# Patient Record
Sex: FEMALE | Race: OTHER | HISPANIC OR LATINO | URBAN - METROPOLITAN AREA
[De-identification: names, ages, dates, MRNs, and addresses within clinical notes are randomized per-mention and may not be internally consistent; named-entity substitution may affect disease eponyms.]

---

## 2017-01-02 ENCOUNTER — EMERGENCY (EMERGENCY)
Facility: HOSPITAL | Age: 52
LOS: 1 days | Discharge: HOME | End: 2017-01-02
Attending: EMERGENCY MEDICINE
Payer: MEDICAID

## 2017-01-02 VITALS
HEART RATE: 63 BPM | OXYGEN SATURATION: 96 % | SYSTOLIC BLOOD PRESSURE: 109 MMHG | TEMPERATURE: 98 F | DIASTOLIC BLOOD PRESSURE: 60 MMHG | RESPIRATION RATE: 20 BRPM

## 2017-01-02 VITALS
HEART RATE: 74 BPM | SYSTOLIC BLOOD PRESSURE: 110 MMHG | TEMPERATURE: 98 F | DIASTOLIC BLOOD PRESSURE: 53 MMHG | OXYGEN SATURATION: 97 % | RESPIRATION RATE: 96 BRPM | HEIGHT: 60 IN | WEIGHT: 130.07 LBS

## 2017-01-02 DIAGNOSIS — R19.7 DIARRHEA, UNSPECIFIED: ICD-10-CM

## 2017-01-02 DIAGNOSIS — R11.10 VOMITING, UNSPECIFIED: ICD-10-CM

## 2017-01-02 DIAGNOSIS — R50.9 FEVER, UNSPECIFIED: ICD-10-CM

## 2017-01-02 LAB
ALBUMIN SERPL ELPH-MCNC: 3.6 G/DL — SIGNIFICANT CHANGE UP (ref 3.5–5)
ALP SERPL-CCNC: 88 U/L — SIGNIFICANT CHANGE UP (ref 40–120)
ALT FLD-CCNC: 26 U/L DA — SIGNIFICANT CHANGE UP (ref 10–60)
ANION GAP SERPL CALC-SCNC: 8 MMOL/L — SIGNIFICANT CHANGE UP (ref 5–17)
APPEARANCE UR: CLEAR — SIGNIFICANT CHANGE UP
AST SERPL-CCNC: 24 U/L — SIGNIFICANT CHANGE UP (ref 10–40)
BASOPHILS # BLD AUTO: 0 K/UL — SIGNIFICANT CHANGE UP (ref 0–0.2)
BASOPHILS NFR BLD AUTO: 0.5 % — SIGNIFICANT CHANGE UP (ref 0–2)
BILIRUB SERPL-MCNC: 0.5 MG/DL — SIGNIFICANT CHANGE UP (ref 0.2–1.2)
BILIRUB UR-MCNC: NEGATIVE — SIGNIFICANT CHANGE UP
BUN SERPL-MCNC: 28 MG/DL — HIGH (ref 7–18)
CALCIUM SERPL-MCNC: 9.1 MG/DL — SIGNIFICANT CHANGE UP (ref 8.4–10.5)
CHLORIDE SERPL-SCNC: 109 MMOL/L — HIGH (ref 96–108)
CO2 SERPL-SCNC: 25 MMOL/L — SIGNIFICANT CHANGE UP (ref 22–31)
COLOR SPEC: YELLOW — SIGNIFICANT CHANGE UP
CREAT SERPL-MCNC: 1.22 MG/DL — SIGNIFICANT CHANGE UP (ref 0.5–1.3)
DIFF PNL FLD: ABNORMAL
EOSINOPHIL # BLD AUTO: 0.2 K/UL — SIGNIFICANT CHANGE UP (ref 0–0.5)
EOSINOPHIL NFR BLD AUTO: 2.1 % — SIGNIFICANT CHANGE UP (ref 0–6)
GLUCOSE SERPL-MCNC: 106 MG/DL — HIGH (ref 70–99)
GLUCOSE UR QL: NEGATIVE — SIGNIFICANT CHANGE UP
HCG UR QL: NEGATIVE — SIGNIFICANT CHANGE UP
HCT VFR BLD CALC: 36 % — SIGNIFICANT CHANGE UP (ref 34.5–45)
HGB BLD-MCNC: 12 G/DL — SIGNIFICANT CHANGE UP (ref 11.5–15.5)
KETONES UR-MCNC: ABNORMAL
LEUKOCYTE ESTERASE UR-ACNC: ABNORMAL
LIDOCAIN IGE QN: 169 U/L — SIGNIFICANT CHANGE UP (ref 73–393)
LYMPHOCYTES # BLD AUTO: 0.3 K/UL — LOW (ref 1–3.3)
LYMPHOCYTES # BLD AUTO: 3.2 % — LOW (ref 13–44)
MCHC RBC-ENTMCNC: 30.6 PG — SIGNIFICANT CHANGE UP (ref 27–34)
MCHC RBC-ENTMCNC: 33.4 GM/DL — SIGNIFICANT CHANGE UP (ref 32–36)
MCV RBC AUTO: 91.6 FL — SIGNIFICANT CHANGE UP (ref 80–100)
MONOCYTES # BLD AUTO: 0.2 K/UL — SIGNIFICANT CHANGE UP (ref 0–0.9)
MONOCYTES NFR BLD AUTO: 2.4 % — SIGNIFICANT CHANGE UP (ref 2–14)
NEUTROPHILS # BLD AUTO: 8.5 K/UL — HIGH (ref 1.8–7.4)
NEUTROPHILS NFR BLD AUTO: 91.8 % — HIGH (ref 43–77)
NITRITE UR-MCNC: NEGATIVE — SIGNIFICANT CHANGE UP
PH UR: 5 — SIGNIFICANT CHANGE UP (ref 4.8–8)
PLATELET # BLD AUTO: 204 K/UL — SIGNIFICANT CHANGE UP (ref 150–400)
POTASSIUM SERPL-MCNC: 4.2 MMOL/L — SIGNIFICANT CHANGE UP (ref 3.5–5.3)
POTASSIUM SERPL-SCNC: 4.2 MMOL/L — SIGNIFICANT CHANGE UP (ref 3.5–5.3)
PROT SERPL-MCNC: 8.2 G/DL — SIGNIFICANT CHANGE UP (ref 6–8.3)
PROT UR-MCNC: 500 MG/DL
RBC # BLD: 3.93 M/UL — SIGNIFICANT CHANGE UP (ref 3.8–5.2)
RBC # FLD: 12.6 % — SIGNIFICANT CHANGE UP (ref 10.3–14.5)
SODIUM SERPL-SCNC: 142 MMOL/L — SIGNIFICANT CHANGE UP (ref 135–145)
SP GR SPEC: 1.02 — SIGNIFICANT CHANGE UP (ref 1.01–1.02)
UROBILINOGEN FLD QL: NEGATIVE — SIGNIFICANT CHANGE UP
WBC # BLD: 9.3 K/UL — SIGNIFICANT CHANGE UP (ref 3.8–10.5)
WBC # FLD AUTO: 9.3 K/UL — SIGNIFICANT CHANGE UP (ref 3.8–10.5)

## 2017-01-02 PROCEDURE — 81025 URINE PREGNANCY TEST: CPT

## 2017-01-02 PROCEDURE — 99284 EMERGENCY DEPT VISIT MOD MDM: CPT

## 2017-01-02 PROCEDURE — 83690 ASSAY OF LIPASE: CPT

## 2017-01-02 PROCEDURE — 99284 EMERGENCY DEPT VISIT MOD MDM: CPT | Mod: 25

## 2017-01-02 PROCEDURE — 96374 THER/PROPH/DIAG INJ IV PUSH: CPT

## 2017-01-02 PROCEDURE — 80053 COMPREHEN METABOLIC PANEL: CPT

## 2017-01-02 PROCEDURE — 85027 COMPLETE CBC AUTOMATED: CPT

## 2017-01-02 PROCEDURE — 81001 URINALYSIS AUTO W/SCOPE: CPT

## 2017-01-02 RX ORDER — ONDANSETRON 8 MG/1
4 TABLET, FILM COATED ORAL ONCE
Qty: 0 | Refills: 0 | Status: COMPLETED | OUTPATIENT
Start: 2017-01-02 | End: 2017-01-02

## 2017-01-02 RX ORDER — ONDANSETRON 8 MG/1
1 TABLET, FILM COATED ORAL
Qty: 15 | Refills: 0
Start: 2017-01-02 | End: 2017-01-07

## 2017-01-02 RX ORDER — SODIUM CHLORIDE 9 MG/ML
1000 INJECTION INTRAMUSCULAR; INTRAVENOUS; SUBCUTANEOUS ONCE
Qty: 0 | Refills: 0 | Status: COMPLETED | OUTPATIENT
Start: 2017-01-02 | End: 2017-01-02

## 2017-01-02 RX ADMIN — SODIUM CHLORIDE 2000 MILLILITER(S): 9 INJECTION INTRAMUSCULAR; INTRAVENOUS; SUBCUTANEOUS at 14:27

## 2017-01-02 RX ADMIN — ONDANSETRON 4 MILLIGRAM(S): 8 TABLET, FILM COATED ORAL at 14:27

## 2017-01-02 NOTE — ED PROVIDER NOTE - OBJECTIVE STATEMENT
50 y/o F pt w/ PMHx of presents to the ED c/o vomiting x4 and diarrhea x3 onset today AM. Pt reports subjective fever. As per family, the Sx are worse w/ PO intake. Pt denies bloody emesis, blood in stool, dysuria, hematuria or any other complaints. NKDA.

## 2017-01-02 NOTE — ED PROVIDER NOTE - MEDICAL DECISION MAKING DETAILS
50 y/o F pt w/ vomiting and diarrhea. Likely viral gastroenteritis. Non tender abd. Treat w/ Zofran and IV fluids.

## 2017-01-02 NOTE — ED ADULT NURSE NOTE - OBJECTIVE STATEMENT
Patient came to the ED a/o x 3 ambulates c/o vomiting and slight abdominal pain since this AM. Patient's abdomen is non distended and soft to touch during physical assessment.

## 2017-01-02 NOTE — ED PROVIDER NOTE - NS ED MD SCRIBE ATTENDING SCRIBE SECTIONS
HIV/VITAL SIGNS( Pullset)/REVIEW OF SYSTEMS/PAST MEDICAL/SURGICAL/SOCIAL HISTORY/HISTORY OF PRESENT ILLNESS/DISPOSITION/PHYSICAL EXAM

## 2017-01-02 NOTE — ED PROVIDER NOTE - CONDUCTED A DETAILED DISCUSSION WITH PATIENT AND/OR GUARDIAN REGARDING, MDM
need for outpatient follow-up/lab results/return to ED if symptoms worsen, persist or questions arise

## 2019-04-11 ENCOUNTER — APPOINTMENT (OUTPATIENT)
Dept: NEPHROLOGY | Facility: CLINIC | Age: 54
End: 2019-04-11
Payer: COMMERCIAL

## 2019-04-11 ENCOUNTER — LABORATORY RESULT (OUTPATIENT)
Age: 54
End: 2019-04-11

## 2019-04-11 VITALS
DIASTOLIC BLOOD PRESSURE: 75 MMHG | HEIGHT: 58 IN | WEIGHT: 132.28 LBS | OXYGEN SATURATION: 97 % | BODY MASS INDEX: 27.77 KG/M2 | SYSTOLIC BLOOD PRESSURE: 134 MMHG | HEART RATE: 51 BPM

## 2019-04-11 DIAGNOSIS — Z78.9 OTHER SPECIFIED HEALTH STATUS: ICD-10-CM

## 2019-04-11 DIAGNOSIS — Z86.39 PERSONAL HISTORY OF OTHER ENDOCRINE, NUTRITIONAL AND METABOLIC DISEASE: ICD-10-CM

## 2019-04-11 PROCEDURE — 99204 OFFICE O/P NEW MOD 45 MIN: CPT

## 2019-04-11 NOTE — ASSESSMENT
[FreeTextEntry1] : Ms. 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 SHe has some rise in crt and worsening proteinuria and HTN. She has no IF done on this kidney bx last month. 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 Explained to pt and daughter and  that we need to repeat the kidney bx. They shall think about it and let me know by next 1-2 days.\par \par Meanwhile, cont conservative management\par Will check serologies for c3,c4, REBECCA, dsDNA, ANCAs, Anti GBM, RF, free light chains, PLA2R and urine p/crt ratio.\par Check coags incase needs a kidney biopsy again\par \par f.u after kidney bx\par \par

## 2019-04-11 NOTE — PHYSICAL EXAM
[General Appearance - Alert] : alert [General Appearance - In No Acute Distress] : in no acute distress [Sclera] : the sclera and conjunctiva were normal [PERRL With Normal Accommodation] : pupils were equal in size, round, and reactive to light [Extraocular Movements] : extraocular movements were intact [Outer Ear] : the ears and nose were normal in appearance [Oropharynx] : the oropharynx was normal [Auscultation Breath Sounds / Voice Sounds] : lungs were clear to auscultation bilaterally [Heart Rate And Rhythm] : heart rate was normal and rhythm regular [Heart Sounds] : normal S1 and S2 [Heart Sounds Gallop] : no gallops [Murmurs] : no murmurs [Heart Sounds Pericardial Friction Rub] : no pericardial rub [Edema] : there was no peripheral edema [Bowel Sounds] : normal bowel sounds [Abdomen Soft] : soft [Abdomen Tenderness] : non-tender [] : no hepato-splenomegaly [Abdomen Mass (___ Cm)] : no abdominal mass palpated [Skin Color & Pigmentation] : normal skin color and pigmentation [Cranial Nerves] : cranial nerves 2-12 were intact [Oriented To Time, Place, And Person] : oriented to person, place, and time

## 2019-04-11 NOTE — HISTORY OF PRESENT ILLNESS
[FreeTextEntry1] : Ms. 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 She was in usual state of health with just hx of hyperlipidemia saw Dr Cruz Godoy a year ago for mild non nephrotic range proteinuria and normal crt. She was started on a ACEI and monitored. She had serological workup and sonogram that were not reaveling. She then progressed and her proteinuria went from 700 to 1500mg/ 24 hours. She then had a kidney bx done last month that was non conclusive.  It was limited due to number of glomeruli but had ? immune complex deposition. EF showed 50% foot proces effacement and no tissue for IF. She is here for a second opinion. \par No n/v. No decrease in appetite, no termors. No edema worsening. no decreased sleep. No foamy urine. no hematuria, no dysuria. no confusion. No SOB, WHITE. No wt loss.\par

## 2019-04-11 NOTE — REASON FOR VISIT
[Initial Evaluation] : an initial evaluation [Spouse] : spouse [Family Member] : family member [FreeTextEntry1] : for proteinuria

## 2019-04-12 LAB
ALBUMIN SERPL ELPH-MCNC: 4.4 G/DL
ANION GAP SERPL CALC-SCNC: 13 MMOL/L
APPEARANCE: CLEAR
APTT BLD: 35.5 SEC
BACTERIA: NEGATIVE
BASOPHILS # BLD AUTO: 0.04 K/UL
BASOPHILS NFR BLD AUTO: 0.8 %
BILIRUBIN URINE: NEGATIVE
BLOOD URINE: NORMAL
BUN SERPL-MCNC: 35 MG/DL
C3 SERPL-MCNC: 105 MG/DL
C4 SERPL-MCNC: 26 MG/DL
CALCIUM SERPL-MCNC: 9.4 MG/DL
CHLORIDE SERPL-SCNC: 104 MMOL/L
CHOLEST SERPL-MCNC: 145 MG/DL
CHOLEST/HDLC SERPL: 1.8 RATIO
CO2 SERPL-SCNC: 24 MMOL/L
COLOR: COLORLESS
CREAT SERPL-MCNC: 1.15 MG/DL
CREAT SPEC-SCNC: 18 MG/DL
CREAT/PROT UR: 0.9 RATIO
DEPRECATED KAPPA LC FREE/LAMBDA SER: 1.28 RATIO
ENA RNP AB SER IA-ACNC: <0.2 AL
ENA SM AB SER IA-ACNC: <0.2 AL
EOSINOPHIL # BLD AUTO: 0.37 K/UL
EOSINOPHIL NFR BLD AUTO: 7.1 %
ESTIMATED AVERAGE GLUCOSE: 114 MG/DL
GLUCOSE QUALITATIVE U: NEGATIVE
GLUCOSE SERPL-MCNC: 101 MG/DL
HBA1C MFR BLD HPLC: 5.6 %
HBV CORE IGG+IGM SER QL: NONREACTIVE
HBV CORE IGM SER QL: NONREACTIVE
HBV SURFACE AB SER QL: NONREACTIVE
HBV SURFACE AB SERPL IA-ACNC: <3 MIU/ML
HBV SURFACE AG SER QL: NONREACTIVE
HCT VFR BLD CALC: 35.8 %
HCV AB SER QL: NONREACTIVE
HCV S/CO RATIO: 0.16 S/CO
HDLC SERPL-MCNC: 79 MG/DL
HGB BLD-MCNC: 11.6 G/DL
HIV1+2 AB SPEC QL IA.RAPID: NONREACTIVE
HYALINE CASTS: 0 /LPF
IMM GRANULOCYTES NFR BLD AUTO: 0.2 %
INR PPP: 1.03 RATIO
KAPPA LC CSF-MCNC: 3.34 MG/DL
KAPPA LC SERPL-MCNC: 4.26 MG/DL
KETONES URINE: NEGATIVE
LDLC SERPL CALC-MCNC: 49 MG/DL
LEUKOCYTE ESTERASE URINE: NEGATIVE
LYMPHOCYTES # BLD AUTO: 1.72 K/UL
LYMPHOCYTES NFR BLD AUTO: 33.1 %
MAN DIFF?: NORMAL
MCHC RBC-ENTMCNC: 31.1 PG
MCHC RBC-ENTMCNC: 32.4 GM/DL
MCV RBC AUTO: 96 FL
MICROSCOPIC-UA: NORMAL
MONOCYTES # BLD AUTO: 0.44 K/UL
MONOCYTES NFR BLD AUTO: 8.5 %
MPO AB + PR3 PNL SER: NORMAL
NEUTROPHILS # BLD AUTO: 2.61 K/UL
NEUTROPHILS NFR BLD AUTO: 50.3 %
NITRITE URINE: NEGATIVE
PH URINE: 6
PHOSPHATE SERPL-MCNC: 4 MG/DL
PLATELET # BLD AUTO: 216 K/UL
POTASSIUM SERPL-SCNC: 4.9 MMOL/L
PROT UR-MCNC: 17 MG/DL
PROTEIN URINE: NORMAL
PT BLD: 11.7 SEC
RBC # BLD: 3.73 M/UL
RBC # FLD: 12.5 %
RED BLOOD CELLS URINE: 1 /HPF
RHEUMATOID FACT SER QL: <10 IU/ML
SODIUM SERPL-SCNC: 141 MMOL/L
SPECIFIC GRAVITY URINE: 1.01
SQUAMOUS EPITHELIAL CELLS: 1 /HPF
T PALLIDUM AB SER QL IA: NEGATIVE
TRIGL SERPL-MCNC: 87 MG/DL
UROBILINOGEN URINE: NORMAL
WBC # FLD AUTO: 5.19 K/UL
WHITE BLOOD CELLS URINE: 0 /HPF

## 2019-04-15 LAB
DSDNA AB SER-ACNC: <12 IU/ML
M PROTEIN SPEC IFE-MCNC: NORMAL

## 2019-04-16 LAB
ANA PAT FLD IF-IMP: ABNORMAL
ANA SER IF-ACNC: ABNORMAL
PHOSPHOLIPASE A2 RECEPTOR ELISA: 3 RU/ML
PHOSPHOLIPASE A2 RECEPTOR IFA: NEGATIVE

## 2019-05-01 ENCOUNTER — OUTPATIENT (OUTPATIENT)
Dept: OUTPATIENT SERVICES | Facility: HOSPITAL | Age: 54
LOS: 1 days | End: 2019-05-01
Payer: MEDICAID

## 2019-05-07 ENCOUNTER — FORM ENCOUNTER (OUTPATIENT)
Age: 54
End: 2019-05-07

## 2019-05-08 ENCOUNTER — RESULT REVIEW (OUTPATIENT)
Age: 54
End: 2019-05-08

## 2019-05-08 ENCOUNTER — APPOINTMENT (OUTPATIENT)
Dept: ULTRASOUND IMAGING | Facility: HOSPITAL | Age: 54
End: 2019-05-08
Payer: COMMERCIAL

## 2019-05-08 ENCOUNTER — OUTPATIENT (OUTPATIENT)
Dept: OUTPATIENT SERVICES | Facility: HOSPITAL | Age: 54
LOS: 1 days | End: 2019-05-08
Payer: MEDICAID

## 2019-05-08 DIAGNOSIS — Z00.00 ENCOUNTER FOR GENERAL ADULT MEDICAL EXAMINATION WITHOUT ABNORMAL FINDINGS: ICD-10-CM

## 2019-05-08 PROCEDURE — 50200 RENAL BIOPSY PERQ: CPT | Mod: LT

## 2019-05-08 PROCEDURE — 88305 TISSUE EXAM BY PATHOLOGIST: CPT | Mod: 26

## 2019-05-08 PROCEDURE — 88350 IMFLUOR EA ADDL 1ANTB STN PX: CPT | Mod: 26

## 2019-05-08 PROCEDURE — 88313 SPECIAL STAINS GROUP 2: CPT

## 2019-05-08 PROCEDURE — 88346 IMFLUOR 1ST 1ANTB STAIN PX: CPT

## 2019-05-08 PROCEDURE — 88346 IMFLUOR 1ST 1ANTB STAIN PX: CPT | Mod: 26

## 2019-05-08 PROCEDURE — 88312 SPECIAL STAINS GROUP 1: CPT | Mod: 26

## 2019-05-08 PROCEDURE — 88350 IMFLUOR EA ADDL 1ANTB STN PX: CPT

## 2019-05-08 PROCEDURE — 88313 SPECIAL STAINS GROUP 2: CPT | Mod: 26

## 2019-05-08 PROCEDURE — 88348 ELECTRON MICROSCOPY DX: CPT | Mod: 26

## 2019-05-08 PROCEDURE — 88348 ELECTRON MICROSCOPY DX: CPT

## 2019-05-08 PROCEDURE — 88305 TISSUE EXAM BY PATHOLOGIST: CPT

## 2019-05-08 PROCEDURE — 76942 ECHO GUIDE FOR BIOPSY: CPT | Mod: 26

## 2019-05-08 PROCEDURE — 50200 RENAL BIOPSY PERQ: CPT

## 2019-05-08 PROCEDURE — 76942 ECHO GUIDE FOR BIOPSY: CPT

## 2019-05-08 PROCEDURE — 88312 SPECIAL STAINS GROUP 1: CPT

## 2019-05-09 ENCOUNTER — INPATIENT (INPATIENT)
Facility: HOSPITAL | Age: 54
LOS: 3 days | Discharge: HOME | DRG: 699 | End: 2019-05-13
Attending: HOSPITALIST | Admitting: HOSPITALIST
Payer: MEDICAID

## 2019-05-09 VITALS
DIASTOLIC BLOOD PRESSURE: 84 MMHG | HEIGHT: 61 IN | WEIGHT: 139.99 LBS | TEMPERATURE: 98 F | RESPIRATION RATE: 16 BRPM | OXYGEN SATURATION: 98 % | SYSTOLIC BLOOD PRESSURE: 134 MMHG | HEART RATE: 47 BPM

## 2019-05-09 DIAGNOSIS — S37.019A MINOR CONTUSION OF UNSPECIFIED KIDNEY, INITIAL ENCOUNTER: ICD-10-CM

## 2019-05-09 LAB
ALBUMIN SERPL ELPH-MCNC: 4 G/DL — SIGNIFICANT CHANGE UP (ref 3.3–5)
ALP SERPL-CCNC: 132 U/L — HIGH (ref 40–120)
ALT FLD-CCNC: 31 U/L — SIGNIFICANT CHANGE UP (ref 10–45)
ANION GAP SERPL CALC-SCNC: 17 MMOL/L — SIGNIFICANT CHANGE UP (ref 5–17)
APPEARANCE UR: CLEAR — SIGNIFICANT CHANGE UP
APTT BLD: 27.8 SEC — SIGNIFICANT CHANGE UP (ref 27.5–36.3)
AST SERPL-CCNC: 26 U/L — SIGNIFICANT CHANGE UP (ref 10–40)
BACTERIA # UR AUTO: NEGATIVE — SIGNIFICANT CHANGE UP
BASOPHILS # BLD AUTO: 0.1 K/UL — SIGNIFICANT CHANGE UP (ref 0–0.2)
BASOPHILS NFR BLD AUTO: 0.5 % — SIGNIFICANT CHANGE UP (ref 0–2)
BILIRUB SERPL-MCNC: 0.2 MG/DL — SIGNIFICANT CHANGE UP (ref 0.2–1.2)
BILIRUB UR-MCNC: NEGATIVE — SIGNIFICANT CHANGE UP
BLD GP AB SCN SERPL QL: NEGATIVE — SIGNIFICANT CHANGE UP
BUN SERPL-MCNC: 38 MG/DL — HIGH (ref 7–23)
CALCIUM SERPL-MCNC: 9.2 MG/DL — SIGNIFICANT CHANGE UP (ref 8.4–10.5)
CHLORIDE SERPL-SCNC: 101 MMOL/L — SIGNIFICANT CHANGE UP (ref 96–108)
CO2 SERPL-SCNC: 23 MMOL/L — SIGNIFICANT CHANGE UP (ref 22–31)
COLOR SPEC: SIGNIFICANT CHANGE UP
CREAT SERPL-MCNC: 1.37 MG/DL — HIGH (ref 0.5–1.3)
DIFF PNL FLD: ABNORMAL
EOSINOPHIL # BLD AUTO: 0.3 K/UL — SIGNIFICANT CHANGE UP (ref 0–0.5)
EOSINOPHIL NFR BLD AUTO: 1.6 % — SIGNIFICANT CHANGE UP (ref 0–6)
EPI CELLS # UR: 1 /HPF — SIGNIFICANT CHANGE UP
GAS PNL BLDV: SIGNIFICANT CHANGE UP
GAS PNL BLDV: SIGNIFICANT CHANGE UP
GLUCOSE SERPL-MCNC: 193 MG/DL — HIGH (ref 70–99)
GLUCOSE UR QL: NEGATIVE — SIGNIFICANT CHANGE UP
HCG SERPL-ACNC: 6.4 MIU/ML — SIGNIFICANT CHANGE UP
HCT VFR BLD CALC: 30.2 % — LOW (ref 34.5–45)
HCT VFR BLD CALC: 35.5 % — SIGNIFICANT CHANGE UP (ref 34.5–45)
HGB BLD-MCNC: 10.5 G/DL — LOW (ref 11.5–15.5)
HGB BLD-MCNC: 12.1 G/DL — SIGNIFICANT CHANGE UP (ref 11.5–15.5)
HYALINE CASTS # UR AUTO: 2 /LPF — SIGNIFICANT CHANGE UP (ref 0–2)
INR BLD: 1.11 RATIO — SIGNIFICANT CHANGE UP (ref 0.88–1.16)
KETONES UR-MCNC: NEGATIVE — SIGNIFICANT CHANGE UP
LEUKOCYTE ESTERASE UR-ACNC: NEGATIVE — SIGNIFICANT CHANGE UP
LIDOCAIN IGE QN: 45 U/L — SIGNIFICANT CHANGE UP (ref 7–60)
LYMPHOCYTES # BLD AUTO: 14.1 % — SIGNIFICANT CHANGE UP (ref 13–44)
LYMPHOCYTES # BLD AUTO: 2.2 K/UL — SIGNIFICANT CHANGE UP (ref 1–3.3)
MCHC RBC-ENTMCNC: 32.7 PG — SIGNIFICANT CHANGE UP (ref 27–34)
MCHC RBC-ENTMCNC: 33.3 PG — SIGNIFICANT CHANGE UP (ref 27–34)
MCHC RBC-ENTMCNC: 34 GM/DL — SIGNIFICANT CHANGE UP (ref 32–36)
MCHC RBC-ENTMCNC: 34.9 GM/DL — SIGNIFICANT CHANGE UP (ref 32–36)
MCV RBC AUTO: 95.4 FL — SIGNIFICANT CHANGE UP (ref 80–100)
MCV RBC AUTO: 96.2 FL — SIGNIFICANT CHANGE UP (ref 80–100)
MONOCYTES # BLD AUTO: 0.8 K/UL — SIGNIFICANT CHANGE UP (ref 0–0.9)
MONOCYTES NFR BLD AUTO: 5 % — SIGNIFICANT CHANGE UP (ref 2–14)
NEUTROPHILS # BLD AUTO: 12.6 K/UL — HIGH (ref 1.8–7.4)
NEUTROPHILS NFR BLD AUTO: 78.8 % — HIGH (ref 43–77)
NITRITE UR-MCNC: NEGATIVE — SIGNIFICANT CHANGE UP
PH UR: 6 — SIGNIFICANT CHANGE UP (ref 5–8)
PLATELET # BLD AUTO: 181 K/UL — SIGNIFICANT CHANGE UP (ref 150–400)
PLATELET # BLD AUTO: 229 K/UL — SIGNIFICANT CHANGE UP (ref 150–400)
POTASSIUM SERPL-MCNC: 4.4 MMOL/L — SIGNIFICANT CHANGE UP (ref 3.5–5.3)
POTASSIUM SERPL-SCNC: 4.4 MMOL/L — SIGNIFICANT CHANGE UP (ref 3.5–5.3)
PROT SERPL-MCNC: 7.7 G/DL — SIGNIFICANT CHANGE UP (ref 6–8.3)
PROT UR-MCNC: ABNORMAL
PROTHROM AB SERPL-ACNC: 12.7 SEC — SIGNIFICANT CHANGE UP (ref 10–12.9)
RBC # BLD: 3.17 M/UL — LOW (ref 3.8–5.2)
RBC # BLD: 3.69 M/UL — LOW (ref 3.8–5.2)
RBC # FLD: 11.2 % — SIGNIFICANT CHANGE UP (ref 10.3–14.5)
RBC # FLD: 11.2 % — SIGNIFICANT CHANGE UP (ref 10.3–14.5)
RBC CASTS # UR COMP ASSIST: 6 /HPF — HIGH (ref 0–4)
RH IG SCN BLD-IMP: POSITIVE — SIGNIFICANT CHANGE UP
SODIUM SERPL-SCNC: 141 MMOL/L — SIGNIFICANT CHANGE UP (ref 135–145)
SP GR SPEC: 1.02 — SIGNIFICANT CHANGE UP (ref 1.01–1.02)
UROBILINOGEN FLD QL: NEGATIVE — SIGNIFICANT CHANGE UP
WBC # BLD: 15.9 K/UL — HIGH (ref 3.8–10.5)
WBC # BLD: 17.2 K/UL — HIGH (ref 3.8–10.5)
WBC # FLD AUTO: 15.9 K/UL — HIGH (ref 3.8–10.5)
WBC # FLD AUTO: 17.2 K/UL — HIGH (ref 3.8–10.5)
WBC UR QL: 1 /HPF — SIGNIFICANT CHANGE UP (ref 0–5)

## 2019-05-09 PROCEDURE — 99231 SBSQ HOSP IP/OBS SF/LOW 25: CPT

## 2019-05-09 PROCEDURE — 99285 EMERGENCY DEPT VISIT HI MDM: CPT | Mod: 25

## 2019-05-09 PROCEDURE — 74177 CT ABD & PELVIS W/CONTRAST: CPT | Mod: 26

## 2019-05-09 PROCEDURE — 93010 ELECTROCARDIOGRAM REPORT: CPT

## 2019-05-09 RX ORDER — MORPHINE SULFATE 50 MG/1
4 CAPSULE, EXTENDED RELEASE ORAL ONCE
Refills: 0 | Status: DISCONTINUED | OUTPATIENT
Start: 2019-05-09 | End: 2019-05-09

## 2019-05-09 RX ORDER — ONDANSETRON 8 MG/1
4 TABLET, FILM COATED ORAL ONCE
Refills: 0 | Status: COMPLETED | OUTPATIENT
Start: 2019-05-09 | End: 2019-05-09

## 2019-05-09 RX ORDER — SODIUM CHLORIDE 9 MG/ML
1000 INJECTION, SOLUTION INTRAVENOUS ONCE
Refills: 0 | Status: COMPLETED | OUTPATIENT
Start: 2019-05-09 | End: 2019-05-09

## 2019-05-09 RX ADMIN — ONDANSETRON 4 MILLIGRAM(S): 8 TABLET, FILM COATED ORAL at 14:47

## 2019-05-09 RX ADMIN — MORPHINE SULFATE 4 MILLIGRAM(S): 50 CAPSULE, EXTENDED RELEASE ORAL at 20:16

## 2019-05-09 RX ADMIN — MORPHINE SULFATE 4 MILLIGRAM(S): 50 CAPSULE, EXTENDED RELEASE ORAL at 14:47

## 2019-05-09 RX ADMIN — MORPHINE SULFATE 4 MILLIGRAM(S): 50 CAPSULE, EXTENDED RELEASE ORAL at 16:00

## 2019-05-09 RX ADMIN — SODIUM CHLORIDE 1000 MILLILITER(S): 9 INJECTION, SOLUTION INTRAVENOUS at 18:30

## 2019-05-09 RX ADMIN — MORPHINE SULFATE 4 MILLIGRAM(S): 50 CAPSULE, EXTENDED RELEASE ORAL at 16:06

## 2019-05-09 NOTE — ED PROVIDER NOTE - CARE PLAN
Principal Discharge DX:	Perinephric hematoma Principal Discharge DX:	Perinephric hematoma  Secondary Diagnosis:	Bradycardia

## 2019-05-09 NOTE — CONSULT NOTE ADULT - SUBJECTIVE AND OBJECTIVE BOX
HPI    53F p/w L sided flank pain for 1 day. She is POD #1 from a left renal biopsy for evaluation of proteinuria and microscopic hematuria done by IR Dr. Carmona. Pt scores pain 8/10, associated w/ 2x episodes of vomiting, chills,  but no fever. Took Tylenol for pain but vomited shortly thereafter. Denies hematuria/dysuria, suprapubic pain.     PAST MEDICAL & SURGICAL HISTORY:  Proteinuria  Hypertension  No significant past surgical history      MEDICATIONS  (STANDING):    Allergies    No Known Allergies    SOCIAL HISTORY: denies smoking, alcohol    REVIEW OF SYSTEMS: Otherwise negative as stated in HPI    Physical Exam    Gen: awake, alert and oriented x 4, NAD. Skin normal, no rash.   Abd: soft, mild tender on palpation over LLQ  : bladder non palpable, no suprapubic pain  BACK: left site - dressing dry, clean, intact. No ecchymosis, no erythema, no edema. Mild tender on palpation over incision site    Vital signs  T(C): 37.1 (19 @ 20:10), Max: 37.1 (19 @ 20:10)  HR: 54 (19 @ 20:10)  BP: 154/84 (19 @ 20:10)  SpO2: 97% (19 @ 20:10)  Wt(kg): --      LABS:       @ 20:26    WBC 17.2  / Hct 30.2  / SCr --        @ 14:55    WBC 15.9  / Hct 35.5  / SCr 1.37         141  |  101  |  38<H>  ----------------------------<  193<H>  4.4   |  23  |  1.37<H>    Ca    9.2      09 May 2019 14:55    TPro  7.7  /  Alb  4.0  /  TBili  0.2  /  DBili  x   /  AST  26  /  ALT  31  /  AlkPhos  132<H>        Urinalysis Basic - ( 09 May 2019 17:32 )    Color: Light Yellow / Appearance: Clear / S.019 / pH: x  Gluc: x / Ketone: Negative  / Bili: Negative / Urobili: Negative   Blood: x / Protein: 100 mg/dL / Nitrite: Negative   Leuk Esterase: Negative / RBC: 6 /hpf / WBC 1 /HPF   Sq Epi: x / Non Sq Epi: 1 /hpf / Bacteria: Negative      RADIOLOGY:  < from: CT Abdomen and Pelvis w/ IV Cont (19 @ 18:05) >  PROCEDURE:   CT of the Abdomen and Pelvis was performed with intravenous contrast.   Intravenous contrast: 90 ml Omnipaque 350. 10 ml discarded.  Oral contrast: None.  Sagittal and coronal reformats were performed.    FINDINGS:    LOWER CHEST: Trace left pleural effusion.    LIVER: Within normal limits.  BILE DUCTS: Mild intrahepatic biliary ductal dilatation. The common bile   duct measures up to 1.7 cm. No radiodense choledocholithiasis.  GALLBLADDER: Cholecystectomy.  SPLEEN: Within normal limits.  PANCREAS: Within normal limits.  ADRENALS: Within normal limits.  KIDNEYS/URETERS: Small left renal lower pole defect likely represents the   renal biopsy site. There is a moderate left perirenal hematoma. Slightly   delayed nephrogram of the left kidney. No hydronephrosis. No active   extravasation of contrast. Left renal artery and vein are unremarkable.   Right kidney is within normal limits.    BLADDER: Within normal limits.  REPRODUCTIVE ORGANS: Uterus and adnexa within normal limits.    BOWEL: No bowel obstruction. Appendix is normal.  PERITONEUM: Trace perihepatic hematoma. Retroperitoneal blood tracks into   the upper abdomen and the pelvis.  VESSELS:  Within normal limits.  RETROPERITONEUM: Nolymphadenopathy.    ABDOMINAL WALL: Within normal limits.  BONES: Minimal degenerative changes.    IMPRESSION:     Moderate left perirenal hematoma with delayed nephrogram. No active   bleeding identified.    Marked CBD dilatation status post cholecystectomy; suggest nonemergent GI   consultation.    < end of copied text >      < from: US Biopsy Renal, Left (19 @ 14:41) >  INTERPRETATION:  CLINICAL INFORMATION: Kidney dysfunction. The patient is   here for kidney biopsy.    Following an explanation of risks, benefits, and alternatives the patient   gave informed consent. The patient was prepped and draped in the usual   sterile fashion and 2% buffered Lidocaine solution was used for local   anesthesia.     Under ultrasound guidance an automated 18-gauge core biopsy needle was   inserted 3 times into the left renal parenchyma. Adequate core samples   were obtained. There were no complications.    IMPRESSION:    Ultrasound guided left kidney biopsy.      < end of copied text > HPI:  53F p/w L sided flank pain for 1 day. She is POD #1 from a left renal biopsy for evaluation of proteinuria and microscopic hematuria done by IR Dr. Carmona. Pt scores pain 8/10, associated w/ 2x episodes of vomiting, chills,  but no fever. Took Tylenol for pain but vomited shortly thereafter. Denies hematuria/dysuria, suprapubic pain.     PAST MEDICAL & SURGICAL HISTORY:  Proteinuria  Hypertension  No significant past surgical history    No Known Allergies    SOCIAL HISTORY: denies smoking, alcohol    REVIEW OF SYSTEMS: Otherwise negative as stated in HPI    Physical Exam  Gen: awake, alert and oriented x 4, NAD. Skin normal, no rash.   Abd: soft, mild tender on palpation over LLQ  : bladder non palpable, no suprapubic pain  BACK: left site - dressing dry, clean, intact. No ecchymosis, no erythema, no edema. Mild tender on palpation over incision site    Vital signs  T(C): 37.1 (19 @ 20:10), Max: 37.1 (19 @ 20:10)  HR: 54 (19 @ 20:10)  BP: 154/84 (19 @ 20:10)  SpO2: 97% (19 @ 20:10)    LABS:   @ 20:26  WBC 17.2  / Hct 30.2  / SCr --        @ 14:55  WBC 15.9  / Hct 35.5  / SCr 1.37       141  |  101  |  38<H>  ----------------------------<  193<H>  4.4   |  23  |  1.37<H>    Ca    9.2      09 May 2019 14:55    TPro  7.7  /  Alb  4.0  /  TBili  0.2  /  DBili  x   /  AST  26  /  ALT  31  /  AlkPhos  132<H>      Urinalysis Basic - ( 09 May 2019 17:32 )  Color: Light Yellow / Appearance: Clear / S.019 / pH: x  Gluc: x / Ketone: Negative  / Bili: Negative / Urobili: Negative   Blood: x / Protein: 100 mg/dL / Nitrite: Negative   Leuk Esterase: Negative / RBC: 6 /hpf / WBC 1 /HPF   Sq Epi: x / Non Sq Epi: 1 /hpf / Bacteria: Negative      RADIOLOGY:  < from: CT Abdomen and Pelvis w/ IV Cont (19 @ 18:05) >  PROCEDURE:     LOWER CHEST: Trace left pleural effusion.  LIVER: Within normal limits.  BILE DUCTS: Mild intrahepatic biliary ductal dilatation. The common bile   duct measures up to 1.7 cm. No radiodense choledocholithiasis.  GALLBLADDER: Cholecystectomy.  SPLEEN: Within normal limits.  PANCREAS: Within normal limits.  ADRENALS: Within normal limits.  KIDNEYS/URETERS: Small left renal lower pole defect likely represents the   renal biopsy site. There is a moderate left perirenal hematoma. Slightly   delayed nephrogram of the left kidney. No hydronephrosis. No active   extravasation of contrast. Left renal artery and vein are unremarkable.   Right kidney is within normal limits.  BLADDER: Within normal limits.  REPRODUCTIVE ORGANS: Uterus and adnexa within normal limits.  BOWEL: No bowel obstruction. Appendix is normal.  PERITONEUM: Trace perihepatic hematoma. Retroperitoneal blood tracks into   the upper abdomen and the pelvis.  VESSELS:  Within normal limits.  RETROPERITONEUM: Nolymphadenopathy.    ABDOMINAL WALL: Within normal limits.  BONES: Minimal degenerative changes.    IMPRESSION:   Moderate left perirenal hematoma with delayed nephrogram. No active   bleeding identified.  Marked CBD dilatation status post cholecystectomy; suggest nonemergent GI   consultation.      IR Bx:  Following an explanation of risks, benefits, and alternatives the patient   gave informed consent. The patient was prepped and draped in the usual   sterile fashion and 2% buffered Lidocaine solution was used for local   anesthesia.     Under ultrasound guidance an automated 18-gauge core biopsy needle was   inserted 3 times into the left renal parenchyma. Adequate core samples   were obtained. There were no complications.    IMPRESSION:    Ultrasound guided left kidney biopsy.

## 2019-05-09 NOTE — ED PROVIDER NOTE - OBJECTIVE STATEMENT
53F p/w L sided flank pain for 1 day. She is POD #1 from a left renal biopsy for evaluation of proteinuria and microscopic hematuria. Around 0300 she developed the pain, associated w/ 2x episodes of vomiting, chills,  but no fever. Took tylenol for pain but vomited shortly thereafter. No hematuria/dysuria.    IR: Dr. Roberth Carmona  Urologist: Dr. Cruz Godoy  PCP: Dr. Correa

## 2019-05-09 NOTE — ED PROVIDER NOTE - PROGRESS NOTE DETAILS
Endorsed to Dr Mario Henderson MD, Facep Jonathan Weil, PGY2 - perinephric/RP bleed on CT. Will consult urology, will need admission. pt signed out to me pending imaging, was found to have perinephric hematoma. pt seen by urology PA, who recommends medicine admission for serial cbc, pain control, monitoring. - Feroz Bach MD

## 2019-05-09 NOTE — ED ADULT NURSE NOTE - OBJECTIVE STATEMENT
53YOF pmh HTN, HLD presents to ED c/o left flank pain, b/l abd pain, N/V since 11am this morning. Pt speaks Palestinian, refused  services. Daughter prefers to translate. Per daughter at bedside, pt was found to have protein in her urine. Got biopsy fist time but not enough specimen, so received second biopsy yesterday. Pt stated having episode of nausea and vomitting this morning. Denies CP, SOB. fever, chills, diarrhea, blood in urine or stool, burning upon urination. Safety and comfort measures provided. Will continue, Family at bedside.

## 2019-05-09 NOTE — ED PROVIDER NOTE - ATTENDING CONTRIBUTION TO CARE
53y female pmh ?proteinuria, HTN, sp ir renal bx left yesterday. Now comes to ed complains of left flank pain mod severe since procedure with several episodes of NBNB vomiting, without fever chills.cp.sob. PE WDWN female awake alert looking acutely uncomfortable. NCAT Neck supple chest clear anterior & posterior, Left cva mild ttp. +bs, no murphys,psoas,slr, CV no rmg, neruo no focal defects  Roberth Henderson MD, Facep

## 2019-05-09 NOTE — CONSULT NOTE ADULT - ASSESSMENT
53F p/w L sided flank pain for 1 day, POD #1 from a left renal biopsy for evaluation of proteinuria and microscopic hematuria with CT finding of left moderate perirenal hematoma with no active extravasation       - pain control  - antiemetic as needed  - monitor H&H  - no acute urological surgical intervention recommended at this time  - if patient will become unstable please consider IR consult for possible embolization procedure 53F p/w L sided flank pain for 1 day, POD #1 from a left renal biopsy for evaluation of proteinuria and microscopic hematuria with CT finding of left moderate perirenal hematoma with no active extravasation       - pain control  - antiemetic as needed  - monitor H&H  - no acute urological surgical intervention recommended at this time  - If patient becomes unstable please consider IR consult for possible selective angioembolization

## 2019-05-09 NOTE — ED ADULT NURSE REASSESSMENT NOTE - NS ED NURSE REASSESS COMMENT FT1
Patient sleeping in bed at this time. Currently 7 family members at bedside, family was notified that there is only to be 2 family members at the bed side at a time. Security notified at this time.

## 2019-05-09 NOTE — ED PROVIDER NOTE - CONSTITUTIONAL, MLM
normal... Appears uncomfortable d/t pain, otherwise non-toxic, well nourished, awake, alert, oriented to person, place, time/situation

## 2019-05-10 DIAGNOSIS — K83.8 OTHER SPECIFIED DISEASES OF BILIARY TRACT: ICD-10-CM

## 2019-05-10 DIAGNOSIS — T14.8XXA OTHER INJURY OF UNSPECIFIED BODY REGION, INITIAL ENCOUNTER: ICD-10-CM

## 2019-05-10 DIAGNOSIS — Z29.9 ENCOUNTER FOR PROPHYLACTIC MEASURES, UNSPECIFIED: ICD-10-CM

## 2019-05-10 DIAGNOSIS — I10 ESSENTIAL (PRIMARY) HYPERTENSION: ICD-10-CM

## 2019-05-10 DIAGNOSIS — Z90.49 ACQUIRED ABSENCE OF OTHER SPECIFIED PARTS OF DIGESTIVE TRACT: Chronic | ICD-10-CM

## 2019-05-10 DIAGNOSIS — N18.3 CHRONIC KIDNEY DISEASE, STAGE 3 (MODERATE): ICD-10-CM

## 2019-05-10 DIAGNOSIS — N28.89 OTHER SPECIFIED DISORDERS OF KIDNEY AND URETER: ICD-10-CM

## 2019-05-10 DIAGNOSIS — R00.1 BRADYCARDIA, UNSPECIFIED: ICD-10-CM

## 2019-05-10 DIAGNOSIS — D72.829 ELEVATED WHITE BLOOD CELL COUNT, UNSPECIFIED: ICD-10-CM

## 2019-05-10 DIAGNOSIS — E78.5 HYPERLIPIDEMIA, UNSPECIFIED: ICD-10-CM

## 2019-05-10 DIAGNOSIS — Z71.89 OTHER SPECIFIED COUNSELING: ICD-10-CM

## 2019-05-10 DIAGNOSIS — S37.019A MINOR CONTUSION OF UNSPECIFIED KIDNEY, INITIAL ENCOUNTER: ICD-10-CM

## 2019-05-10 DIAGNOSIS — N17.9 ACUTE KIDNEY FAILURE, UNSPECIFIED: ICD-10-CM

## 2019-05-10 DIAGNOSIS — D50.0 IRON DEFICIENCY ANEMIA SECONDARY TO BLOOD LOSS (CHRONIC): ICD-10-CM

## 2019-05-10 LAB
ALBUMIN SERPL ELPH-MCNC: 3.5 G/DL — SIGNIFICANT CHANGE UP (ref 3.3–5)
ALP SERPL-CCNC: 130 U/L — HIGH (ref 40–120)
ALT FLD-CCNC: 165 U/L — HIGH (ref 10–45)
ANION GAP SERPL CALC-SCNC: 10 MMOL/L — SIGNIFICANT CHANGE UP (ref 5–17)
ANION GAP SERPL CALC-SCNC: 12 MMOL/L — SIGNIFICANT CHANGE UP (ref 5–17)
AST SERPL-CCNC: 197 U/L — HIGH (ref 10–40)
BILIRUB SERPL-MCNC: 0.5 MG/DL — SIGNIFICANT CHANGE UP (ref 0.2–1.2)
BUN SERPL-MCNC: 32 MG/DL — HIGH (ref 7–23)
BUN SERPL-MCNC: 32 MG/DL — HIGH (ref 7–23)
CALCIUM SERPL-MCNC: 8.6 MG/DL — SIGNIFICANT CHANGE UP (ref 8.4–10.5)
CALCIUM SERPL-MCNC: 8.6 MG/DL — SIGNIFICANT CHANGE UP (ref 8.4–10.5)
CHLORIDE SERPL-SCNC: 100 MMOL/L — SIGNIFICANT CHANGE UP (ref 96–108)
CHLORIDE SERPL-SCNC: 102 MMOL/L — SIGNIFICANT CHANGE UP (ref 96–108)
CO2 SERPL-SCNC: 23 MMOL/L — SIGNIFICANT CHANGE UP (ref 22–31)
CO2 SERPL-SCNC: 25 MMOL/L — SIGNIFICANT CHANGE UP (ref 22–31)
CREAT SERPL-MCNC: 1.36 MG/DL — HIGH (ref 0.5–1.3)
CREAT SERPL-MCNC: 1.36 MG/DL — HIGH (ref 0.5–1.3)
CULTURE RESULTS: SIGNIFICANT CHANGE UP
GLUCOSE SERPL-MCNC: 101 MG/DL — HIGH (ref 70–99)
GLUCOSE SERPL-MCNC: 105 MG/DL — HIGH (ref 70–99)
HCT VFR BLD CALC: 28.5 % — LOW (ref 34.5–45)
HCT VFR BLD CALC: 29.7 % — LOW (ref 34.5–45)
HCT VFR BLD CALC: 29.9 % — LOW (ref 34.5–45)
HGB BLD-MCNC: 10.1 G/DL — LOW (ref 11.5–15.5)
HGB BLD-MCNC: 10.2 G/DL — LOW (ref 11.5–15.5)
HGB BLD-MCNC: 10.2 G/DL — LOW (ref 11.5–15.5)
MAGNESIUM SERPL-MCNC: 2.2 MG/DL — SIGNIFICANT CHANGE UP (ref 1.6–2.6)
MCHC RBC-ENTMCNC: 32.4 PG — SIGNIFICANT CHANGE UP (ref 27–34)
MCHC RBC-ENTMCNC: 32.9 PG — SIGNIFICANT CHANGE UP (ref 27–34)
MCHC RBC-ENTMCNC: 33.5 PG — SIGNIFICANT CHANGE UP (ref 27–34)
MCHC RBC-ENTMCNC: 34.3 GM/DL — SIGNIFICANT CHANGE UP (ref 32–36)
MCHC RBC-ENTMCNC: 34.5 GM/DL — SIGNIFICANT CHANGE UP (ref 32–36)
MCHC RBC-ENTMCNC: 35.4 GM/DL — SIGNIFICANT CHANGE UP (ref 32–36)
MCV RBC AUTO: 94.5 FL — SIGNIFICANT CHANGE UP (ref 80–100)
MCV RBC AUTO: 94.8 FL — SIGNIFICANT CHANGE UP (ref 80–100)
MCV RBC AUTO: 95.5 FL — SIGNIFICANT CHANGE UP (ref 80–100)
PHOSPHATE SERPL-MCNC: 5.3 MG/DL — HIGH (ref 2.5–4.5)
PLATELET # BLD AUTO: 182 K/UL — SIGNIFICANT CHANGE UP (ref 150–400)
PLATELET # BLD AUTO: 184 K/UL — SIGNIFICANT CHANGE UP (ref 150–400)
PLATELET # BLD AUTO: 197 K/UL — SIGNIFICANT CHANGE UP (ref 150–400)
POTASSIUM SERPL-MCNC: 4.3 MMOL/L — SIGNIFICANT CHANGE UP (ref 3.5–5.3)
POTASSIUM SERPL-MCNC: 4.5 MMOL/L — SIGNIFICANT CHANGE UP (ref 3.5–5.3)
POTASSIUM SERPL-SCNC: 4.3 MMOL/L — SIGNIFICANT CHANGE UP (ref 3.5–5.3)
POTASSIUM SERPL-SCNC: 4.5 MMOL/L — SIGNIFICANT CHANGE UP (ref 3.5–5.3)
PROT SERPL-MCNC: 6.7 G/DL — SIGNIFICANT CHANGE UP (ref 6–8.3)
RBC # BLD: 3 M/UL — LOW (ref 3.8–5.2)
RBC # BLD: 3.11 M/UL — LOW (ref 3.8–5.2)
RBC # BLD: 3.17 M/UL — LOW (ref 3.8–5.2)
RBC # FLD: 11.2 % — SIGNIFICANT CHANGE UP (ref 10.3–14.5)
SODIUM SERPL-SCNC: 135 MMOL/L — SIGNIFICANT CHANGE UP (ref 135–145)
SODIUM SERPL-SCNC: 137 MMOL/L — SIGNIFICANT CHANGE UP (ref 135–145)
SPECIMEN SOURCE: SIGNIFICANT CHANGE UP
TSH SERPL-MCNC: 0.69 UIU/ML — SIGNIFICANT CHANGE UP (ref 0.27–4.2)
WBC # BLD: 10.2 K/UL — SIGNIFICANT CHANGE UP (ref 3.8–10.5)
WBC # BLD: 11.7 K/UL — HIGH (ref 3.8–10.5)
WBC # BLD: 12.5 K/UL — HIGH (ref 3.8–10.5)
WBC # FLD AUTO: 10.2 K/UL — SIGNIFICANT CHANGE UP (ref 3.8–10.5)
WBC # FLD AUTO: 11.7 K/UL — HIGH (ref 3.8–10.5)
WBC # FLD AUTO: 12.5 K/UL — HIGH (ref 3.8–10.5)

## 2019-05-10 PROCEDURE — 12345: CPT | Mod: NC

## 2019-05-10 PROCEDURE — 99223 1ST HOSP IP/OBS HIGH 75: CPT | Mod: GC

## 2019-05-10 RX ORDER — ATORVASTATIN CALCIUM 80 MG/1
10 TABLET, FILM COATED ORAL AT BEDTIME
Refills: 0 | Status: DISCONTINUED | OUTPATIENT
Start: 2019-05-10 | End: 2019-05-13

## 2019-05-10 RX ORDER — LOSARTAN POTASSIUM 100 MG/1
50 TABLET, FILM COATED ORAL DAILY
Refills: 0 | Status: DISCONTINUED | OUTPATIENT
Start: 2019-05-10 | End: 2019-05-13

## 2019-05-10 RX ADMIN — MORPHINE SULFATE 4 MILLIGRAM(S): 50 CAPSULE, EXTENDED RELEASE ORAL at 00:12

## 2019-05-10 RX ADMIN — ATORVASTATIN CALCIUM 10 MILLIGRAM(S): 80 TABLET, FILM COATED ORAL at 21:14

## 2019-05-10 NOTE — CONSULT NOTE ADULT - PROBLEM SELECTOR RECOMMENDATION 2
with flank pain. S/p renal Bx of 5/8/19. Hgb dropped from 12 range to 10. Hgb now stable. CT scan not showing active bleeding.   - Monitor Hgb q12h  - If downtrends needs IR input for possible ablation  - Pain control

## 2019-05-10 NOTE — H&P ADULT - NSHPPHYSICALEXAM_GEN_ALL_CORE
Vital Signs Last 24 Hrs  T(C): 37.1 (10 May 2019 03:19), Max: 37.2 (09 May 2019 23:51)  T(F): 98.7 (10 May 2019 03:19), Max: 99 (09 May 2019 23:51)  HR: 66 (10 May 2019 03:19) (47 - 66)  BP: 124/72 (10 May 2019 03:19) (119/80 - 154/84)  BP(mean): 108 (09 May 2019 20:10) (108 - 108)  RR: 16 (10 May 2019 03:19) (16 - 18)  SpO2: 99% (10 May 2019 03:19) (96% - 99%)    General: NAD  Neurology: A&Ox3, nonfocal, CAIN x 4  Eyes: PERRLA/ EOMI, Gross vision intact  ENT/Neck: Neck supple, trachea midline, No JVD, Gross hearing intact  Respiratory: CTA B/L, No wheezing, rales, rhonchi  CV: RRR, S1S2, no murmurs, rubs or gallops  Abdominal: Soft, NT, ND +BS  : no CVA tenderness, left renal biopsy site with dressing c/d/i   Extremities: No edema, + peripheral pulses  Skin: No Rashes, Hematoma, Ecchymosis Vital Signs Last 24 Hrs  T(C): 37.1 (10 May 2019 03:19), Max: 37.2 (09 May 2019 23:51)  T(F): 98.7 (10 May 2019 03:19), Max: 99 (09 May 2019 23:51)  HR: 66 (10 May 2019 03:19) (47 - 66)  BP: 124/72 (10 May 2019 03:19) (119/80 - 154/84)  BP(mean): 108 (09 May 2019 20:10) (108 - 108)  RR: 16 (10 May 2019 03:19) (16 - 18)  SpO2: 99% (10 May 2019 03:19) (96% - 99%)    General: NAD  Neurology: A&Ox3, nonfocal, CAIN x 4  Eyes: PERRLA/ EOMI, Gross vision intact  ENT/Neck: Neck supple, trachea midline, No JVD, Gross hearing intact  Respiratory: CTA B/L, No wheezing, rales, rhonchi  CV: RRR, S1S2, no murmurs, rubs or gallops  Abdominal: Soft, NT, ND +BS  : no CVA tenderness, left renal biopsy site with dressing c/d/i   Extremities: No edema, + peripheral pulses  Skin: No Rashes, Hematoma, Ecchymosis  Psych: normal mood

## 2019-05-10 NOTE — H&P ADULT - ASSESSMENT
52 y/o Guyanese-speaking female with newly diagnosed immune complex GN, currently undergoing second opinion, s/p s/p left renal biopsy POD#1, HLD who presents with left sided flank pain a/w acute blood loss anemia 2/2 left perirenal hematoma. 54 y/o Guamanian-speaking female with newly diagnosed immune complex GN, currently undergoing second opinion, s/p left renal biopsy POD#1, HLD who presents with left sided flank pain a/w acute blood loss anemia 2/2 left perirenal hematoma.

## 2019-05-10 NOTE — PROGRESS NOTE ADULT - PROBLEM SELECTOR PLAN 5
Losartan 50 mg daily held on admission in setting of bleed and small bump in Cr.  - If Cr stabilizes over next 24 hours and BP remains adequate, would reinitiate ARB, especially in setting of proteinuria

## 2019-05-10 NOTE — CONSULT NOTE ADULT - ASSESSMENT
54yo Portuguese-speaking F w/ HLD, with hematuria and non-nephrotic proteinuria s/p renal biopsy on 5/8, who p/w left sided back pain, n/v. Found to have palak-renal hematoma on AP CT scan.

## 2019-05-10 NOTE — H&P ADULT - PROBLEM SELECTOR PLAN 6
- bradycardic to 47 on admission, now improved to 66  - EKG with sinus bradycardia, HR 50,   - pt. asymptomatic, on [employer] HR of 50 as outpatient  - monitor on telemetry, check TSH

## 2019-05-10 NOTE — H&P ADULT - PROBLEM SELECTOR PLAN 7
- CTAP with marked CBD dilatation, s/p cholecystectomy  - Alk-phos 132, bilirubin wnl  - pt. without RUQ pain, trend CMP, f/u GGT and US abdomen - CTAP with marked CBD dilatation, s/p cholecystectomy  - Alk-phos 132, bilirubin wnl  - f/u outpatient

## 2019-05-10 NOTE — H&P ADULT - NSHPREVIEWOFSYSTEMS_GEN_ALL_CORE
REVIEW OF SYSTEMS:    CONSTITUTIONAL: no weakness, fevers or chills  EYES: No visual changes  ENT: No vertigo or throat pain   NECK: No pain or stiffness  RESPIRATORY: No cough, wheezing, hemoptysis; No shortness of breath  CARDIOVASCULAR: No chest pain or palpitations  GASTROINTESTINAL: No abdominal or epigastric pain. +nausea, +vomiting, no hematemesis; No diarrhea or constipation. No melena or hematochezia.  GENITOURINARY: + left sided flank pain, No dysuria, frequency or hematuria  NEUROLOGICAL: No numbness or weakness  SKIN: No itching, rashes REVIEW OF SYSTEMS:    CONSTITUTIONAL: no weakness, fevers or chills  EYES: No visual changes  ENT: No vertigo or throat pain   NECK: No pain or stiffness  RESPIRATORY: No cough, wheezing, hemoptysis; No shortness of breath  CARDIOVASCULAR: No chest pain or palpitations  GASTROINTESTINAL: No abdominal or epigastric pain. +nausea, +vomiting, no hematemesis; No diarrhea or constipation. No melena or hematochezia.  GENITOURINARY: + left sided flank pain, No dysuria, frequency or hematuria  NEUROLOGICAL: No numbness or weakness  SKIN: No itching, rashes  PSYCH: No depression or anxiety

## 2019-05-10 NOTE — CONSULT NOTE ADULT - SUBJECTIVE AND OBJECTIVE BOX
[facility] DIVISION OF KIDNEY DISEASES AND HYPERTENSION -- INITIAL CONSULT NOTE  --------------------------------------------------------------------------------  HPI:  Pt is a 52yo Cameroonian-speaking F w/ HLD, with hematuria and non-nephrotic proteinuria s/p renal biopsy on 5/8, who p/w left sided back pain, n/v. Found to have palak-renal hematoma on AP CT scan.    Pt has Hx of hematuria and proteinuria. Had 0.7g protein which increased to 1.5g, so underwent an initial renal biopsy in 3/2019.  Was told there was immune complex deposition with 50% foot process effacement.   Came to Dr. Palma for second opinion.  SCr stable at ~1.1 for at least past 2 years.  GN/proteinuria w/up was sent which was unremarkable, just 0.9g proteinuria and mildly +REBECCA 1:80.  Therefore underwent repeat biopsy.   Repeat biopsy showing IgA with 40-50% interstitial fibrosis and tubular atrophy, with mod-severe vascular sclerosis. No crescents. No immuno complex dx noted.    PAST HISTORY  --------------------------------------------------------------------------------  PAST MEDICAL & SURGICAL HISTORY:  Hyperlipidemia  Proteinuria  S/P cholecystectomy    FAMILY HISTORY:    PAST SOCIAL HISTORY:    ALLERGIES & MEDICATIONS  --------------------------------------------------------------------------------  Allergies    No Known Allergies    Intolerances      Standing Inpatient Medications  atorvastatin 10 milliGRAM(s) Oral at bedtime    PRN Inpatient Medications      REVIEW OF SYSTEMS  --------------------------------------------------------------------------------  Gen: No weight changes, fatigue, fevers/chills, weakness  Skin: No rashes  Head/Eyes/Ears/Mouth: No headache; Normal hearing; Normal vision w/o blurriness; No sinus pain/discomfort, sore throat  Respiratory: No dyspnea, cough, wheezing, hemoptysis  CV: No chest pain, PND, orthopnea  GI: No abdominal pain, diarrhea, constipation, nausea, vomiting, melena, hematochezia  : No increased frequency, dysuria, hematuria, nocturia  MSK: No joint pain/swelling; no back pain; no edema  Neuro: No dizziness/lightheadedness, weakness, seizures, numbness, tingling  Heme: No easy bruising or bleeding  Endo: No heat/cold intolerance  Psych: No significant nervousness, anxiety, stress, depression    All other systems were reviewed and are negative, except as noted.    VITALS/PHYSICAL EXAM  --------------------------------------------------------------------------------  T(C): 35 (05-10-19 @ 04:40), Max: 37.2 (05-09-19 @ 23:51)  HR: 62 (05-10-19 @ 04:40) (47 - 66)  BP: 138/82 (05-10-19 @ 04:40) (119/80 - 154/84)  RR: 15 (05-10-19 @ 04:40) (15 - 18)  SpO2: 96% (05-10-19 @ 04:40) (96% - 99%)  Wt(kg): --  Height (cm): 154.94 (05-09-19 @ 13:54)  Weight (kg): 63.5 (05-09-19 @ 13:54)  BMI (kg/m2): 26.5 (05-09-19 @ 13:54)  BSA (m2): 1.62 (05-09-19 @ 13:54)      Physical Exam:  	Gen: NAD, well-appearing  	HEENT: PERRL, supple neck, clear oropharynx  	Pulm: CTA B/L  	CV: RRR, S1S2; no rub  	Back: No spinal or CVA tenderness; no sacral edema  	Abd: +BS, soft, nontender/nondistended  	: No suprapubic tenderness  	UE: Warm, FROM, no clubbing, intact strength; no edema; no asterixis  	LE: Warm, FROM, no clubbing, intact strength; no edema  	Neuro: No focal deficits, intact gait  	Psych: Normal affect and mood  	Skin: Warm, without rashes  	Vascular access:    LABS/STUDIES  --------------------------------------------------------------------------------              10.2   11.7  >-----------<  197      [05-10-19 @ 05:37]              29.9     135  |  102  |  32  ----------------------------<  105      [05-10-19 @ 05:37]  4.5   |  23  |  1.36        Ca     8.6     [05-10-19 @ 05:37]      Mg     2.2     [05-10-19 @ 05:37]      Phos  5.3     [05-10-19 @ 05:37]    TPro  6.7  /  Alb  3.5  /  TBili  0.5  /  DBili  x   /  AST  197  /  ALT  165  /  AlkPhos  130  [05-10-19 @ 05:37]    PT/INR: PT 12.7 , INR 1.11       [05-09-19 @ 21:33]  PTT: 27.8       [05-09-19 @ 21:33]      Creatinine Trend:  SCr 1.36 [05-10 @ 05:37]  SCr 1.37 [05-09 @ 14:55]    Urinalysis - [05-09-19 @ 17:32]      Color Light Yellow / Appearance Clear / SG 1.019 / pH 6.0      Gluc Negative / Ketone Negative  / Bili Negative / Urobili Negative       Blood Moderate / Protein 100 mg/dL / Leuk Est Negative / Nitrite Negative      RBC 6 / WBC 1 / Hyaline 2 / Gran  / Sq Epi  / Non Sq Epi 1 / Bacteria Negative      TSH 0.69      [05-10-19 @ 09:20] [facility] DIVISION OF KIDNEY DISEASES AND HYPERTENSION -- INITIAL CONSULT NOTE  --------------------------------------------------------------------------------  HPI:  Pt is a 52yo Sierra Leonean-speaking F w/ HLD, with hematuria and non-nephrotic proteinuria s/p renal biopsy on 5/8, who p/w left sided back pain, n/v. Found to have palak-renal hematoma on AP CT scan.    Pt has Hx of hematuria and proteinuria. Had 0.7g protein which increased to 1.5g, so underwent an initial renal biopsy in 3/2019.  Was told there was immune complex deposition with 50% foot process effacement.   Came to Dr. Palma for second opinion.  SCr stable at ~1.1 for at least past 2 years.  GN/proteinuria w/up was sent which was unremarkable, just 0.9g proteinuria and mildly +REBECCA 1:80.  Therefore underwent repeat biopsy.   Repeat biopsy showing IgA with 40-50% interstitial fibrosis and tubular atrophy, with mod-severe vascular sclerosis. No crescents. No immuno complex dx noted.    Family at bedside ( and children).   Pt having a little pain, and feels a little stomach upset, but otherwise ok.      PAST HISTORY  --------------------------------------------------------------------------------  PAST MEDICAL & SURGICAL HISTORY:  Hyperlipidemia  Proteinuria  S/P cholecystectomy    FAMILY HISTORY:    PAST SOCIAL HISTORY:    ALLERGIES & MEDICATIONS  --------------------------------------------------------------------------------  Allergies    No Known Allergies    Intolerances      Standing Inpatient Medications  atorvastatin 10 milliGRAM(s) Oral at bedtime    PRN Inpatient Medications      REVIEW OF SYSTEMS  --------------------------------------------------------------------------------  Gen: No weight changes  Skin: No rashes  Head/Eyes/Ears/Mouth: No headache  Respiratory: No dyspnea, cough  CV: No chest pain  GI: No abdominal pain  : No increased frequency, dysuria, +hematuria  MSK: No joint pain/swelling  Neuro: No dizziness/lightheadedness  Heme: No easy bruising or bleeding  Endo: No heat/cold intolerance  Psych: No significant nervousness    All other systems were reviewed and are negative, except as noted.    VITALS/PHYSICAL EXAM  --------------------------------------------------------------------------------  T(C): 35 (05-10-19 @ 04:40), Max: 37.2 (05-09-19 @ 23:51)  HR: 62 (05-10-19 @ 04:40) (47 - 66)  BP: 138/82 (05-10-19 @ 04:40) (119/80 - 154/84)  RR: 15 (05-10-19 @ 04:40) (15 - 18)  SpO2: 96% (05-10-19 @ 04:40) (96% - 99%)  Wt(kg): --  Height (cm): 154.94 (05-09-19 @ 13:54)  Weight (kg): 63.5 (05-09-19 @ 13:54)  BMI (kg/m2): 26.5 (05-09-19 @ 13:54)  BSA (m2): 1.62 (05-09-19 @ 13:54)      Physical Exam:  	Gen: NAD, well-appearing  	HEENT: anicteric  	Pulm: CTA B/L  	CV: RRR, S1S2; no rub  	Back: No spinal or CVA tenderness  	Abd: +BS, soft, nontender/nondistended  	: No suprapubic tenderness  	UE: Warm, no edema  	LE: Warm, no edema  	Neuro: follows commands  	Psych: Normal affect and mood  	Skin: Warm, without rashes    LABS/STUDIES  --------------------------------------------------------------------------------              10.2   11.7  >-----------<  197      [05-10-19 @ 05:37]              29.9     135  |  102  |  32  ----------------------------<  105      [05-10-19 @ 05:37]  4.5   |  23  |  1.36        Ca     8.6     [05-10-19 @ 05:37]      Mg     2.2     [05-10-19 @ 05:37]      Phos  5.3     [05-10-19 @ 05:37]    TPro  6.7  /  Alb  3.5  /  TBili  0.5  /  DBili  x   /  AST  197  /  ALT  165  /  AlkPhos  130  [05-10-19 @ 05:37]    PT/INR: PT 12.7 , INR 1.11       [05-09-19 @ 21:33]  PTT: 27.8       [05-09-19 @ 21:33]      Creatinine Trend:  SCr 1.36 [05-10 @ 05:37]  SCr 1.37 [05-09 @ 14:55]    Urinalysis - [05-09-19 @ 17:32]      Color Light Yellow / Appearance Clear / SG 1.019 / pH 6.0      Gluc Negative / Ketone Negative  / Bili Negative / Urobili Negative       Blood Moderate / Protein 100 mg/dL / Leuk Est Negative / Nitrite Negative      RBC 6 / WBC 1 / Hyaline 2 / Gran  / Sq Epi  / Non Sq Epi 1 / Bacteria Negative      TSH 0.69      [05-10-19 @ 09:20]

## 2019-05-10 NOTE — CONSULT NOTE ADULT - PROBLEM SELECTOR RECOMMENDATION 9
with Hx of hematuria and proteinuria. Saw Dr. Palma for 2nd opinion. S/p renal biopsy, showing mainly IgA nephropathy. SCr ~1.1 to 1.2 range from 1/2017 to 4/2019. SCr 1.3 on admission here, but stable.  - Monitor BMP  - Dose meds per eGFR  - Avoid NSAIDs, nephrotoxins with Hx of hematuria and proteinuria. Saw Dr. Palma for 2nd opinion. S/p renal biopsy, showing mainly IgA nephropathy (40-50% interstitial fibrosis and tubular atrophy, with mod-severe vascular sclerosis). SCr ~1.1 to 1.2 range from 1/2017 to 4/2019. SCr 1.3 on admission here, but stable.  - Monitor BMP  - C/w losartan  - Dose meds per eGFR  - Avoid NSAIDs, nephrotoxins

## 2019-05-10 NOTE — PROGRESS NOTE ADULT - PROBLEM SELECTOR PLAN 6
As above, concern for ?IgA nephropathy s/p biopsy.  - f/u biopsy results (may not be available until next week)  - Outpt f/u w/ Dr. Palma for next week

## 2019-05-10 NOTE — H&P ADULT - NSICDXPASTSURGICALHX_GEN_ALL_CORE_FT
PAST SURGICAL HISTORY:  No significant past surgical history PAST SURGICAL HISTORY:  S/P cholecystectomy

## 2019-05-10 NOTE — H&P ADULT - PROBLEM SELECTOR PLAN 5
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 Palma for second opinion, s/p repeat biopsy 5/9  - f/u biopsy results, hold ARB in setting of KATRINA

## 2019-05-10 NOTE — CONSULT NOTE ADULT - ATTENDING COMMENTS
Ct Scan notes significant palak-renal hematoma: no indication for intervention at this time  follow serial CBCs
CKD Stage 3 with mild KATRINA (1.3s) baseline 1.1-1.2in setting of acute bleed/perirenal hematoma s/p renal biopsy-monitor cr; stable in hospital  HTn- okay to restart losartan 50mg daily  anemia- monitor hb trend

## 2019-05-10 NOTE — H&P ADULT - PROBLEM SELECTOR PLAN 4
- SCr. 1.3, outpatient labs in April 1.15  - likely pre-renal  - s/p 1L LR bolus, trend SCR, renally dose meds

## 2019-05-10 NOTE — H&P ADULT - NSHPLABSRESULTS_GEN_ALL_CORE
10.2   12.5  )-----------( 184      ( 10 May 2019 02:41 )             29.7     05-09    141  |  101  |  38<H>  ----------------------------<  193<H>  4.4   |  23  |  1.37<H>    Ca    9.2      09 May 2019 14:55    TPro  7.7  /  Alb  4.0  /  TBili  0.2  /  DBili  x   /  AST  26  /  ALT  31  /  AlkPhos  132<H>  05-09    EKG - sinus bradycardia HR 50,     < from: CT Abdomen and Pelvis w/ IV Cont (05.09.19 @ 18:05) >    IMPRESSION:     Moderate left perirenal hematoma with delayed nephrogram. No active   bleeding identified.    Marked CBD dilatation status post cholecystectomy; suggest nonemergent GI   consultation.    < end of copied text > Labs reviewed   10.2   12.5  )-----------( 184      ( 10 May 2019 02:41 )             29.7     05-09    141  |  101  |  38<H>  ----------------------------<  193<H>  4.4   |  23  |  1.37<H>    Ca    9.2      09 May 2019 14:55    TPro  7.7  /  Alb  4.0  /  TBili  0.2  /  DBili  x   /  AST  26  /  ALT  31  /  AlkPhos  132<H>  05-09    EKG reviewed - sinus bradycardia HR 50,     Imaging reviewed  < from: CT Abdomen and Pelvis w/ IV Cont (05.09.19 @ 18:05) >    IMPRESSION:     Moderate left perirenal hematoma with delayed nephrogram. No active   bleeding identified.    Marked CBD dilatation status post cholecystectomy; suggest nonemergent GI   consultation.    < end of copied text >

## 2019-05-10 NOTE — PROGRESS NOTE ADULT - PROBLEM SELECTOR PLAN 1
Patient s/p left renal biopsy 5/9 with IR Dr. Carmona for work up of ?immune complex GN. CT A/P as above with moderate left perirenal hematoma. H/H stabilized and patient hemodynamically stable.   - urology consult, no intervention recommended at this time  - should patient become unstable, would need IR consult for possible selective angioembolization   - trend CBC q12h for now

## 2019-05-10 NOTE — PROGRESS NOTE ADULT - PROBLEM SELECTOR PLAN 4
Does NOT meet criteria for KATRINA. SCr. stable ~1.3, outpatient labs in April 1.15. Case d/w nephrology.  - Monitor BMP  - Avoid nephrotoxins

## 2019-05-10 NOTE — H&P ADULT - HISTORY OF PRESENT ILLNESS
52 y/o Georgian-speaking female with newly diagnosed immune complex GN, currently undergoing second opinion, s/p s/p left renal biopsy POD#1, HLD who presents with left sided flank pain. Pain is 8/10 associated with two episodes of NBNB vomiting, but no fevers/chills, hematuria, dysuria, change in urinary frequency. She took Tylenol for the pain without relief. Regarding her renal history, pt. established care with Dr. Palma in April for a second opinion for newly dx immune complex GN. One year prior to this she had seen Dr. Cruz Godoy for mild non nephrotic range proteinuria and normal creatinine, started on ACEi. Serological work up and US renal at the time was unremarkable. Proteinuria progressed from 700 to 1500mg/24 hours and pt. underwent a renal biopsy in March that was inconclusive, however had ?immune complex deposition. Serological work up was sent by Dr. Palma, and pt. was recommended for repeat biopsy, completed yesterday by IR Dr. Carmona. Remainder ROS negative for cp, palpitations, sob, abdominal pain, diarrhea, rashes, sick contacts, recent travel.     ED Course -  Initial Vitals - Tm 98.8, HR 47 --> 66, /84, RR 16, 98% on RA  Labs notable for - WBC 12.5, Hb 10.2, SCr. 1.37, Alk-phos 132, Lactate 3.6 --> 1.1, UA with blood and RBCs  CTAP with moderate left perirenal hematoma, no active bleeding, marked CBD dilatation   s/p 1L LR bolus, morphine 4mg x4 52 y/o Mongolian-speaking female with newly diagnosed immune complex GN, currently undergoing second opinion, s/p left renal biopsy POD#1, HLD who presents with left sided flank pain. Pain is 8/10 associated with two episodes of NBNB vomiting, but no fevers/chills, hematuria, dysuria, change in urinary frequency. She took Tylenol for the pain without relief. Regarding her renal history, pt. established care with Dr. Palma in April for a second opinion for newly dx immune complex GN. One year prior to this she had seen Dr. Cruz Godoy for mild non nephrotic range proteinuria and normal creatinine, started on ACEi. Serological work up and US renal at the time was unremarkable. Proteinuria progressed from 700 to 1500mg/24 hours and pt. underwent a renal biopsy in March that was inconclusive, however had ?immune complex deposition. Serological work up was sent by Dr. Palma, and pt. was recommended for repeat biopsy, completed yesterday by IR Dr. Carmona. Remainder ROS negative for cp, palpitations, sob, abdominal pain, diarrhea, rashes, sick contacts, recent travel.     ED Course -  Initial Vitals - Tm 98.8, HR 47 --> 66, /84, RR 16, 98% on RA  Labs notable for - WBC 12.5, Hb 10.2, SCr. 1.37, Alk-phos 132, Lactate 3.6 --> 1.1, UA with blood and RBCs  CTAP with moderate left perirenal hematoma, no active bleeding, marked CBD dilatation   s/p 1L LR bolus, morphine 4mg x4

## 2019-05-10 NOTE — PROGRESS NOTE ADULT - PROBLEM SELECTOR PLAN 8
CTAP with ?marked CBD dilatation, s/p cholecystectomy in the past. No RUQ tenderness, abd exam is benign. LFTs are unremarkable.   - Patient made aware of findings  - Knows she will need outpt f/u repeat imaging w/ consideration for GI evaluation

## 2019-05-10 NOTE — H&P ADULT - PROBLEM SELECTOR PLAN 3
- WBC 17 --> 12  - likely reactive in setting of bleed  - no localizing signs or symptoms of infection, UA negative for LE/nitrites  - trend WBC, f/u UCx and BCx, observe off abx

## 2019-05-10 NOTE — H&P ADULT - PROBLEM SELECTOR PLAN 1
- Hb 10.2, decreased from 12.1 on 5/9, hemodynamically stable   - 2/2 left perirenal hematoma after renal biopsy seen on CTAP  - active type and screen, blood consent in chart  - should pt. become unstable, consult IR for possible embolization   - CBC [id number], tx Hb <7 or if further acute drops or hemodynamically unstable - Hb 10.2, decreased from 12.1 on 5/9 (outpatient labs 11.6 in April 2019), hemodynamically stable   - 2/2 left perirenal hematoma after renal biopsy seen on CTAP  - active type and screen, blood consent in chart  - should pt. become unstable, consult IR for possible embolization   - CBC [id number], tx Hb <7 or if further acute drops or hemodynamically unstable

## 2019-05-10 NOTE — H&P ADULT - PROBLEM SELECTOR PLAN 2
- s/p left renal biopsy 5/9 with IR Dr. Carmona for work up of ?immune complex GN  - CTAP with moderate left perirenal hematoma, UA positive for blood and RBCs  - seen by urology, no intervention recommended at this time  - should patient become unstable, consult IR for posible selective angioembolization   - trend CBC [id number], tx Hb <7 or if further acute drops or hemodynamically unstable - s/p left renal biopsy 5/9 with IR Dr. Carmona for work up of ?immune complex GN  - CTAP with moderate left perirenal hematoma, UA positive for blood and RBCs  - seen by urology, no intervention recommended at this time  - should patient become unstable, consult IR for possible selective angioembolization   - trend CBC [id number], tx Hb <7 or if further acute drops or hemodynamically unstable

## 2019-05-11 LAB
HCT VFR BLD CALC: 25.9 % — LOW (ref 34.5–45)
HCT VFR BLD CALC: 26.8 % — LOW (ref 34.5–45)
HGB BLD-MCNC: 9 G/DL — LOW (ref 11.5–15.5)
HGB BLD-MCNC: 9.3 G/DL — LOW (ref 11.5–15.5)
MCHC RBC-ENTMCNC: 33.1 PG — SIGNIFICANT CHANGE UP (ref 27–34)
MCHC RBC-ENTMCNC: 33.3 PG — SIGNIFICANT CHANGE UP (ref 27–34)
MCHC RBC-ENTMCNC: 34.6 GM/DL — SIGNIFICANT CHANGE UP (ref 32–36)
MCHC RBC-ENTMCNC: 34.8 GM/DL — SIGNIFICANT CHANGE UP (ref 32–36)
MCV RBC AUTO: 95.5 FL — SIGNIFICANT CHANGE UP (ref 80–100)
MCV RBC AUTO: 95.6 FL — SIGNIFICANT CHANGE UP (ref 80–100)
PLATELET # BLD AUTO: 165 K/UL — SIGNIFICANT CHANGE UP (ref 150–400)
PLATELET # BLD AUTO: 167 K/UL — SIGNIFICANT CHANGE UP (ref 150–400)
RBC # BLD: 2.71 M/UL — LOW (ref 3.8–5.2)
RBC # BLD: 2.8 M/UL — LOW (ref 3.8–5.2)
RBC # FLD: 11.2 % — SIGNIFICANT CHANGE UP (ref 10.3–14.5)
RBC # FLD: 11.3 % — SIGNIFICANT CHANGE UP (ref 10.3–14.5)
WBC # BLD: 8 K/UL — SIGNIFICANT CHANGE UP (ref 3.8–10.5)
WBC # BLD: 9 K/UL — SIGNIFICANT CHANGE UP (ref 3.8–10.5)
WBC # FLD AUTO: 8 K/UL — SIGNIFICANT CHANGE UP (ref 3.8–10.5)
WBC # FLD AUTO: 9 K/UL — SIGNIFICANT CHANGE UP (ref 3.8–10.5)

## 2019-05-11 PROCEDURE — 99233 SBSQ HOSP IP/OBS HIGH 50: CPT

## 2019-05-11 PROCEDURE — 99233 SBSQ HOSP IP/OBS HIGH 50: CPT | Mod: GC

## 2019-05-11 RX ORDER — OXYCODONE AND ACETAMINOPHEN 5; 325 MG/1; MG/1
1 TABLET ORAL ONCE
Refills: 0 | Status: DISCONTINUED | OUTPATIENT
Start: 2019-05-11 | End: 2019-05-11

## 2019-05-11 RX ADMIN — OXYCODONE AND ACETAMINOPHEN 1 TABLET(S): 5; 325 TABLET ORAL at 11:07

## 2019-05-11 RX ADMIN — ATORVASTATIN CALCIUM 10 MILLIGRAM(S): 80 TABLET, FILM COATED ORAL at 21:56

## 2019-05-11 RX ADMIN — OXYCODONE AND ACETAMINOPHEN 1 TABLET(S): 5; 325 TABLET ORAL at 11:30

## 2019-05-11 RX ADMIN — LOSARTAN POTASSIUM 50 MILLIGRAM(S): 100 TABLET, FILM COATED ORAL at 05:19

## 2019-05-11 NOTE — PROGRESS NOTE ADULT - PROBLEM SELECTOR PLAN 1
with Hx of hematuria and proteinuria. Saw Dr. Palma for 2nd opinion. S/p renal biopsy, showing mainly IgA nephropathy (40-50% interstitial fibrosis and tubular atrophy, with mod-severe vascular sclerosis). SCr ~1.1 to 1.2 range from 1/2017 to 4/2019. SCr 1.3 on admission here, an remains stable.  - Monitor BMP  - Dose meds per eGFR  - Avoid NSAIDs, nephrotoxins.

## 2019-05-11 NOTE — PROGRESS NOTE ADULT - PROBLEM SELECTOR PLAN 1
Patient s/p left renal biopsy 5/9 with IR Dr. Carmona for work up of ?immune complex GN. CT A/P as above with moderate left perirenal hematoma.   there was a drop in Hgb today from 10.1 to 9.3. continue to monitor CBC q12, if there is another drop in hgb then will need repeat imaging -->renal US preferred over CT    Pt remains hemodynamically stable.   - urology consult, no intervention recommended at this time  - should patient become unstable, will need IR consult for possible selective angioembolization

## 2019-05-11 NOTE — CHART NOTE - NSCHARTNOTEFT_GEN_A_CORE
Hct trend 35.5-> 30.2-> 29.7-> 29.9-> 28.5-> 26.8  If continues to downtrend or becomes hemodynamically unstable, consider IR consult for selective angioembolization.

## 2019-05-11 NOTE — PROGRESS NOTE ADULT - PROBLEM SELECTOR PLAN 5
Losartan 50 mg daily held on admission in setting of bleed and small bump in Cr.  - will restart once Hgb remains stable especially in setting of proteinuria

## 2019-05-12 LAB
ANION GAP SERPL CALC-SCNC: 11 MMOL/L — SIGNIFICANT CHANGE UP (ref 5–17)
BUN SERPL-MCNC: 26 MG/DL — HIGH (ref 7–23)
CALCIUM SERPL-MCNC: 9.1 MG/DL — SIGNIFICANT CHANGE UP (ref 8.4–10.5)
CHLORIDE SERPL-SCNC: 103 MMOL/L — SIGNIFICANT CHANGE UP (ref 96–108)
CO2 SERPL-SCNC: 24 MMOL/L — SIGNIFICANT CHANGE UP (ref 22–31)
CREAT SERPL-MCNC: 1.2 MG/DL — SIGNIFICANT CHANGE UP (ref 0.5–1.3)
GLUCOSE SERPL-MCNC: 101 MG/DL — HIGH (ref 70–99)
HCT VFR BLD CALC: 25.6 % — LOW (ref 34.5–45)
HCT VFR BLD CALC: 26.7 % — LOW (ref 34.5–45)
HGB BLD-MCNC: 8.5 G/DL — LOW (ref 11.5–15.5)
HGB BLD-MCNC: 9.2 G/DL — LOW (ref 11.5–15.5)
MCHC RBC-ENTMCNC: 31.3 PG — SIGNIFICANT CHANGE UP (ref 27–34)
MCHC RBC-ENTMCNC: 32.9 PG — SIGNIFICANT CHANGE UP (ref 27–34)
MCHC RBC-ENTMCNC: 33.2 GM/DL — SIGNIFICANT CHANGE UP (ref 32–36)
MCHC RBC-ENTMCNC: 34.6 GM/DL — SIGNIFICANT CHANGE UP (ref 32–36)
MCV RBC AUTO: 94.1 FL — SIGNIFICANT CHANGE UP (ref 80–100)
MCV RBC AUTO: 95.2 FL — SIGNIFICANT CHANGE UP (ref 80–100)
PLATELET # BLD AUTO: 175 K/UL — SIGNIFICANT CHANGE UP (ref 150–400)
PLATELET # BLD AUTO: 184 K/UL — SIGNIFICANT CHANGE UP (ref 150–400)
POTASSIUM SERPL-MCNC: 4.5 MMOL/L — SIGNIFICANT CHANGE UP (ref 3.5–5.3)
POTASSIUM SERPL-SCNC: 4.5 MMOL/L — SIGNIFICANT CHANGE UP (ref 3.5–5.3)
RBC # BLD: 2.72 M/UL — LOW (ref 3.8–5.2)
RBC # BLD: 2.8 M/UL — LOW (ref 3.8–5.2)
RBC # FLD: 11.2 % — SIGNIFICANT CHANGE UP (ref 10.3–14.5)
RBC # FLD: 12.3 % — SIGNIFICANT CHANGE UP (ref 10.3–14.5)
SODIUM SERPL-SCNC: 138 MMOL/L — SIGNIFICANT CHANGE UP (ref 135–145)
WBC # BLD: 8.08 K/UL — SIGNIFICANT CHANGE UP (ref 3.8–10.5)
WBC # BLD: 9.5 K/UL — SIGNIFICANT CHANGE UP (ref 3.8–10.5)
WBC # FLD AUTO: 8.08 K/UL — SIGNIFICANT CHANGE UP (ref 3.8–10.5)
WBC # FLD AUTO: 9.5 K/UL — SIGNIFICANT CHANGE UP (ref 3.8–10.5)

## 2019-05-12 PROCEDURE — 74176 CT ABD & PELVIS W/O CONTRAST: CPT | Mod: 26

## 2019-05-12 PROCEDURE — 99233 SBSQ HOSP IP/OBS HIGH 50: CPT | Mod: GC

## 2019-05-12 PROCEDURE — 99233 SBSQ HOSP IP/OBS HIGH 50: CPT

## 2019-05-12 RX ADMIN — ATORVASTATIN CALCIUM 10 MILLIGRAM(S): 80 TABLET, FILM COATED ORAL at 21:17

## 2019-05-12 RX ADMIN — LOSARTAN POTASSIUM 50 MILLIGRAM(S): 100 TABLET, FILM COATED ORAL at 05:22

## 2019-05-12 NOTE — PROGRESS NOTE ADULT - PROBLEM SELECTOR PLAN 8
CTAP with ?marked CBD dilatation, s/p cholecystectomy in the past. No RUQ tenderness, abd exam is benign. LFTs are unremarkable.   - Patient made aware of findings  - Knows she will need outpt f/u repeat imaging w/ consideration for GI evaluation - pravastatin 20mg at bedtime interchanged to atorvastatin 10mg

## 2019-05-12 NOTE — PROGRESS NOTE ADULT - PROBLEM SELECTOR PLAN 5
Losartan 50 mg daily held on admission in setting of bleed and small bump in Cr.  - will restart once Hgb remains stable especially in setting of proteinuria As above, concern for ?IgA nephropathy s/p biopsy.  - f/u biopsy results (may not be available until next week)  - Outpt f/u w/ Dr. Palma for next week

## 2019-05-12 NOTE — PROGRESS NOTE ADULT - PROBLEM SELECTOR PLAN 7
Resolved. Stable ~60s. No longer requiring telemetry. CTAP with ?marked CBD dilatation, s/p cholecystectomy in the past. No RUQ tenderness, abd exam is benign. LFTs are unremarkable.   - Patient made aware of findings  - Knows she will need outpt f/u repeat imaging w/ consideration for GI evaluation

## 2019-05-12 NOTE — PROGRESS NOTE ADULT - PROBLEM SELECTOR PLAN 9
- pravastatin 20mg at bedtime interchanged to atorvastatin 10mg DVT PPX: SCDs in setting of hematoma

## 2019-05-12 NOTE — PROGRESS NOTE ADULT - PROBLEM SELECTOR PLAN 1
Patient s/p left renal biopsy 5/9 with IR Dr. Carmona for work up of ?immune complex GN. CT A/P as above with moderate left perirenal hematoma.   there was a drop in Hgb today from 10.1 to 9.3. continue to monitor CBC q12, if there is another drop in hgb then will need repeat imaging -->renal US preferred over CT    Pt remains hemodynamically stable.   - urology consult, no intervention recommended at this time  - should patient become unstable, will need IR consult for possible selective angioembolization Patient s/p left renal biopsy 5/9 with IR Dr. Carmona for work up of ?immune complex GN.   Hgb continues to slowly drop 8.5 <- 9.1<-9.3<-10.1  will get a repeat CT a/p to eval for expansion, if there is expansion then will get IR evaluation for embolization  continue to monitor CBC q12, transfuse for Hgb <7  Pt remains hemodynamically stable.   - urology consult, no intervention recommended at this time  - should patient become unstable, will need IR consult for possible selective angioembolization

## 2019-05-12 NOTE — PROGRESS NOTE ADULT - PROBLEM SELECTOR PLAN 6
As above, concern for ?IgA nephropathy s/p biopsy.  - f/u biopsy results (may not be available until next week)  - Outpt f/u w/ Dr. Palma for next week Resolved.

## 2019-05-12 NOTE — PROGRESS NOTE ADULT - PROBLEM SELECTOR PLAN 4
Does NOT meet criteria for KATRINA. SCr. stable ~1.3, outpatient labs in April 1.15. Case d/w nephrology.  - Monitor BMP  - Avoid nephrotoxins Losartan 50 mg daily held on admission in setting of bleed and small bump in Cr.  - will restart once Hgb remains stable especially in setting of proteinuria

## 2019-05-12 NOTE — PROGRESS NOTE ADULT - PROBLEM SELECTOR PLAN 10
DVT PPX: SCDs in setting of hematom
DVT PPX: SCDs in setting of hematoma
DVT PPX: SCDs in setting of hematoma

## 2019-05-13 ENCOUNTER — TRANSCRIPTION ENCOUNTER (OUTPATIENT)
Age: 54
End: 2019-05-13

## 2019-05-13 VITALS
OXYGEN SATURATION: 96 % | HEART RATE: 60 BPM | DIASTOLIC BLOOD PRESSURE: 68 MMHG | TEMPERATURE: 98 F | SYSTOLIC BLOOD PRESSURE: 114 MMHG | RESPIRATION RATE: 16 BRPM

## 2019-05-13 DIAGNOSIS — R93.89 ABNORMAL FINDINGS ON DIAGNOSTIC IMAGING OF OTHER SPECIFIED BODY STRUCTURES: ICD-10-CM

## 2019-05-13 LAB
ANION GAP SERPL CALC-SCNC: 12 MMOL/L — SIGNIFICANT CHANGE UP (ref 5–17)
BUN SERPL-MCNC: 28 MG/DL — HIGH (ref 7–23)
CALCIUM SERPL-MCNC: 9.2 MG/DL — SIGNIFICANT CHANGE UP (ref 8.4–10.5)
CHLORIDE SERPL-SCNC: 103 MMOL/L — SIGNIFICANT CHANGE UP (ref 96–108)
CO2 SERPL-SCNC: 24 MMOL/L — SIGNIFICANT CHANGE UP (ref 22–31)
CREAT SERPL-MCNC: 1.13 MG/DL — SIGNIFICANT CHANGE UP (ref 0.5–1.3)
GLUCOSE SERPL-MCNC: 91 MG/DL — SIGNIFICANT CHANGE UP (ref 70–99)
HCT VFR BLD CALC: 26.2 % — LOW (ref 34.5–45)
HCT VFR BLD CALC: 27.1 % — LOW (ref 34.5–45)
HGB BLD-MCNC: 8.6 G/DL — LOW (ref 11.5–15.5)
HGB BLD-MCNC: 9.3 G/DL — LOW (ref 11.5–15.5)
MCHC RBC-ENTMCNC: 31 PG — SIGNIFICANT CHANGE UP (ref 27–34)
MCHC RBC-ENTMCNC: 32.6 PG — SIGNIFICANT CHANGE UP (ref 27–34)
MCHC RBC-ENTMCNC: 32.8 GM/DL — SIGNIFICANT CHANGE UP (ref 32–36)
MCHC RBC-ENTMCNC: 34.2 GM/DL — SIGNIFICANT CHANGE UP (ref 32–36)
MCV RBC AUTO: 94.6 FL — SIGNIFICANT CHANGE UP (ref 80–100)
MCV RBC AUTO: 95.3 FL — SIGNIFICANT CHANGE UP (ref 80–100)
PLATELET # BLD AUTO: 194 K/UL — SIGNIFICANT CHANGE UP (ref 150–400)
PLATELET # BLD AUTO: 236 K/UL — SIGNIFICANT CHANGE UP (ref 150–400)
POTASSIUM SERPL-MCNC: 4.5 MMOL/L — SIGNIFICANT CHANGE UP (ref 3.5–5.3)
POTASSIUM SERPL-SCNC: 4.5 MMOL/L — SIGNIFICANT CHANGE UP (ref 3.5–5.3)
RBC # BLD: 2.77 M/UL — LOW (ref 3.8–5.2)
RBC # BLD: 2.85 M/UL — LOW (ref 3.8–5.2)
RBC # FLD: 11.1 % — SIGNIFICANT CHANGE UP (ref 10.3–14.5)
RBC # FLD: 12.2 % — SIGNIFICANT CHANGE UP (ref 10.3–14.5)
SODIUM SERPL-SCNC: 139 MMOL/L — SIGNIFICANT CHANGE UP (ref 135–145)
WBC # BLD: 7.38 K/UL — SIGNIFICANT CHANGE UP (ref 3.8–10.5)
WBC # BLD: 9.4 K/UL — SIGNIFICANT CHANGE UP (ref 3.8–10.5)
WBC # FLD AUTO: 7.38 K/UL — SIGNIFICANT CHANGE UP (ref 3.8–10.5)
WBC # FLD AUTO: 9.4 K/UL — SIGNIFICANT CHANGE UP (ref 3.8–10.5)

## 2019-05-13 PROCEDURE — 96375 TX/PRO/DX INJ NEW DRUG ADDON: CPT

## 2019-05-13 PROCEDURE — 82803 BLOOD GASES ANY COMBINATION: CPT

## 2019-05-13 PROCEDURE — 85014 HEMATOCRIT: CPT

## 2019-05-13 PROCEDURE — 82330 ASSAY OF CALCIUM: CPT

## 2019-05-13 PROCEDURE — 96376 TX/PRO/DX INJ SAME DRUG ADON: CPT

## 2019-05-13 PROCEDURE — 82947 ASSAY GLUCOSE BLOOD QUANT: CPT

## 2019-05-13 PROCEDURE — 84295 ASSAY OF SERUM SODIUM: CPT

## 2019-05-13 PROCEDURE — 81001 URINALYSIS AUTO W/SCOPE: CPT

## 2019-05-13 PROCEDURE — 86850 RBC ANTIBODY SCREEN: CPT

## 2019-05-13 PROCEDURE — 84132 ASSAY OF SERUM POTASSIUM: CPT

## 2019-05-13 PROCEDURE — 99239 HOSP IP/OBS DSCHRG MGMT >30: CPT

## 2019-05-13 PROCEDURE — 80048 BASIC METABOLIC PNL TOTAL CA: CPT

## 2019-05-13 PROCEDURE — 83735 ASSAY OF MAGNESIUM: CPT

## 2019-05-13 PROCEDURE — 84702 CHORIONIC GONADOTROPIN TEST: CPT

## 2019-05-13 PROCEDURE — 84100 ASSAY OF PHOSPHORUS: CPT

## 2019-05-13 PROCEDURE — 80053 COMPREHEN METABOLIC PANEL: CPT

## 2019-05-13 PROCEDURE — 84443 ASSAY THYROID STIM HORMONE: CPT

## 2019-05-13 PROCEDURE — 82435 ASSAY OF BLOOD CHLORIDE: CPT

## 2019-05-13 PROCEDURE — 96374 THER/PROPH/DIAG INJ IV PUSH: CPT | Mod: XU

## 2019-05-13 PROCEDURE — 93005 ELECTROCARDIOGRAM TRACING: CPT

## 2019-05-13 PROCEDURE — 85610 PROTHROMBIN TIME: CPT

## 2019-05-13 PROCEDURE — 74177 CT ABD & PELVIS W/CONTRAST: CPT

## 2019-05-13 PROCEDURE — 83605 ASSAY OF LACTIC ACID: CPT

## 2019-05-13 PROCEDURE — 83690 ASSAY OF LIPASE: CPT

## 2019-05-13 PROCEDURE — 86901 BLOOD TYPING SEROLOGIC RH(D): CPT

## 2019-05-13 PROCEDURE — 87040 BLOOD CULTURE FOR BACTERIA: CPT

## 2019-05-13 PROCEDURE — 99285 EMERGENCY DEPT VISIT HI MDM: CPT | Mod: 25

## 2019-05-13 PROCEDURE — 86900 BLOOD TYPING SEROLOGIC ABO: CPT

## 2019-05-13 PROCEDURE — 87086 URINE CULTURE/COLONY COUNT: CPT

## 2019-05-13 PROCEDURE — 74176 CT ABD & PELVIS W/O CONTRAST: CPT

## 2019-05-13 PROCEDURE — 85027 COMPLETE CBC AUTOMATED: CPT

## 2019-05-13 PROCEDURE — 85730 THROMBOPLASTIN TIME PARTIAL: CPT

## 2019-05-13 PROCEDURE — 99233 SBSQ HOSP IP/OBS HIGH 50: CPT

## 2019-05-13 RX ORDER — LOSARTAN POTASSIUM 100 MG/1
1 TABLET, FILM COATED ORAL
Qty: 0 | Refills: 0 | DISCHARGE
Start: 2019-05-13

## 2019-05-13 RX ORDER — ACETAMINOPHEN 500 MG
650 TABLET ORAL EVERY 6 HOURS
Refills: 0 | Status: DISCONTINUED | OUTPATIENT
Start: 2019-05-13 | End: 2019-05-13

## 2019-05-13 RX ORDER — LOSARTAN POTASSIUM 100 MG/1
1 TABLET, FILM COATED ORAL
Qty: 0 | Refills: 0 | DISCHARGE

## 2019-05-13 RX ADMIN — LOSARTAN POTASSIUM 50 MILLIGRAM(S): 100 TABLET, FILM COATED ORAL at 05:09

## 2019-05-13 RX ADMIN — Medication 650 MILLIGRAM(S): at 11:30

## 2019-05-13 NOTE — PROGRESS NOTE ADULT - PROBLEM SELECTOR PROBLEM 2
Anemia due to blood loss
CKD (chronic kidney disease), stage III
CKD (chronic kidney disease), stage III
Hematoma
Anemia due to blood loss

## 2019-05-13 NOTE — PROGRESS NOTE ADULT - PROBLEM SELECTOR PROBLEM 1
Perinephric hematoma
CKD (chronic kidney disease), stage III
Hematoma
Hematoma
Perinephric hematoma

## 2019-05-13 NOTE — DISCHARGE NOTE PROVIDER - HOSPITAL COURSE
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         ·  Problem: Perinephric hematoma.  Plan: Patient s/p left renal biopsy 5/9 with IR Dr. Carmona for work up of ?immune complex GN.     CBC stable        ·  Problem: Anemia due to blood loss.  Plan: - Management as above.             ·  Problem: Leukocytosis.  Plan: resolved.             ·  Problem: Essential hypertension. Plan: Losartan 50 mg restarted,               Problem: Immune complex nephropathy. Plan: As above, concern for ?IgA nephropathy s/p biopsy.    - s/pbiopsy results (may not be available until next week)    - Outpt f/u w/ Dr. Palma for next week.            Problem: Bradycardia.Plan: Resolved.            ·  Problem: Common bile duct dilatation. Plan: CTAP with ?marked CBD dilatation, s/p cholecystectomy in the past. No RUQ tenderness, abd exam is benign. LFTs are unremarkable.     - Patient made aware of findings    - Knows she will need outpt f/u repeat imaging w/ consideration for GI evaluation.        ·  Problem: Hyperlipidemia. Plan: - pravastatin 20mg at bedtime interchanged to atorvastatin 10mg. ADMISSION        54 y/o Citizen of [place]-speaking female with newly diagnosed immune complex GN, currently undergoing second opinion, s/p left renal biopsy POD#1, HLD who presents with left sided flank pain. Pain is 8/10 associated with two episodes of NBNB vomiting, but no fevers/chills, hematuria, dysuria, change in urinary frequency. She took Tylenol for the pain without relief. Regarding her renal history, pt. established care with Dr. Palma in April for a second opinion for newly dx immune complex GN. One year prior to this she had seen Dr. Cruz Godoy for mild non nephrotic range proteinuria and normal creatinine, started on ACEi. Serological work up and US renal at the time was unremarkable. Proteinuria progressed from 700 to 1500mg/24 hours and pt. underwent a renal biopsy in March that was inconclusive, however had ?immune complex deposition. Serological work up was sent by Dr. Palma, and pt. was recommended for repeat biopsy, completed yesterday by IR Dr. Carmona. Remainder ROS negative for cp, palpitations, sob, abdominal pain, diarrhea, rashes, sick contacts, recent travel.         ED Course -    Initial Vitals - Tm 98.8, HR 47 --> 66, /84, RR 16, 98% on RA    Labs notable for - WBC 12.5, Hb 10.2, SCr. 1.37, Alk-phos 132, Lactate 3.6 --> 1.1, UA with blood and RBCs    CTAP with moderate left perirenal hematoma, no active bleeding, marked CBD dilatation     s/p 1L LR bolus, morphine 4mg x4        HOSPITAL COURSE        Patient was admitted to telemetry for further management. There were no acute events on telemetry. She was evaluated by nephrology and urology. CBC was monitored closely. Her Hgb slightly downtrended from baseline 12 to ~10, then further to ~9 slowly over the proceeding 48 hours. Her Hgb stabilized ~9. CT A/P was repeated and showed no change to her perinephric hematoma w/ extension to the RP. She remained hemodynamically stable w/ no indication for IR intervention.         Patient was told the prelim results of her kidney bx (IgA nephropathy) while inpatient. She will f/u with her nephrologist (Dr. Palma) within one week for final biopsy results and for repeat CBC. On the day of D/C, the patient's condition had improved and she was stable for D/c home. Plan of care was d/w her in detail (she elected for her daughter to translate on the day of D/C).

## 2019-05-13 NOTE — PROGRESS NOTE ADULT - PROBLEM SELECTOR PLAN 3
WBC 17 now downtrended to 11.7. No fevers, no evidence of infection. Suspect reactive leukocytosis in setting of bleed  - Observe off ABX at this time  - BCX were sent on admission
resolved
Completely resolved. No clinical evidence of infection. BCX negative. No fevers/chills. Suspect reactive in the setting of bleeding
resolved

## 2019-05-13 NOTE — PROGRESS NOTE ADULT - PROBLEM SELECTOR PLAN 9
CT A/P w/ ?LLL PNA vs. atelectasis. No clinical correlation for PNA, patient w/o cough or SOB, no fevers or chills, no leukocytosis, lungs clear on exam. ?atelectasis in setting of L flank pain from her hematoma.  - No indication for ABX at this time

## 2019-05-13 NOTE — PROGRESS NOTE ADULT - SUBJECTIVE AND OBJECTIVE BOX
[facility] DIVISION OF KIDNEY DISEASES AND HYPERTENSION -- FOLLOW UP NOTE  --------------------------------------------------------------------------------  Chief Complaint:  renal hematoma    24 hour events/subjective:  Had back pain this AM. Feeling better now.       PAST HISTORY  --------------------------------------------------------------------------------  No significant changes to PMH, PSH, FHx, SHx, unless otherwise noted    ALLERGIES & MEDICATIONS  --------------------------------------------------------------------------------  Allergies    No Known Allergies    Intolerances      Standing Inpatient Medications  atorvastatin 10 milliGRAM(s) Oral at bedtime  losartan 50 milliGRAM(s) Oral daily    PRN Inpatient Medications      REVIEW OF SYSTEMS  --------------------------------------------------------------------------------  Gen: no fatigue  Respiratory: No dyspnea, cough  CV: No chest pain  GI: No abdominal pain  MSK: No joint pain/swelling; no edema    VITALS/PHYSICAL EXAM  --------------------------------------------------------------------------------  T(C): 36.5 (05-11-19 @ 13:43), Max: 37.2 (05-11-19 @ 00:23)  HR: 53 (05-11-19 @ 13:43) (53 - 66)  BP: 114/73 (05-11-19 @ 13:43) (105/69 - 130/73)  RR: 17 (05-11-19 @ 13:43) (16 - 18)  SpO2: 97% (05-11-19 @ 13:43) (93% - 97%)  Wt(kg): --        05-10-19 @ 07:01  -  05-11-19 @ 07:00  --------------------------------------------------------  IN: 420 mL / OUT: 0 mL / NET: 420 mL    05-11-19 @ 07:01  -  05-11-19 @ 15:22  --------------------------------------------------------  IN: 600 mL / OUT: 1 mL / NET: 599 mL      Physical Exam:  	Gen: NAD, well-appearing  	Pulm: CTA B/L  	CV: RRR, S1S2; no rub  	Back: mild flank pain  	Abd: +BS, soft, nontender/nondistended  	LE: Warm, no edema  	Neuro: No focal deficits  	Psych: Normal affect and mood  	Skin: Warm, without rashes    LABS/STUDIES  --------------------------------------------------------------------------------              9.3    9.0   >-----------<  167      [05-11-19 @ 06:37]              26.8     137  |  100  |  32  ----------------------------<  101      [05-10-19 @ 16:17]  4.3   |  25  |  1.36        Ca     8.6     [05-10-19 @ 16:17]      Mg     2.2     [05-10-19 @ 05:37]      Phos  5.3     [05-10-19 @ 05:37]    TPro  6.7  /  Alb  3.5  /  TBili  0.5  /  DBili  x   /  AST  197  /  ALT  165  /  AlkPhos  130  [05-10-19 @ 05:37]    PT/INR: PT 12.7 , INR 1.11       [05-09-19 @ 21:33]  PTT: 27.8       [05-09-19 @ 21:33]      Creatinine Trend:  SCr 1.36 [05-10 @ 16:17]  SCr 1.36 [05-10 @ 05:37]  SCr 1.37 [05-09 @ 14:55]    Urinalysis - [05-09-19 @ 17:32]      Color Light Yellow / Appearance Clear / SG 1.019 / pH 6.0      Gluc Negative / Ketone Negative  / Bili Negative / Urobili Negative       Blood Moderate / Protein 100 mg/dL / Leuk Est Negative / Nitrite Negative      RBC 6 / WBC 1 / Hyaline 2 / Gran  / Sq Epi  / Non Sq Epi 1 / Bacteria Negative      TSH 0.69      [05-10-19 @ 09:20]
[facility] DIVISION OF KIDNEY DISEASES AND HYPERTENSION -- FOLLOW UP NOTE  --------------------------------------------------------------------------------  Chief Complaint:/subjective: no complaints, feels better; on/off back pain    24 hour events: fluctuating hb        PAST HISTORY  --------------------------------------------------------------------------------  No significant changes to PMH, PSH, FHx, SHx, unless otherwise noted    ALLERGIES & MEDICATIONS  --------------------------------------------------------------------------------  Allergies    No Known Allergies    Intolerances      Standing Inpatient Medications  atorvastatin 10 milliGRAM(s) Oral at bedtime  losartan 50 milliGRAM(s) Oral daily    PRN Inpatient Medications  acetaminophen   Tablet .. 650 milliGRAM(s) Oral every 6 hours PRN      REVIEW OF SYSTEMS  --------------------------------------------------------------------------------  Gen: No weight changes, fatigue, fevers/chills, weakness  Skin: No rashes  Head/Eyes/Ears/Mouth: No headache;   Respiratory: No dyspnea, cough  CV: No chest pain, PND, orthopnea  GI: No abdominal pain, diarrhea, constipation, nausea, vomiting  : No increased frequency, dysuria, hematuria, nocturia  MSK: No joint pain/swelling; no back pain; no edema  Neuro: No dizziness/lightheadedness, weakness  Heme: No easy bruising or bleeding  Psych: No significant nervousness, anxiety, stress, depression    All other systems were reviewed and are negative, except as noted.    VITALS/PHYSICAL EXAM  --------------------------------------------------------------------------------  T(C): 36.6 (05-13-19 @ 14:30), Max: 37.6 (05-12-19 @ 20:37)  HR: 60 (05-13-19 @ 14:30) (56 - 62)  BP: 114/68 (05-13-19 @ 14:30) (97/63 - 114/68)  RR: 16 (05-13-19 @ 14:30) (16 - 18)  SpO2: 96% (05-13-19 @ 14:30) (95% - 96%)  Wt(kg): --  Adult Advanced Hemodynamics Last 24 Hrs  ABP: --  ABP(mean): --  CVP(mm Hg): --  CO: --  CI: --  PA: --  PA(mean): --  PCWP: --  SVR: --  SVRI: --        05-12-19 @ 07:01  -  05-13-19 @ 07:00  --------------------------------------------------------  IN: 920 mL / OUT: 0 mL / NET: 920 mL    05-13-19 @ 07:01  -  05-13-19 @ 15:02  --------------------------------------------------------  IN: 780 mL / OUT: 1 mL / NET: 779 mL      Physical Exam:  	Gen: NAD,   	HEENT: no jvp  	Pulm: CTA B/L  	CV: RRR, S1S2; no rub  	Back:   no sacral edema  	Abd: +BS, soft, nontender/nondistended  	: No suprapubic tenderness  	Ext: no edema  	Neuro:awake  	Psych: alert  	Skin: Warm,   	Vascular access:    LABS/STUDIES  --------------------------------------------------------------------------------              8.6    7.38  >-----------<  194      [05-13-19 @ 08:16]              26.2     Hemoglobin: 8.6 g/dL (05-13-19 @ 08:16)  Hemoglobin: 9.2 g/dL (05-12-19 @ 16:32)    Platelet Count - Automated: 194 K/uL (05-13-19 @ 08:16)  Platelet Count - Automated: 184 K/uL (05-12-19 @ 16:32)    139  |  103  |  28  ----------------------------<  91      [05-13-19 @ 06:42]  4.5   |  24  |  1.13        Ca     9.2     [05-13-19 @ 06:42]            Creatinine Trend:  SCr 1.13 [05-13 @ 06:42]  SCr 1.20 [05-12 @ 06:32]  SCr 1.36 [05-10 @ 16:17]  SCr 1.36 [05-10 @ 05:37]  SCr 1.37 [05-09 @ 14:55]    Urinalysis - [05-09-19 @ 17:32]      Color Light Yellow / Appearance Clear / SG 1.019 / pH 6.0      Gluc Negative / Ketone Negative  / Bili Negative / Urobili Negative       Blood Moderate / Protein 100 mg/dL / Leuk Est Negative / Nitrite Negative      RBC 6 / WBC 1 / Hyaline 2 / Gran  / Sq Epi  / Non Sq Epi 1 / Bacteria Negative      TSH 0.69      [05-10-19 @ 09:20]
[facility] DIVISION OF KIDNEY DISEASES AND HYPERTENSION -- FOLLOW UP NOTE  --------------------------------------------------------------------------------  Chief Complaint:  renal hematoma    24 hour events/subjective:  Having mild back pain.       PAST HISTORY  --------------------------------------------------------------------------------  No significant changes to PMH, PSH, FHx, SHx, unless otherwise noted    ALLERGIES & MEDICATIONS  --------------------------------------------------------------------------------  Allergies    No Known Allergies    Intolerances      Standing Inpatient Medications  atorvastatin 10 milliGRAM(s) Oral at bedtime  losartan 50 milliGRAM(s) Oral daily    PRN Inpatient Medications      REVIEW OF SYSTEMS  --------------------------------------------------------------------------------  Gen: no fatigue  Respiratory: No dyspnea, cough  CV: No chest pain  GI: +back pain  MSK: No joint pain/swelling; no edema    VITALS/PHYSICAL EXAM  --------------------------------------------------------------------------------  T(C): 36.6 (05-12-19 @ 04:58), Max: 37.3 (05-11-19 @ 20:25)  HR: 60 (05-12-19 @ 04:58) (53 - 63)  BP: 110/73 (05-12-19 @ 04:58) (110/73 - 117/71)  RR: 16 (05-12-19 @ 04:58) (16 - 18)  SpO2: 96% (05-12-19 @ 04:58) (95% - 97%)  Wt(kg): --        05-11-19 @ 07:01  -  05-12-19 @ 07:00  --------------------------------------------------------  IN: 840 mL / OUT: 1 mL / NET: 839 mL    05-12-19 @ 07:01  -  05-12-19 @ 11:31  --------------------------------------------------------  IN: 360 mL / OUT: 0 mL / NET: 360 mL      Physical Exam:  	Gen: NAD, well-appearing  	Pulm: CTA B/L  	CV: RRR, S1S2; no rub  	Back: mild flank pain  	Abd: +BS, soft, nontender/nondistended  	LE: Warm, no edema  	Neuro: No focal deficits  	Psych: Normal affect and mood  	Skin: Warm, without rashes    LABS/STUDIES  --------------------------------------------------------------------------------              8.5    8.08  >-----------<  175      [05-12-19 @ 09:43]              25.6     138  |  103  |  26  ----------------------------<  101      [05-12-19 @ 06:32]  4.5   |  24  |  1.20        Ca     9.1     [05-12-19 @ 06:32]            Creatinine Trend:  SCr 1.20 [05-12 @ 06:32]  SCr 1.36 [05-10 @ 16:17]  SCr 1.36 [05-10 @ 05:37]  SCr 1.37 [05-09 @ 14:55]    Urinalysis - [05-09-19 @ 17:32]      Color Light Yellow / Appearance Clear / SG 1.019 / pH 6.0      Gluc Negative / Ketone Negative  / Bili Negative / Urobili Negative       Blood Moderate / Protein 100 mg/dL / Leuk Est Negative / Nitrite Negative      RBC 6 / WBC 1 / Hyaline 2 / Gran  / Sq Epi  / Non Sq Epi 1 / Bacteria Negative      TSH 0.69      [05-10-19 @ 09:20]
Patient is a 53y old  Female who presents with a chief complaint of left perirenal hematoma (10 May 2019 09:56)    Patient declined  services, asked to have her daughter (Edith) translate instead     SUBJECTIVE / OVERNIGHT EVENTS: Patient seen and examined. Endorses some mild left flank tenderness largely unchanged from prior. No light-headedness or dizziness. No chest pain or SOB. Urinating without difficulty     MEDICATIONS  (STANDING):  atorvastatin 10 milliGRAM(s) Oral at bedtime  losartan 50 milliGRAM(s) Oral daily    MEDICATIONS  (PRN):  acetaminophen   Tablet .. 650 milliGRAM(s) Oral every 6 hours PRN Severe Pain (7 - 10)      I&O's Summary    12 May 2019 07:01  -  13 May 2019 07:00  --------------------------------------------------------  IN: 920 mL / OUT: 0 mL / NET: 920 mL    13 May 2019 07:01  -  13 May 2019 12:16  --------------------------------------------------------  IN: 360 mL / OUT: 0 mL / NET: 360 mL    Vital Signs Last 24 Hrs  T(C): 36.9 (13 May 2019 04:56), Max: 37.6 (12 May 2019 20:37)  T(F): 98.5 (13 May 2019 04:56), Max: 99.7 (12 May 2019 20:37)  HR: 56 (13 May 2019 04:56) (56 - 63)  BP: 108/67 (13 May 2019 04:56) (97/63 - 114/73)  BP(mean): --  RR: 18 (13 May 2019 04:56) (16 - 18)  SpO2: 96% (13 May 2019 04:56) (95% - 96%)    PHYSICAL EXAM:      Constitutional: Well-appearing, NAD, lying in bed, appears stated age    Eyes: EOMI, PERRLA, clear conjunctiva    ENMT: No pharyngeal erythema, mucus membranes moist    Neck: No JVD    Back: mild tenderness to palpation at L CVA    Respiratory: Lungs clear to auscultation     Cardiovascular: Regular rate and rhythm, S1S2+, no murmurs appreciated.  No LE edema    Gastrointestinal: Soft, non-tender, non-distended, bowel sounds positive all four quadrants    Extremities: no clubbing or cyanosis    Vascular: 2+ pulses radially and dorsalis pedis    Neurological: Alert and oriented to name, place, and date.  Cranial nerves II-XII intact.  No focal neurological deficits.  5/5 motor strength all four extremities    Skin: Warm and dry. No rashes    Lymph Nodes: No cervical lymphadenopathy    Musculoskeletal: No joint swelling or erythema    Psychiatric: Pleasant affect    LABS:              (05-13 @ 08:16)                      8.6  7.38 )-----------( 194                 26.2    Neutrophils = -- (--%)  Lymphocytes = -- (--%)  Eosinophils = -- (--%)  Basophils = -- (--%)  Monocytes = -- (--%)  Bands = --%    05-13    139  |  103  |  28<H>  ----------------------------<  91  4.5   |  24  |  1.13    Ca    9.2      13 May 2019 06:42    RADIOLOGY & ADDITIONAL TESTS:    Imaging Personally Reviewed:  < from: CT Abdomen and Pelvis No Cont (05.12.19 @ 14:48) >    IMPRESSION:     Unchanged left perinephric hematoma with extension into the   retroperitoneum as on prior CT of 5/9/2019.    New left lower lobe consolidation may represent atelectasis versus   pneumonia.    Mild thickening of the endometrium, correlation with menstrual status is   recommended.                      YANCI SHEPHERD M.D., RADIOLOGY RESIDENT  This document has been electronically signed.  FARZANEH CUADRA M.D., ATTENDING RADIOLOGIST  This document has been electronically signed. May 12 2019 3:53PM        < end of copied text >    Consultant(s) Notes Reviewed:  nephrology  Care Discussed with Consultants/Other Providers: nephrology
Patient remains hemodynamically stable.  Hct stable, 30.2->29.7.    T(F): 98.7, Max: 99 (05-09-19 @ 23:51)  HR: 66  BP: 124/72  SpO2: 99%    05-10 @ 02:41  WBC 12.5  / Hct 29.7  / SCr --       05-09 @ 20:26  WBC 17.2  / Hct 30.2  / SCr --       < from: CT Abdomen and Pelvis w/ IV Cont (05.09.19 @ 18:05) >  ADRENALS: Within normal limits.  KIDNEYS/URETERS: Small left renal lower pole defect likely represents the   renal biopsy site. There is a moderate left perirenal hematoma. Slightly   delayed nephrogram of the left kidney. No hydronephrosis. No active   extravasation of contrast. Left renal artery and vein are unremarkable.   Right kidney is within normal limits.  BLADDER: Within normal limits.  REPRODUCTIVE ORGANS: Uterus and adnexa within normal limits.  BOWEL: No bowel obstruction. Appendix is normal.  PERITONEUM: Trace perihepatic hematoma. Retroperitoneal blood tracks into   the upper abdomen and the pelvis.    IMPRESSION:   Moderate left perirenal hematoma with delayed nephrogram. No active   bleeding identified.  Marked CBD dilatation status post cholecystectomy; suggest nonemergent GI   consultation.
Patient is a 53y old  Female who presents with a chief complaint of left perirenal hematoma (11 May 2019 15:20)      SUBJECTIVE / OVERNIGHT EVENTS:    Pt c/w increased left flank pain overnight. Pt also c/o increased fatigue.     ROS: ( - ) Fever, ( - )Chills,  ( - )Nausea/Vomiting, ( - ) Cough, ( - )Shortness of breath, ( - )Chest Pain    MEDICATIONS  (STANDING):  atorvastatin 10 milliGRAM(s) Oral at bedtime  losartan 50 milliGRAM(s) Oral daily    MEDICATIONS  (PRN):      T(C): 36.5 (05-11 @ 13:43), Max: 37.2 (05-11 @ 00:23)   HR: 53   BP: 114/73   RR: 17   SpO2: 97%    PHYSICAL EXAM:  GENERAL: NAD, well-developed  CHEST/LUNG: Clear to auscultation bilaterally; No wheeze  HEART: Regular rate and rhythm; No murmurs, rubs, or gallops  ABDOMEN: Soft, Nontender, Nondistended; Bowel sounds present  BACK: there is mild tenderness over the left midback around the renal biopsy puncture site, no hematoma or ecchymosis is noted.  EXTREMITIES:  2+ Peripheral Pulses, No clubbing, cyanosis, or edema  PSYCH: AAOx3  NEUROLOGY: non-focal    LABS:                        9.3    9.0   )-----------( 167      ( 11 May 2019 06:37 )             26.8      05-10    137  |  100  |  32<H>  ----------------------------<  101<H>  4.3   |  25  |  1.36<H>    Ca    8.6      10 May 2019 16:17  Phos  5.3     05-10  Mg     2.2     05-10    TPro  6.7  /  Alb  3.5  /  TBili  0.5  /  DBili  x   /  AST  197<H>  /  ALT  165<H>  /  AlkPhos  130<H>  05-10       CAPILLARY BLOOD GLUCOSE          RADIOLOGY & ADDITIONAL TESTS:    Imaging Personally Reviewed:  Consultant(s) Notes Reviewed:    Care Discussed with Consultants/Other Providers: Nephrology Dr. Grigsby
Patient is a 53y old  Female who presents with a chief complaint of left perirenal hematoma (10 May 2019 09:56)    Patient declined  services, asked to have her daughter (Edith) translate instead     SUBJECTIVE / OVERNIGHT EVENTS: Patient seen and examined. She has some mild abd pain this AM, otherwise no complaints. Denies back pain. No light-headedness or dizziness. No chest pain or SOB. Urinating without difficulty     MEDICATIONS  (STANDING):  atorvastatin 10 milliGRAM(s) Oral at bedtime    MEDICATIONS  (PRN):    I&O's Summary    Vital Signs Last 24 Hrs  T(C): 36.8 (10 May 2019 11:27), Max: 37.2 (09 May 2019 23:51)  T(F): 98.2 (10 May 2019 11:27), Max: 99 (09 May 2019 23:51)  HR: 64 (10 May 2019 11:27) (47 - 66)  BP: 118/73 (10 May 2019 11:27) (118/73 - 154/84)  BP(mean): 108 (09 May 2019 20:10) (108 - 108)  RR: 16 (10 May 2019 11:27) (15 - 18)  SpO2: 98% (10 May 2019 11:27) (96% - 99%)    PHYSICAL EXAM:      Constitutional: Well-appearing, NAD, lying in bed, appears stated age    Eyes: EOMI, PERRLA, clear conjunctiva    ENMT: No pharyngeal erythema, mucus membranes moist    Neck: No JVD    Back: No spinal tenderness or kyphosis    Respiratory: Lungs clear to auscultation     Cardiovascular: Regular rate and rhythm, S1S2+, no murmurs appreciated.  No LE edema    Gastrointestinal: Soft, non-tender, non-distended, bowel sounds positive all four quadrants    Extremities: no clubbing or cyanosis    Vascular: 2+ pulses radially and dorsalis pedis    Neurological: Alert and oriented to name, place, and date.  Cranial nerves II-XII intact.  No focal neurological deficits.  5/5 motor strength all four extremities    Skin: Warm and dry. No rashes    Lymph Nodes: No cervical lymphadenopathy    Musculoskeletal: No joint swelling or erythema    Psychiatric: Pleasant affect    LABS:                        10.2   11.7  )-----------( 197      ( 10 May 2019 05:37 )             29.9     05-10    135  |  102  |  32<H>  ----------------------------<  105<H>  4.5   |  23  |  1.36<H>    Ca    8.6      10 May 2019 05:37  Phos  5.3     05-10  Mg     2.2     05-10    TPro  6.7  /  Alb  3.5  /  TBili  0.5  /  DBili  x   /  AST  197<H>  /  ALT  165<H>  /  AlkPhos  130<H>  05-10    PT/INR - ( 09 May 2019 21:33 )   PT: 12.7 sec;   INR: 1.11 ratio         PTT - ( 09 May 2019 21:33 )  PTT:27.8 sec      Urinalysis Basic - ( 09 May 2019 17:32 )    Color: Light Yellow / Appearance: Clear / S.019 / pH: x  Gluc: x / Ketone: Negative  / Bili: Negative / Urobili: Negative   Blood: x / Protein: 100 mg/dL / Nitrite: Negative   Leuk Esterase: Negative / RBC: 6 /hpf / WBC 1 /HPF   Sq Epi: x / Non Sq Epi: 1 /hpf / Bacteria: Negative      RADIOLOGY & ADDITIONAL TESTS:    Imaging Personally Reviewed:  < from: CT Abdomen and Pelvis w/ IV Cont (19 @ 18:05) >    IMPRESSION:     Moderate left perirenal hematoma with delayed nephrogram. No active   bleeding identified.    Marked CBD dilatation status post cholecystectomy; suggest nonemergent GI   consultation.    < end of copied text >    Consultant(s) Notes Reviewed:    Care Discussed with Consultants/Other Providers:
Patient is a 53y old  Female who presents with a chief complaint of left perirenal hematoma (12 May 2019 11:31)      SUBJECTIVE / OVERNIGHT EVENTS:    No acute overnight events. Pt still with persistent left flank pain. Pt states that she is unable to lay on her left side 2/2 pain.    ROS: ( - ) Fever, ( - )Chills,  ( - )Nausea/Vomiting, ( - ) Cough, ( - )Shortness of breath, ( - )Chest Pain    MEDICATIONS  (STANDING):  atorvastatin 10 milliGRAM(s) Oral at bedtime  losartan 50 milliGRAM(s) Oral daily    MEDICATIONS  (PRN):      T(C): 36.8 (05-12 @ 13:58), Max: 37.3 (05-11 @ 20:25)   HR: 63   BP: 114/73   RR: 16   SpO2: 95%    PHYSICAL EXAM:  GENERAL: NAD, well-developed  CHEST/LUNG: Clear to auscultation bilaterally; No wheeze  HEART: Regular rate and rhythm; No murmurs, rubs, or gallops  ABDOMEN: Soft, Nontender, Nondistended; Bowel sounds present  BACK: there is mild tenderness over the left midback around the renal biopsy puncture site, no hematoma or ecchymosis is noted.  EXTREMITIES:  2+ Peripheral Pulses, No clubbing, cyanosis, or edema  PSYCH: AAOx3  NEUROLOGY: non-focal  LABS:                        8.5    8.08  )-----------( 175      ( 12 May 2019 09:43 )             25.6      05-12    138  |  103  |  26<H>  ----------------------------<  101<H>  4.5   |  24  |  1.20    Ca    9.1      12 May 2019 06:32         CAPILLARY BLOOD GLUCOSE          RADIOLOGY & ADDITIONAL TESTS:    Imaging Personally Reviewed:  Consultant(s) Notes Reviewed:    Care Discussed with Consultants/Other Providers:

## 2019-05-13 NOTE — DISCHARGE NOTE PROVIDER - NSDCCPCAREPLAN_GEN_ALL_CORE_FT
PRINCIPAL DISCHARGE DIAGNOSIS  Diagnosis: Perinephric hematoma  Assessment and Plan of Treatment: stable, outpt follow up roberto Carmona      SECONDARY DISCHARGE DIAGNOSES  Diagnosis: Hematoma  Assessment and Plan of Treatment: stable,    Diagnosis: Hyperlipidemia  Assessment and Plan of Treatment: Continue with your home dose of medications.    Diagnosis: Essential hypertension  Assessment and Plan of Treatment: Low salt diet  Activity as tolerated.  Take all medication as prescribed.  Follow up with your medical doctor for routine blood pressure monitoring at your next visit.  Notify your doctor if you have any of the following symptoms:   Dizziness, Lightheadedness, Blurry vision, Headache, Chest pain, Shortness of breath      Diagnosis: Immune complex nephropathy  Assessment and Plan of Treatment: Follow up with Dr. Palma as an outpatient.    Diagnosis: Common bile duct dilatation  Assessment and Plan of Treatment: Follow up with Gastroenterologist as an outpatient. PRINCIPAL DISCHARGE DIAGNOSIS  Diagnosis: Perinephric hematoma  Assessment and Plan of Treatment: You had some bleeding around your kidney after your biopsy last week. Your blood counts have now stabilized. Your repeat CT scan showed no evidence of new bleeding.  Please follow up with Dr. Palma and your PCP for repeat blood work within one week.  If you have worsening left sided pain, lightheadedness, blood in your urine please contact your doctor, as you may need urgent evaluation.      SECONDARY DISCHARGE DIAGNOSES  Diagnosis: Immune complex nephropathy  Assessment and Plan of Treatment: Follow up with Dr. Palma as an outpatient for your final biopsy results.    Diagnosis: Common bile duct dilatation  Assessment and Plan of Treatment: Follow up with Gastroenterologist as an outpatient. There is currently no need for further inpatient workup.    Diagnosis: Hyperlipidemia  Assessment and Plan of Treatment: Continue with your home dose of medications.    Diagnosis: Hematoma  Assessment and Plan of Treatment:     Diagnosis: Essential hypertension  Assessment and Plan of Treatment: Continue your home losartan.

## 2019-05-13 NOTE — DISCHARGE NOTE NURSING/CASE MANAGEMENT/SOCIAL WORK - NSDCDPATPORTLINK_GEN_ALL_CORE
You can access the [facility] Portal, offered by [facility], by registering with the following website: [email or web address]

## 2019-05-13 NOTE — PROGRESS NOTE ADULT - ASSESSMENT
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
52yo Korean-speaking F w/ HLD, with hematuria and non-nephrotic proteinuria s/p renal biopsy on 5/8, who p/w left sided back pain, n/v. Found to have palak-renal hematoma on AP CT scan.
53F p/w L sided flank pain for 1 day, POD #2 from a left renal biopsy for evaluation of proteinuria and microscopic hematuria with CT finding of left moderate perirenal hematoma with no active extravasation     - pain control  - monitor H&H, stable  - no acute urological surgical intervention recommended at this time  - If patient becomes unstable or has significant drop in Hct please consider IR consult for possible selective angioembolization  - Please call if questions
54yo Occitan-speaking F w/ HLD, with hematuria and non-nephrotic proteinuria s/p renal biopsy on 5/8, who p/w left sided back pain, n/v. Found to have palak-renal hematoma on AP CT scan.
54yo Urdu-speaking F w/ HLD, with hematuria and non-nephrotic proteinuria s/p renal biopsy on 5/8, who p/w left sided back pain, n/v. Found to have palak-renal hematoma on AP CT scan.
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

## 2019-05-13 NOTE — DISCHARGE NOTE PROVIDER - CARE PROVIDERS DIRECT ADDRESSES
[email or web address],DirectAddress_Unknown

## 2019-05-13 NOTE — PROGRESS NOTE ADULT - ATTENDING COMMENTS
Andre Reyes, M.D.  [facility] Medicine Attending  Office: [phone]   Pager: [phone]
#perirenal hematoma/anemia-renal biopsy complicated by left perirenal hematoma and now with gradually decreasing Hb and mild left sided back pain.  repeat CT scan of abdomen ( non contrast) stable hematoma; cbc from this afternoon pending  #lizandro on CKD stage3-hemodynamic/anemia related- cr stable and back to baseline  HTn/proteinuria-cont ARB
renal biopsy complicated by renal hematoma and now with gradually decreasing Hb and mild left sided back pain. Please repeat CT scan of abdomen ( non contrast) and if hematoma is any bigger, please call IR. Creat normal.
repeat hb from this pm pending. no hematuria/ back pain/ lightheadedness.
Plan d/w patient and her  and daughter at bedside, nephrology (Dr. Friedman) and medicine NP (CARMINE).    Darinel David M.D.  Hospitalist  Pager: [phone]
Plan d/w patient and her  and daughter at bedside, nephrology (Dr. Grigsby) and medicine NP (CARMINE). Potential D/C this evening if H/H remains stable this afternoon.    Discharge planning time 38 minutes.     Darinel David M.D.  Hospitalist  Pager: [phone]
Andre Reyes, M.D.  [facility] Attending  Office: [phone]   Pager: [phone]

## 2019-05-13 NOTE — DISCHARGE NOTE NURSING/CASE MANAGEMENT/SOCIAL WORK - NSDCFUADDAPPT_GEN_ALL_CORE_FT
Follow up with Dr. Palma for biospy results.  Follow up with Dr. Carmona as an outpatient.   Follow up with Gastroenterologist for Common bile duct dilation.

## 2019-05-13 NOTE — PROGRESS NOTE ADULT - PROBLEM SELECTOR PLAN 1
Patient s/p left renal biopsy 5/9 with IR Dr. Carmona for work up of immune complex GN. CT A/P repeated yesterday shows no change in hematoma or extension into RP space. No evidence of increased bleeding.  - No indication for IR embolization at this time  - Spoke with nephrology (Dr. Grigsby). If H/H remains stable this afternoon, would favor D/C home w/ outpt f/u

## 2019-05-13 NOTE — DISCHARGE NOTE PROVIDER - CARE PROVIDER_API CALL
Yossi Palma)  Nephrology  [address] [place] [address]  Phone: [phone]  Fax: [phone]  Follow Up Time:     Roberth Carmona)  Diagnostic Radiology  [address] [place] [address]  Phone: [phone]  Fax: [phone]  Follow Up Time:     Jose Hoover)  Internal Medicine  [address] [place] [address]  Phone: [phone]  Fax: [phone]  Follow Up Time:

## 2019-05-13 NOTE — PROGRESS NOTE ADULT - PROBLEM SELECTOR PLAN 2
Hgb ~10 for the past 12 hours, decreased from baseline ~12. Likely due to hematoma as above.   - Management as above
- Management as above
Hgb with slight downtrend since admission, although largely unchanged over the past 24-48 hours. CT A/P as above.  - Management as above
with Hx of hematuria and proteinuria. Saw Dr. Palma for 2nd opinion. S/p renal biopsy, showing mainly IgA nephropathy (40-50% interstitial fibrosis and tubular atrophy, with mod-severe vascular sclerosis). SCr ~1.1 to 1.2 range from 1/2017 to 4/2019. SCr 1.3 on admission here, an remains stable.  - Monitor BMP  - Dose meds per eGFR  - Avoid NSAIDs, nephrotoxins.
with Hx of hematuria and proteinuria. Saw Dr. Palma for 2nd opinion. S/p renal biopsy, showing mainly IgA nephropathy (40-50% interstitial fibrosis and tubular atrophy, with mod-severe vascular sclerosis). SCr ~1.1 to 1.2 range from 1/2017 to 4/2019. SCr 1.3 on admission here, an remains stable.  - Monitor BMP  - Dose meds per eGFR  - Avoid NSAIDs, nephrotoxins.
with flank pain. S/p renal Bx of 5/8/19. Hgb dropped from 12 range to 10, now 9. Might be dilutional vs. active bleeding.  - Recheck Hgb this afternoon.  - If further drop, needs repeat imaging (sono/CT scan) stat.  - Will need IR consult if active bleeding.   - Spoke with chiquita Jorge over phone about plan.    If IR consulted, please have them speak to Jorge at [phone].  - Monitor Hgb q12h  - Pain control.
- Management as above

## 2019-05-13 NOTE — PROGRESS NOTE ADULT - PROBLEM SELECTOR PLAN 1
with flank pain. S/p renal Bx of 5/8/19. Hgb dropped from 12 range to 10, now stable 8-9s ; repeat cbc pending

## 2019-05-14 ENCOUNTER — INBOUND DOCUMENT (OUTPATIENT)
Age: 54
End: 2019-05-14

## 2019-05-14 LAB — SURGICAL PATHOLOGY STUDY: SIGNIFICANT CHANGE UP

## 2019-05-15 LAB
CULTURE RESULTS: SIGNIFICANT CHANGE UP
CULTURE RESULTS: SIGNIFICANT CHANGE UP
SPECIMEN SOURCE: SIGNIFICANT CHANGE UP
SPECIMEN SOURCE: SIGNIFICANT CHANGE UP

## 2019-05-17 PROBLEM — R80.9 PROTEINURIA, UNSPECIFIED: Chronic | Status: ACTIVE | Noted: 2019-05-09

## 2019-05-17 PROBLEM — E78.5 HYPERLIPIDEMIA, UNSPECIFIED: Chronic | Status: ACTIVE | Noted: 2019-05-10

## 2019-05-22 ENCOUNTER — APPOINTMENT (OUTPATIENT)
Dept: NEPHROLOGY | Facility: CLINIC | Age: 54
End: 2019-05-22
Payer: MEDICAID

## 2019-05-22 VITALS
DIASTOLIC BLOOD PRESSURE: 71 MMHG | SYSTOLIC BLOOD PRESSURE: 123 MMHG | HEART RATE: 54 BPM | OXYGEN SATURATION: 99 % | HEIGHT: 58 IN | WEIGHT: 125 LBS | BODY MASS INDEX: 26.24 KG/M2

## 2019-05-22 DIAGNOSIS — Z00.00 ENCOUNTER FOR GENERAL ADULT MEDICAL EXAMINATION W/OUT ABNORMAL FINDINGS: ICD-10-CM

## 2019-05-22 PROCEDURE — 99214 OFFICE O/P EST MOD 30 MIN: CPT

## 2019-05-22 RX ORDER — PRAVASTATIN SODIUM 20 MG/1
20 TABLET ORAL
Refills: 0 | Status: ACTIVE | COMMUNITY
Start: 2019-04-11

## 2019-05-22 NOTE — REASON FOR VISIT
[Follow-Up] : a follow-up visit [Spouse] : spouse [Family Member] : family member [FreeTextEntry1] : for IgAnephropathy

## 2019-05-22 NOTE — HISTORY OF PRESENT ILLNESS
[FreeTextEntry1] : Ms. 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 She was in usual state of health with just hx of hyperlipidemia saw Dr Cruz Godoy a year ago for mild non nephrotic range proteinuria and normal crt. She was started on a ACEI and monitored. She had serological workup and sonogram that were not revealing. She then progressed and her proteinuria went from 700 to 1500mg/ 24 hours. She then had a kidney bx done last month that was non conclusive.  It was limited due to number of glomeruli but had ? immune complex deposition. EF showed 50% foot proces effacement and no tissue for IF. \par Since last visit, had a repeat kidney bx done by IR at [facility] and following the kidney bx had a moderate hematoma and hgb drop from 12-10-8 and then dc on 9g/dl. She had a repeat ct scan that showed no change in size on dc and stayed in hospital for 4 days. She had Bx showing IgA Neph with significant chronic changes and lot of scarring in the biopsy. She is here with hiccups, cough and still having pain at the bx site. She is not having any hematuria and BP is controlled. She was dc on tylenol which she is taking as needed and losartan and statin. She has n/v on and off but slightly better. She is here with daughter and .

## 2019-05-22 NOTE — PHYSICAL EXAM
[General Appearance - Alert] : alert [General Appearance - In No Acute Distress] : in no acute distress [Sclera] : the sclera and conjunctiva were normal [PERRL With Normal Accommodation] : pupils were equal in size, round, and reactive to light [Extraocular Movements] : extraocular movements were intact [Outer Ear] : the ears and nose were normal in appearance [Oropharynx] : the oropharynx was normal [Auscultation Breath Sounds / Voice Sounds] : lungs were clear to auscultation bilaterally [Heart Rate And Rhythm] : heart rate was normal and rhythm regular [Heart Sounds] : normal S1 and S2 [Heart Sounds Gallop] : no gallops [Murmurs] : no murmurs [Heart Sounds Pericardial Friction Rub] : no pericardial rub [Edema] : there was no peripheral edema [Bowel Sounds] : normal bowel sounds [Abdomen Soft] : soft [Abdomen Tenderness] : non-tender [] : no hepato-splenomegaly [Abdomen Mass (___ Cm)] : no abdominal mass palpated [FreeTextEntry1] : bx site noted on left flank- some old scar- pain at the site to the top left, otherwise no other tenderness noted [Abnormal Walk] : normal gait [Skin Color & Pigmentation] : normal skin color and pigmentation [Cranial Nerves] : cranial nerves 2-12 were intact [Oriented To Time, Place, And Person] : oriented to person, place, and time

## 2019-05-22 NOTE — ASSESSMENT
[FreeTextEntry1] : Ms. CHASE is a 53 year old female with recently dx of IgA nephropathy now here post kidney bx\par \par IgA neph- ckd Stage 3:- no treatment given chronic disease and based on STOP-IgA trial, no benefit of steroids in this case. Pt and daughter understand. BP control and avoiding nephrotoxic insults will help. Risk of progression is around 30% but could stay stable if bp is controlled and conservative management. \par \par Recent hematoma:- check cbc today and renal sonogram next week. Explained to her that the bx site has some pain still and hematoma felt and likely cause of pleuritic pain and cough, advised that tylenol only option for pain and should take q6-8 hours as needed\par \par CKD care and other tests- ordered\par \par f/u in 2 months and will d/w with Dr Godoy( primary neph)\par \par

## 2019-05-23 LAB
ALBUMIN SERPL ELPH-MCNC: 3.7 G/DL
ANION GAP SERPL CALC-SCNC: 12 MMOL/L
APPEARANCE: CLEAR
BACTERIA: NEGATIVE
BASOPHILS # BLD AUTO: 0.01 K/UL
BASOPHILS NFR BLD AUTO: 0.1 %
BILIRUBIN URINE: NEGATIVE
BLOOD URINE: ABNORMAL
BUN SERPL-MCNC: 36 MG/DL
CALCIUM SERPL-MCNC: 9.1 MG/DL
CHLORIDE SERPL-SCNC: 104 MMOL/L
CO2 SERPL-SCNC: 24 MMOL/L
COLOR: NORMAL
CREAT SERPL-MCNC: 1.08 MG/DL
CREAT SPEC-SCNC: 38 MG/DL
CREAT/PROT UR: 0.4 RATIO
EOSINOPHIL # BLD AUTO: 0.2 K/UL
EOSINOPHIL NFR BLD AUTO: 3 %
GLUCOSE QUALITATIVE U: NEGATIVE
GLUCOSE SERPL-MCNC: 100 MG/DL
HCT VFR BLD CALC: 31.2 %
HGB BLD-MCNC: 9.7 G/DL
HYALINE CASTS: 2 /LPF
IMM GRANULOCYTES NFR BLD AUTO: 0.4 %
KETONES URINE: NEGATIVE
LEUKOCYTE ESTERASE URINE: NEGATIVE
LYMPHOCYTES # BLD AUTO: 1.45 K/UL
LYMPHOCYTES NFR BLD AUTO: 21.7 %
MAN DIFF?: NORMAL
MCHC RBC-ENTMCNC: 30.1 PG
MCHC RBC-ENTMCNC: 31.1 GM/DL
MCV RBC AUTO: 96.9 FL
MICROSCOPIC-UA: NORMAL
MONOCYTES # BLD AUTO: 0.58 K/UL
MONOCYTES NFR BLD AUTO: 8.7 %
NEUTROPHILS # BLD AUTO: 4.42 K/UL
NEUTROPHILS NFR BLD AUTO: 66.1 %
NITRITE URINE: NEGATIVE
PH URINE: 6
PHOSPHATE SERPL-MCNC: 3.9 MG/DL
PLATELET # BLD AUTO: 385 K/UL
POTASSIUM SERPL-SCNC: 4.7 MMOL/L
PROT UR-MCNC: 16 MG/DL
PROTEIN URINE: NORMAL
RBC # BLD: 3.22 M/UL
RBC # FLD: 11.5 %
RED BLOOD CELLS URINE: 1 /HPF
SODIUM SERPL-SCNC: 140 MMOL/L
SPECIFIC GRAVITY URINE: 1.01
SQUAMOUS EPITHELIAL CELLS: 1 /HPF
UROBILINOGEN URINE: NORMAL
WBC # FLD AUTO: 6.69 K/UL
WHITE BLOOD CELLS URINE: 1 /HPF

## 2019-06-03 ENCOUNTER — FORM ENCOUNTER (OUTPATIENT)
Age: 54
End: 2019-06-03

## 2019-06-04 ENCOUNTER — OUTPATIENT (OUTPATIENT)
Dept: OUTPATIENT SERVICES | Facility: HOSPITAL | Age: 54
LOS: 1 days | End: 2019-06-04
Payer: MEDICAID

## 2019-06-04 ENCOUNTER — APPOINTMENT (OUTPATIENT)
Dept: ULTRASOUND IMAGING | Facility: CLINIC | Age: 54
End: 2019-06-04
Payer: MEDICAID

## 2019-06-04 DIAGNOSIS — Z90.49 ACQUIRED ABSENCE OF OTHER SPECIFIED PARTS OF DIGESTIVE TRACT: Chronic | ICD-10-CM

## 2019-06-04 DIAGNOSIS — Z00.8 ENCOUNTER FOR OTHER GENERAL EXAMINATION: ICD-10-CM

## 2019-06-04 PROCEDURE — 76775 US EXAM ABDO BACK WALL LIM: CPT

## 2019-06-04 PROCEDURE — 76775 US EXAM ABDO BACK WALL LIM: CPT | Mod: 26

## 2019-07-02 LAB
BASOPHILS # BLD AUTO: 0.02 K/UL
BASOPHILS NFR BLD AUTO: 0.4 %
EOSINOPHIL # BLD AUTO: 0.56 K/UL
EOSINOPHIL NFR BLD AUTO: 10.2 %
HCT VFR BLD CALC: 34.3 %
HGB BLD-MCNC: 10.3 G/DL
IMM GRANULOCYTES NFR BLD AUTO: 0.2 %
LYMPHOCYTES # BLD AUTO: 1.51 K/UL
LYMPHOCYTES NFR BLD AUTO: 27.5 %
MAN DIFF?: NORMAL
MCHC RBC-ENTMCNC: 29.2 PG
MCHC RBC-ENTMCNC: 30 GM/DL
MCV RBC AUTO: 97.2 FL
MONOCYTES # BLD AUTO: 0.4 K/UL
MONOCYTES NFR BLD AUTO: 7.3 %
NEUTROPHILS # BLD AUTO: 2.99 K/UL
NEUTROPHILS NFR BLD AUTO: 54.4 %
PLATELET # BLD AUTO: 205 K/UL
RBC # BLD: 3.53 M/UL
RBC # FLD: 13.5 %
WBC # FLD AUTO: 5.49 K/UL

## 2019-07-03 ENCOUNTER — APPOINTMENT (OUTPATIENT)
Dept: GASTROENTEROLOGY | Facility: CLINIC | Age: 54
End: 2019-07-03
Payer: MEDICAID

## 2019-07-03 VITALS
HEIGHT: 58 IN | BODY MASS INDEX: 26.87 KG/M2 | SYSTOLIC BLOOD PRESSURE: 124 MMHG | HEART RATE: 56 BPM | DIASTOLIC BLOOD PRESSURE: 78 MMHG | RESPIRATION RATE: 15 BRPM | WEIGHT: 128 LBS | OXYGEN SATURATION: 97 %

## 2019-07-03 DIAGNOSIS — K83.8 OTHER SPECIFIED DISEASES OF BILIARY TRACT: ICD-10-CM

## 2019-07-03 LAB
ALBUMIN SERPL ELPH-MCNC: 4.2 G/DL
ANION GAP SERPL CALC-SCNC: 12 MMOL/L
BUN SERPL-MCNC: 39 MG/DL
CALCIUM SERPL-MCNC: 9.3 MG/DL
CHLORIDE SERPL-SCNC: 107 MMOL/L
CO2 SERPL-SCNC: 23 MMOL/L
CREAT SERPL-MCNC: 1.33 MG/DL
GLUCOSE SERPL-MCNC: 90 MG/DL
PHOSPHATE SERPL-MCNC: 5 MG/DL
POTASSIUM SERPL-SCNC: 4.9 MMOL/L
SODIUM SERPL-SCNC: 142 MMOL/L

## 2019-07-03 PROCEDURE — 99204 OFFICE O/P NEW MOD 45 MIN: CPT

## 2019-07-03 NOTE — ASSESSMENT
[FreeTextEntry1] : Patient with a very dilated CBD. Ideally would like to evaluate with MRCP but this may not be possible with her renal issues. I have emailed her nephrologist asking for guidance on this. \par \par Alternatively I can do a EUS +/- ERCP. I have planned for EUS+/- ERCP for 8/2/19.\par \par \par I discussed the proposed EUS/ERCP with the patient and son. We I discussed the risks (bleeding, infection, perforation, pancreatitis, and anesthesia risks), benefits, and alternatives  with the patient. They agree to proceed. \par \par Thank you for involving me in their care.\par \par Mook Burnett MD\par Director of Endoscopy\par [facility]\par [facility]\par Phone: [phone]\par Fax [phone]\par

## 2019-07-03 NOTE — PHYSICAL EXAM
[General Appearance - Alert] : alert [General Appearance - In No Acute Distress] : in no acute distress [Sclera] : the sclera and conjunctiva were normal [PERRL With Normal Accommodation] : pupils were equal in size, round, and reactive to light [Respiration, Rhythm And Depth] : normal respiratory rhythm and effort [] : no respiratory distress [Apical Impulse] : the apical impulse was normal [Heart Rate And Rhythm] : heart rate was normal and rhythm regular [Bowel Sounds] : normal bowel sounds [Abdomen Soft] : soft [Cervical Lymph Nodes Enlarged Posterior Bilaterally] : posterior cervical

## 2019-07-03 NOTE — HISTORY OF PRESENT ILLNESS
[Nausea] : denies nausea [Vomiting] : denies vomiting [Heartburn] : denies heartburn [Constipation] : denies constipation [Diarrhea] : denies diarrhea [Abdominal Pain] : denies abdominal pain [Yellow Skin Or Eyes (Jaundice)] : denies jaundice [de-identified] : 53 year old female with IgA nephropathy who was incidentally found to have a very dilated bile duct to 17 mm on CT scan. \par \par Patient has no jaundice. Her bilirubin is normal but AST/ALT are elevated to 165/197. \par \par No RUQ pain. \par \par No GI cancers in the family. \par \par Pts BMs are OK

## 2019-07-31 ENCOUNTER — CHART COPY (OUTPATIENT)
Age: 54
End: 2019-07-31

## 2019-07-31 ENCOUNTER — APPOINTMENT (OUTPATIENT)
Dept: NEPHROLOGY | Facility: CLINIC | Age: 54
End: 2019-07-31
Payer: MEDICAID

## 2019-07-31 VITALS
WEIGHT: 130.07 LBS | SYSTOLIC BLOOD PRESSURE: 149 MMHG | BODY MASS INDEX: 27.3 KG/M2 | OXYGEN SATURATION: 99 % | DIASTOLIC BLOOD PRESSURE: 84 MMHG | HEIGHT: 58 IN | HEART RATE: 46 BPM

## 2019-07-31 PROCEDURE — 99204 OFFICE O/P NEW MOD 45 MIN: CPT | Mod: GC

## 2019-07-31 NOTE — PHYSICAL EXAM
[General Appearance - Alert] : alert [Sclera] : the sclera and conjunctiva were normal [PERRL With Normal Accommodation] : pupils were equal in size, round, and reactive to light [General Appearance - In No Acute Distress] : in no acute distress [Extraocular Movements] : extraocular movements were intact [Outer Ear] : the ears and nose were normal in appearance [Oropharynx] : the oropharynx was normal [Auscultation Breath Sounds / Voice Sounds] : lungs were clear to auscultation bilaterally [Heart Rate And Rhythm] : heart rate was normal and rhythm regular [Heart Sounds] : normal S1 and S2 [Heart Sounds Gallop] : no gallops [Heart Sounds Pericardial Friction Rub] : no pericardial rub [Murmurs] : no murmurs [Edema] : there was no peripheral edema [Bowel Sounds] : normal bowel sounds [Abdomen Soft] : soft [Abdomen Tenderness] : non-tender [] : no hepato-splenomegaly [Abdomen Mass (___ Cm)] : no abdominal mass palpated [Abnormal Walk] : normal gait [Skin Color & Pigmentation] : normal skin color and pigmentation [Cranial Nerves] : cranial nerves 2-12 were intact [Oriented To Time, Place, And Person] : oriented to person, place, and time [FreeTextEntry1] : bx site noted on left flank- some old scar- pain at the site to the top left, otherwise no other tenderness noted

## 2019-07-31 NOTE — END OF VISIT
[] : Fellow [FreeTextEntry3] : CKD stable\par No changes from renal standpoint, ok to proceed with MRCP if needed as mike risk is minimal in the newer agents used\par f/u 3 months

## 2019-07-31 NOTE — ASSESSMENT
[FreeTextEntry1] : Ms. CHASE is a 53 year old female with biopsy proveng IgA nephropathy\par \par IgA neph- ckd Stage 3:- Pt managed with Losartan 50 mg daily, last prot/creat ratio improved to 0.4 g. Scr early july 1.3. GFR 46. \par From renal stand point pt can go for MRCP, no contra-indication. \par Will repeat labs in mid august to monitor hb, and renal panel. \par \par f/u in 3 months \par \par

## 2019-07-31 NOTE — HISTORY OF PRESENT ILLNESS
[FreeTextEntry1] : Ms. 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 She was in usual state of health with just hx of hyperlipidemia saw Dr Cruz Godoy a year ago for mild non nephrotic range proteinuria and normal crt. She was started on a ACEI and monitored. She had serological workup and sonogram that were not revealing. She then progressed and her proteinuria went from 700 to 1500mg/ 24 hours. She then had a kidney bx done was non conclusive.  It was limited due to number of glomeruli but had ? immune complex deposition. EF showed 50% foot process effacement and no tissue for IF. Decision was made to repeat kidney biopsy again done by IR at [facility] complicated with hematoma 12-10-8 and then dc on 9g/dl. She had a repeat ct scan that showed no change in size on dc and stayed in hospital for 4 days. Bx showing IgA Neph with significant chronic changes and lot of scarring in the biopsy. Pt now managed with ARBs only. \par \par Since last visit pt claims pain has improved, no hematuria, foamy urine, BP stable, pt was evaluated by GI, after noticing pt had CBD dilated planning for EUS/MRCP.

## 2019-08-02 ENCOUNTER — APPOINTMENT (OUTPATIENT)
Dept: GASTROENTEROLOGY | Facility: HOSPITAL | Age: 54
End: 2019-08-02

## 2019-10-31 ENCOUNTER — APPOINTMENT (OUTPATIENT)
Dept: NEPHROLOGY | Facility: CLINIC | Age: 54
End: 2019-10-31

## 2019-11-02 ENCOUNTER — EMERGENCY (EMERGENCY)
Facility: HOSPITAL | Age: 54
LOS: 1 days | Discharge: HOME | End: 2019-11-02
Attending: EMERGENCY MEDICINE
Payer: MEDICAID

## 2019-11-02 VITALS
TEMPERATURE: 100 F | RESPIRATION RATE: 18 BRPM | OXYGEN SATURATION: 99 % | SYSTOLIC BLOOD PRESSURE: 171 MMHG | WEIGHT: 130.07 LBS | HEART RATE: 83 BPM | HEIGHT: 57 IN | DIASTOLIC BLOOD PRESSURE: 76 MMHG

## 2019-11-02 VITALS
RESPIRATION RATE: 16 BRPM | SYSTOLIC BLOOD PRESSURE: 161 MMHG | HEART RATE: 57 BPM | DIASTOLIC BLOOD PRESSURE: 86 MMHG | OXYGEN SATURATION: 96 % | TEMPERATURE: 98 F

## 2019-11-02 DIAGNOSIS — Z90.49 ACQUIRED ABSENCE OF OTHER SPECIFIED PARTS OF DIGESTIVE TRACT: Chronic | ICD-10-CM

## 2019-11-02 PROCEDURE — 93005 ELECTROCARDIOGRAM TRACING: CPT

## 2019-11-02 PROCEDURE — 99284 EMERGENCY DEPT VISIT MOD MDM: CPT

## 2019-11-02 PROCEDURE — 82962 GLUCOSE BLOOD TEST: CPT

## 2019-11-02 PROCEDURE — 93010 ELECTROCARDIOGRAM REPORT: CPT

## 2019-11-02 PROCEDURE — 99283 EMERGENCY DEPT VISIT LOW MDM: CPT

## 2019-11-02 RX ORDER — VALACYCLOVIR 500 MG/1
1 TABLET, FILM COATED ORAL
Qty: 21 | Refills: 0
Start: 2019-11-02 | End: 2019-11-08

## 2019-11-02 RX ORDER — VALACYCLOVIR 500 MG/1
1000 TABLET, FILM COATED ORAL ONCE
Refills: 0 | Status: COMPLETED | OUTPATIENT
Start: 2019-11-02 | End: 2019-11-02

## 2019-11-02 RX ADMIN — VALACYCLOVIR 1000 MILLIGRAM(S): 500 TABLET, FILM COATED ORAL at 22:01

## 2019-11-02 RX ADMIN — Medication 60 MILLIGRAM(S): at 22:02

## 2019-11-02 NOTE — ED PROVIDER NOTE - CRANIAL NERVE AND PUPILLARY EXAM
CRANIAL NERVES NOT INTACT/**ATTENDING ADDENDUM (Dr. Marvin Ross): palsy of the right seventh cranial nerve. NO involvement of CN 2,3,4,5,6,8-12 is obvious on exam./gag reflex intact/tongue is midline/central and peripheral vision intact/extra-ocular movements intact

## 2019-11-02 NOTE — ED PROVIDER NOTE - PATIENT PORTAL LINK FT
You can access the FollowMyHealth Patient Portal offered by [facility] by registering at the following website: [email or web address]. By joining [email or web address] FollowMyHealth portal, you will also be able to view your health information using other applications (apps) compatible with our system.

## 2019-11-02 NOTE — ED ADULT NURSE NOTE - CHIEF COMPLAINT QUOTE
felt right eye "twitching" yesterday. States today about 4 hours ago developed right sided facial droop. Denies slurred speech, HA, weakness. Family reports facial droop has improved.  Code Stroke called in triage at 1932

## 2019-11-02 NOTE — ED ADULT TRIAGE NOTE - CHIEF COMPLAINT QUOTE
felt right eye "twitching" yesterday. States today about 4 hours ago developed right sided facial droop. Denies slurred speech, HA, weakness. Family reports facial droop has improved. felt right eye "twitching" yesterday. States today about 4 hours ago developed right sided facial droop. Denies slurred speech, HA, weakness. Family reports facial droop has improved.  Code Stroke called in triage at 1932

## 2019-11-02 NOTE — ED ADULT NURSE NOTE - OBJECTIVE STATEMENT
55y/o female presented to the ED from home with complaint of right facial numbness. A&Ox3, ambulatory. PMH- HTN, HLD. Patient states that around 4PM she started to have right sided numbness, and tingling, then developed right sided facial droop. Patient also reports right eye twitching yesterday that resolved after a few hours. Family at bedside states that right sided facial droop has improved. Upon arrival right sided facial droop noted, no right sided eyebrow movement noted. Patient able to move all extremities 5/5 with no deficits noted. Pupils 3 reactive /bilaterally.    Code stroke called in triage at 1932, and cancelled by MD Ross upon assessment of patient

## 2019-11-02 NOTE — ED PROVIDER NOTE - RESPIRATORY, MLM
Breath sounds clear and equal bilaterally. **ATTENDING ADDENDUM (Dr. Marvin Ross): NO wheezing, rales, rhonchi, crackles, stridor, drooling, retractions, nasal flaring, or tripoding.

## 2019-11-02 NOTE — ED PROVIDER NOTE - PLAN OF CARE
**ATTENDING ADDENDUM (Dr. Marvin Ross): Goals of care include resolution of emergent/urgent symptoms and concerns, and restoration to baseline level of homeostasis.

## 2019-11-02 NOTE — ED PROVIDER NOTE - CHPI ED SYMPTOMS POS
**ATTENDING ADDENDUM (Dr. Marvin Ross): POSITIVE right-sided facial droop involving eyebrow and mid- and lower aspects of right side of the face.

## 2019-11-02 NOTE — ED PROVIDER NOTE - CLINICAL SUMMARY MEDICAL DECISION MAKING FREE TEXT BOX
53 yo F with pmhx of HTN, HLD, shingles, ?ckd presents with 8 hours of right sided facial droops. Unable to wrinkle rt side of forehead - no extremities neuro deficit, smooth gait, normal finger to nose, heel to shin, rapid movements. Exam is consistent with Bell's palsy. Will discharge patient on valacyclovir and prednisone with f/u with primary care physician 53 yo F with pmhx of HTN, HLD, shingles, ?ckd presents with 8 hours of right sided facial droops. Unable to wrinkle rt side of forehead - no extremities neuro deficit, smooth gait, normal finger to nose, heel to shin, rapid movements. Exam is consistent with Bell's palsy. Will discharge patient on valacyclovir and prednisone with f/u with primary care physician  **ATTENDING MEDICAL DECISION MAKING/SYNTHESIS (Dr. Marvin Ross): I have reviewed the Chief Complaint, the HPI, the ROS, and have directly performed and confirmed the findings on the Physical Examination. I have reviewed the medical decision making with all providers, as applicable. The PROBLEM REPRESENTATION at this time is: 54-year-old woman (Persian speaking, family provided translation services as patient/family did not request, need, or want additional translation services at time of initial evaluation) with history of hyperlipidemia with onset of right-sided facial droop involving the eyebrow, lips, and face for over eight hours duration. NO history of trauma. POSITIVE history of recent "cold". NO history of atrial fibrillation or flutter, carotid a. problems, seizure, cancer, mass/tumor, or intracerebral hemorrhage. NO history of cerebrovascular accident or transient ischemic attack. NO speech or visual changes (but right eye irritation). The MOST LIKELY DIAGNOSIS, and the LIST OF DIFFERENTIAL DIAGNOSES, includes (but is not limited to) the following: facial nerve palsy (likely), cerebrovascular accident, transient ischemic attack, vertebrobasilar insufficiency or equivalent, mass/tumor, serious bacterial infection or sepsis/severe sepsis e.g. meningococcemia, meningitis, encephalitis, or equivalent, dehydration, electrolyte-metabolic-endocrine derangements, vascular cause e.g. carotid dissection or equivalent, demyelinating disorder, other myelitis (NO evidence). The likelihood of each of these diagnoses has been appropriately considered in the context of this patient's presentation and my evaluation. PLAN: as described in EMR, including diagnostics, therapeutics and consultation as clinically warranted. I will continue to reevaluate the patient, including the results of all testing, and monitor response to therapy throughout the patient's course in the ED.

## 2019-11-02 NOTE — ED PROVIDER NOTE - CHPI ED SYMPTOMS NEG
no vomiting/no change in level of consciousness/no blurred vision/no confusion/no dizziness/**ATTENDING ADDENDUM (Dr. Marvin Ross): NO headache. NO chest pain, palpitations, irregular heartbeat, shortness of breath, dyspnea on exertion, visual changes, speech changes, syncope or near-syncope. NO neck or back pain. NO focal or generalized weakness of the bilateral upper or lower extremities. NO numbness, tingling, weakness, or paresthesias of the bilateral upper or lower extremities. NO acute urinary retention or incontinence./no loss of consciousness/no nausea/no fever/no numbness

## 2019-11-02 NOTE — ED ADULT NURSE REASSESSMENT NOTE - NS ED NURSE REASSESS COMMENT FT1
Patient A&Ox3, ambulatory. D/C instructions reviewed with patient and patients family members. Prescribed medication discussed with family member, verbalized understanding. IV removed by RN. VSS.

## 2019-11-02 NOTE — ED PROVIDER NOTE - GASTROINTESTINAL, MLM
Abdomen soft, non-tender **ATTENDING ADDENDUM (Dr. Marvin Ross): non-distended. NO guarding, rebound, or rigidity. NO pulsatile or non-pulsatile masses. NO hernias. NO obvious hepatosplenomegaly.

## 2019-11-02 NOTE — ED PROVIDER NOTE - OBJECTIVE STATEMENT
55 yo F with pmhx of HTN, HLD, shingles, ?ckd presents with 8 hours of right sided facial droops. States she started feeling a tingling her right eyelids yesterday. Had a cold 2 weeks ago that has resolved now. Denies difficulty swallowing, change in strength/sensation in her extremities, change in gait, chest pain, shortness of breath, nausea, vomiting, diarrhea, abdominal pain. Denies headache, dizziness, change in vision. Denies ear pain, change in her hearing, fever, chills. 55 yo F with pmhx of HTN, HLD, shingles, ?ckd presents with 8 hours of right sided facial droops. States she started feeling a tingling her right eyelids yesterday. Had a cold 2 weeks ago that has resolved now. Denies difficulty swallowing, change in strength/sensation in her extremities, change in gait, chest pain, shortness of breath, nausea, vomiting, diarrhea, abdominal pain. Denies headache, dizziness, change in vision. Denies ear pain, change in her hearing, fever, chills.  **ATTENDING ADDENDUM (Dr. Marvin Ross): I attest that I have directly and personally interviewed and examined this patient and elicited a comparable history of present illness and review of systems as documented, along with my EM resident. I attest that I have made significant contributions to the documentation where necessary and as noted in the EMR. Of note, and in addition to the above, a CODE STROKE ACTIVATION was initiated by Triage RN.

## 2019-11-02 NOTE — ED PROVIDER NOTE - MUSCULOSKELETAL, MLM
Spine appears normal, range of motion is not limited, no muscle or joint tenderness **ATTENDING ADDENDUM (Dr. Marvin Ross): NO focal or generalized weakness. Symmetrically equal strength, 5/5. NO numbness, tingling, weakness, or paresthesias.

## 2019-11-02 NOTE — ED PROVIDER NOTE - LAB INTERPRETATION
**ATTENDING ADDENDUM (Dr. Marvin Ross): Labs reviewed. Pertinent findings include: point-of-care glucose does not reveal hypoglycemia.

## 2019-11-02 NOTE — ED PROVIDER NOTE - CHIEF COMPLAINT
The patient is a 54y Female complaining of right-sided facial droop. The patient is a 54y Female complaining of subacute right-sided facial droop for 8+ hours with right eye irritation.

## 2019-11-02 NOTE — ED PROVIDER NOTE - PHYSICAL EXAMINATION
Gen: well developed and well nourished, NAD  Head: NC/NT  Eyes: PERRL, EOMI  ENT: airway patent, mmm, oral cavity and pharynx normal. No inflammation, swelling, exudate, or lesions. TM intact b/l without lesions in ear canal.   CV: RRR, +S1/S2, no M/R/G  Resp: CTAB, symmetric breath sounds, no W/R/R  GI: abdomen soft non-distended, NTTP  Back: no rashes  Neuro: A&Ox4, CN2-12 grossly intact (except weakness of facial muscles of right side of face - unable to wrinkle her right side of the forehead, sensation of face is intact b/l, able to chew gum on rt side, +RT facial droop), muscle strength +5/5 in UE and LE BL, gross sensation intact in UE and LE BL,  finger to nose smooth and rapid, normal rapid alternating movements, gait smooth and coordinated, negative Romberg's test, negative pronator drift  Psych: appropriate mood, normal insight Gen: well developed and well nourished, NAD  Head: NC/NT  Eyes: PERRL, EOMI  ENT: airway patent, mmm, oral cavity and pharynx normal. No inflammation, swelling, exudate, or lesions. TM intact b/l without lesions in ear canal.   CV: RRR, +S1/S2, no M/R/G  Resp: CTAB, symmetric breath sounds, no W/R/R  GI: abdomen soft non-distended, NTTP  Back: no rashes  Neuro: A&Ox4, CN2-12 grossly intact (except weakness of facial muscles of right side of face - unable to wrinkle her right side of the forehead, sensation of face is intact b/l, able to chew gum on rt side, +RT facial droop), muscle strength +5/5 in UE and LE BL, gross sensation intact in UE and LE BL,  finger to nose smooth and rapid, normal rapid alternating movements, gait smooth and coordinated, negative Romberg's test, negative pronator drift  Psych: appropriate mood, normal insight  **ATTENDING ADDENDUM (Dr. Marvin Ross): I have reviewed and substantially contributed to the elements of the PE as documented above. I have directly performed an examination of this patient in conjunction with the other members (EM resident/PA/NP) of the patient care team.

## 2019-11-02 NOTE — ED PROVIDER NOTE - ATTENDING CONTRIBUTION TO CARE
**ATTENDING ADDENDUM (Dr. Marvin Ross): I attest that I have directly examined this patient and reviewed and formulated the diagnostic and therapeutic management plan in collaboration with the EM resident. Please see MDM note and remainder of EMR for findings from CC, HPI, ROS, and PE. (ERROL Rendon)

## 2019-11-02 NOTE — ED PROVIDER NOTE - CARDIOVASCULAR NEGATIVE STATEMENT, MLM
no chest pain and no edema. **ATTENDING ADDENDUM (Dr. Marvin Ross): NO palpitations. NO syncope or near-syncope. POSITIVE history of hyperlipidemia.

## 2019-11-02 NOTE — ED PROVIDER NOTE - EYES, MLM
Clear bilaterally, pupils equal, round and reactive to light. **ATTENDING ADDENDUM (Dr. Marvin Ross): Extraocular muscle movements intact. Clear corneas bilaterally, pupils equal and round. NO nystagmus. POSITIVE slight right eye irritation but without discharge. POSITIVE pterygium.

## 2019-11-02 NOTE — ED PROVIDER NOTE - NS ED ROS FT
Gen: Denies fever, chills  CV: Denies chest pain,   Skin: Denies rash, erythema  Resp: Denies SOB, cough  GI: Denies nausea, vomiting, diarrhea  Msk: Denies  extremity pain  : Denies dysuria, increased frequency/urgency  Neuro: Denies LOC, weakness

## 2019-11-02 NOTE — ED PROVIDER NOTE - CONTEXT
**ATTENDING ADDENDUM (Dr. Marvin Ross): POSITIVE recent "cold" within the past two weeks. DENIES recent travel. NO sick contacts. NO history of trauma./known (describe)

## 2019-11-02 NOTE — ED PROVIDER NOTE - SKIN NEGATIVE STATEMENT, MLM
no abrasions, no jaundice, no lesions, no pruritis, and no rashes. **ATTENDING ADDENDUM (Dr. Marvin Ross): NO rashes, lesions, vesicles, cellulitis, petechiae, purpurae, track marks or ecchymoses.

## 2019-11-02 NOTE — ED PROVIDER NOTE - NSFOLLOWUPINSTRUCTIONS_ED_ALL_ED_FT
You were seen today for Bell's Palsy. Stroke ruled out based on physical exam. You are prescribed prednisone taper (please take as instructed on the prescription) and valacyclovir to take at home.  Please follow up with your primary care physician within 3-5 days. Please return if you start developing strength/sensation change in extremities, weakness, syncope, chest pain, shortness of breath, or any other concerning symptoms.

## 2019-11-02 NOTE — ED ADULT NURSE NOTE - NSIMPLEMENTINTERV_GEN_ALL_ED
Implemented All Universal Safety Interventions:  [place] to call system. Call bell, personal items and telephone within reach. Instruct patient to call for assistance. Room bathroom lighting operational. Non-slip footwear when patient is off stretcher. Physically safe environment: no spills, clutter or unnecessary equipment. Stretcher in lowest position, wheels locked, appropriate side rails in place.

## 2019-11-02 NOTE — ED PROVIDER NOTE - EYES [+], MLM
**ATTENDING ADDENDUM (Dr. Marvin Ross): POSITIVE slight right eye irritation. NO discharge or conjunctivitis.

## 2020-01-14 ENCOUNTER — APPOINTMENT (OUTPATIENT)
Dept: NEPHROLOGY | Facility: CLINIC | Age: 55
End: 2020-01-14
Payer: MEDICAID

## 2020-01-14 VITALS
SYSTOLIC BLOOD PRESSURE: 138 MMHG | DIASTOLIC BLOOD PRESSURE: 80 MMHG | BODY MASS INDEX: 29.39 KG/M2 | HEIGHT: 58 IN | WEIGHT: 140 LBS | HEART RATE: 57 BPM | OXYGEN SATURATION: 98 %

## 2020-01-14 PROCEDURE — 99213 OFFICE O/P EST LOW 20 MIN: CPT

## 2020-01-14 RX ORDER — CHROMIUM 200 MCG
TABLET ORAL
Refills: 0 | Status: DISCONTINUED | COMMUNITY
End: 2020-01-14

## 2020-01-14 NOTE — PHYSICAL EXAM
[General Appearance - Alert] : alert [General Appearance - In No Acute Distress] : in no acute distress [PERRL With Normal Accommodation] : pupils were equal in size, round, and reactive to light [Sclera] : the sclera and conjunctiva were normal [Extraocular Movements] : extraocular movements were intact [Outer Ear] : the ears and nose were normal in appearance [Oropharynx] : the oropharynx was normal [Auscultation Breath Sounds / Voice Sounds] : lungs were clear to auscultation bilaterally [Heart Rate And Rhythm] : heart rate was normal and rhythm regular [Heart Sounds] : normal S1 and S2 [Heart Sounds Gallop] : no gallops [Murmurs] : no murmurs [Heart Sounds Pericardial Friction Rub] : no pericardial rub [Bowel Sounds] : normal bowel sounds [Edema] : there was no peripheral edema [Abdomen Tenderness] : non-tender [Abdomen Soft] : soft [Abdomen Mass (___ Cm)] : no abdominal mass palpated [] : no hepato-splenomegaly [Abnormal Walk] : normal gait [FreeTextEntry1] : bx site noted on left flank- some old scar- pain at the site to the top left, otherwise no other tenderness noted [Skin Color & Pigmentation] : normal skin color and pigmentation [Cranial Nerves] : cranial nerves 2-12 were intact [Oriented To Time, Place, And Person] : oriented to person, place, and time

## 2020-01-14 NOTE — HISTORY OF PRESENT ILLNESS
[FreeTextEntry1] : Ms. 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 She was in usual state of health with just hx of hyperlipidemia saw Dr Cruz Godoy a year ago for mild non nephrotic range proteinuria and normal crt. She was started on a ACEI and monitored. She had serological workup and sonogram that were not revealing. She then progressed and her proteinuria went from 700 to 1500mg/ 24 hours. She then had a kidney bx done last month that was non conclusive.  It was limited due to number of glomeruli but had ? immune complex deposition. EF showed 50% foot proces effacement and no tissue for IF. \par Since last visit, had a repeat kidney bx done by IR at [facility] and following the kidney bx had a moderate hematoma and hgb drop from 12-10-8 and then dc on 9g/dl. She had a repeat ct scan that showed no change in size on dc and stayed in hospital for 4 days. She had Bx showing IgA Neph with significant chronic changes and lot of scarring in the biopsy. She is here with hiccups, cough and still having pain at the bx site. She is not having any hematuria and BP is controlled. She was dc on tylenol which she is taking as needed and losartan and statin. She has n/v on and off but slightly better. \par Since last visit, didn't get ERCP. She gained 10lbs and crt is 1.1 in Oct 2019. She feels well otherwise. \par No n/v. No decrease in appetite, no termors. No edema worsening. no decreased sleep. No foamy urine. no hematuria, no dysuria. no confusion. No SOB, WHITE. No wt loss.\par

## 2020-01-14 NOTE — ASSESSMENT
[FreeTextEntry1] : Ms. CHASE is a 54 year old female with recently dx of IgA nephropathy now here post kidney bx\par \par IgA neph- ckd Stage 3:- no treatment given chronic disease and based on STOP-IgA trial, no benefit of steroids in this case. Pt and daughter understand. BP control and avoiding nephrotoxic insults will help. Risk of progression is around 30% but could stay stable if bp is controlled and conservative management. \par IgA can be seen with Gi illness as a sec GN. asked her to re see her GI doc for the MRCP to rule out Gall bladder disease.\par \par HTN controlled\par \par CKD care and other tests- ordered\par \par \par \par

## 2020-01-16 LAB
ALBUMIN SERPL ELPH-MCNC: 4.2 G/DL
ANION GAP SERPL CALC-SCNC: 13 MMOL/L
APPEARANCE: CLEAR
BACTERIA: NEGATIVE
BASOPHILS # BLD AUTO: 0.03 K/UL
BASOPHILS NFR BLD AUTO: 0.6 %
BILIRUBIN URINE: NEGATIVE
BLOOD URINE: ABNORMAL
BUN SERPL-MCNC: 38 MG/DL
CALCIUM SERPL-MCNC: 9.6 MG/DL
CALCIUM SERPL-MCNC: 9.6 MG/DL
CHLORIDE SERPL-SCNC: 103 MMOL/L
CO2 SERPL-SCNC: 22 MMOL/L
COLOR: COLORLESS
CREAT SERPL-MCNC: 1.15 MG/DL
CREAT SPEC-SCNC: 23 MG/DL
CREAT/PROT UR: 2.2 RATIO
EOSINOPHIL # BLD AUTO: 0.5 K/UL
EOSINOPHIL NFR BLD AUTO: 10 %
ESTIMATED AVERAGE GLUCOSE: 111 MG/DL
GLUCOSE QUALITATIVE U: NEGATIVE
GLUCOSE SERPL-MCNC: 94 MG/DL
HBA1C MFR BLD HPLC: 5.5 %
HCT VFR BLD CALC: 39.4 %
HGB BLD-MCNC: 12.3 G/DL
HYALINE CASTS: 1 /LPF
IMM GRANULOCYTES NFR BLD AUTO: 0.2 %
KETONES URINE: NEGATIVE
LEUKOCYTE ESTERASE URINE: NEGATIVE
LYMPHOCYTES # BLD AUTO: 1.94 K/UL
LYMPHOCYTES NFR BLD AUTO: 38.6 %
MAGNESIUM SERPL-MCNC: 2 MG/DL
MAN DIFF?: NORMAL
MCHC RBC-ENTMCNC: 30.4 PG
MCHC RBC-ENTMCNC: 31.2 GM/DL
MCV RBC AUTO: 97.3 FL
MICROSCOPIC-UA: NORMAL
MONOCYTES # BLD AUTO: 0.43 K/UL
MONOCYTES NFR BLD AUTO: 8.6 %
NEUTROPHILS # BLD AUTO: 2.11 K/UL
NEUTROPHILS NFR BLD AUTO: 42 %
NITRITE URINE: NEGATIVE
PARATHYROID HORMONE INTACT: 39 PG/ML
PH URINE: 6
PHOSPHATE SERPL-MCNC: 3.8 MG/DL
PLATELET # BLD AUTO: 219 K/UL
POTASSIUM SERPL-SCNC: 4.5 MMOL/L
PROT UR-MCNC: 51 MG/DL
PROTEIN URINE: ABNORMAL
RBC # BLD: 4.05 M/UL
RBC # FLD: 12.3 %
RED BLOOD CELLS URINE: 0 /HPF
SODIUM SERPL-SCNC: 138 MMOL/L
SPECIFIC GRAVITY URINE: 1.01
SQUAMOUS EPITHELIAL CELLS: 1 /HPF
UROBILINOGEN URINE: NORMAL
WBC # FLD AUTO: 5.02 K/UL
WHITE BLOOD CELLS URINE: 1 /HPF

## 2020-04-01 PROCEDURE — G9005: CPT

## 2020-07-07 ENCOUNTER — APPOINTMENT (OUTPATIENT)
Dept: NEPHROLOGY | Facility: CLINIC | Age: 55
End: 2020-07-07

## 2020-09-03 ENCOUNTER — APPOINTMENT (OUTPATIENT)
Dept: NEPHROLOGY | Facility: CLINIC | Age: 55
End: 2020-09-03
Payer: MEDICAID

## 2020-09-03 VITALS
OXYGEN SATURATION: 98 % | DIASTOLIC BLOOD PRESSURE: 85 MMHG | HEART RATE: 60 BPM | SYSTOLIC BLOOD PRESSURE: 134 MMHG | WEIGHT: 138 LBS | BODY MASS INDEX: 28.97 KG/M2 | HEIGHT: 58 IN

## 2020-09-03 PROCEDURE — 99214 OFFICE O/P EST MOD 30 MIN: CPT | Mod: GC

## 2020-09-03 NOTE — HISTORY OF PRESENT ILLNESS
[FreeTextEntry1] : Ms. 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 She was in usual state of health with just hx of hyperlipidemia saw Dr Cruz Godoy a year ago for mild non nephrotic range proteinuria and normal crt. She was started on a ACEI and monitored. She had serological workup and sonogram that were not revealing. She then progressed and her proteinuria went from 700 to 1500mg/ 24 hours. She then had a kidney bx done last month that was non conclusive.  It was limited due to number of glomeruli but had ? immune complex deposition. EF showed 50% foot process effacement and no tissue for IF. \par \par She had a repeat kidney bx done by IR at [facility] and following the kidney bx had a moderate hematoma and hgb drop from 12-10-8 and then dc on 9g/dl. She had a repeat ct scan that showed no change in size on dc and stayed in hospital for 4 days. She had Bx showing IgA Neph with significant chronic changes and lot of scarring in the biopsy. \par \par Today she is feeling well, has no acute complaints. Denies fever, cough, SOB, chest pain, edema of LE, hematuria, dysuria. N/V-  No wt loss.\par

## 2020-09-03 NOTE — DISCHARGE NOTE PROVIDER - NSDCFUADDAPPT_GEN_ALL_CORE_FT
Refer to HPI Follow up with Dr. Palma for biospy results.  Follow up with Dr. Carmona as an outpatient.   Follow up with Gastroenterologist for Common bile duct dilation.

## 2020-09-03 NOTE — ASSESSMENT
[FreeTextEntry1] : 54 year old female with IgA nephropathy - presenting today for follow up\par \par \par # IgA nephropathy and CKD stage 3\par Kidney function is stable, asymptomatic today- currently on Losartan - given chronic disease and based on STOP-IgA trial, no benefit of steroids in this case. last Prot/Cr ratio 2.2 01/2020 - sCr 1.15 (01/2020) improved from 1.3 on 2019\par \par BP controlled. Avoid Nephrotoxic drugs, continue conservative management. \par will recheck prot/Cr, renal panel and rest of CKD labs  \par \par HTN \par controlled, on Losartan \par low salt diet \par

## 2020-09-03 NOTE — PHYSICAL EXAM
[General Appearance - Alert] : alert [General Appearance - In No Acute Distress] : in no acute distress [Sclera] : the sclera and conjunctiva were normal [PERRL With Normal Accommodation] : pupils were equal in size, round, and reactive to light [Extraocular Movements] : extraocular movements were intact [Outer Ear] : the ears and nose were normal in appearance [Oropharynx] : the oropharynx was normal [Auscultation Breath Sounds / Voice Sounds] : lungs were clear to auscultation bilaterally [Heart Sounds] : normal S1 and S2 [Heart Rate And Rhythm] : heart rate was normal and rhythm regular [Murmurs] : no murmurs [Heart Sounds Gallop] : no gallops [Heart Sounds Pericardial Friction Rub] : no pericardial rub [Edema] : there was no peripheral edema [Bowel Sounds] : normal bowel sounds [Abdomen Soft] : soft [] : no hepato-splenomegaly [Abdomen Tenderness] : non-tender [Abdomen Mass (___ Cm)] : no abdominal mass palpated [Skin Color & Pigmentation] : normal skin color and pigmentation [Abnormal Walk] : normal gait [Cranial Nerves] : cranial nerves 2-12 were intact [Oriented To Time, Place, And Person] : oriented to person, place, and time [No CVA Tenderness] : no ~M costovertebral angle tenderness [No Spinal Tenderness] : no spinal tenderness

## 2020-09-04 LAB
ALBUMIN SERPL ELPH-MCNC: 4.6 G/DL
ANION GAP SERPL CALC-SCNC: 13 MMOL/L
APPEARANCE: CLEAR
BACTERIA: NEGATIVE
BILIRUBIN URINE: NEGATIVE
BLOOD URINE: NORMAL
BUN SERPL-MCNC: 33 MG/DL
CALCIUM SERPL-MCNC: 9.7 MG/DL
CHLORIDE SERPL-SCNC: 102 MMOL/L
CHOLEST SERPL-MCNC: 216 MG/DL
CHOLEST/HDLC SERPL: 2.4 RATIO
CO2 SERPL-SCNC: 26 MMOL/L
COLOR: COLORLESS
CREAT SERPL-MCNC: 1.33 MG/DL
CREAT SPEC-SCNC: 13 MG/DL
CREAT/PROT UR: 3 RATIO
GLUCOSE QUALITATIVE U: NEGATIVE
GLUCOSE SERPL-MCNC: 105 MG/DL
HDLC SERPL-MCNC: 88 MG/DL
HYALINE CASTS: 0 /LPF
KETONES URINE: NEGATIVE
LDLC SERPL CALC-MCNC: 103 MG/DL
LEUKOCYTE ESTERASE URINE: NEGATIVE
MICROSCOPIC-UA: NORMAL
NITRITE URINE: NEGATIVE
PH URINE: 6.5
PHOSPHATE SERPL-MCNC: 3.5 MG/DL
POTASSIUM SERPL-SCNC: 4.3 MMOL/L
PROT UR-MCNC: 39 MG/DL
PROTEIN URINE: NORMAL
RED BLOOD CELLS URINE: 0 /HPF
SARS-COV-2 IGG SERPL IA-ACNC: 78.2 INDEX
SARS-COV-2 IGG SERPL QL IA: POSITIVE
SODIUM SERPL-SCNC: 140 MMOL/L
SPECIFIC GRAVITY URINE: 1
SQUAMOUS EPITHELIAL CELLS: 1 /HPF
TRIGL SERPL-MCNC: 122 MG/DL
UROBILINOGEN URINE: NORMAL
WHITE BLOOD CELLS URINE: 1 /HPF

## 2021-10-06 NOTE — ED PROVIDER NOTE - QUALITY
anxiety producing Island Pedicle Flap With Canthal Suspension Text: The defect edges were debeveled with a #15 scalpel blade.  Given the location of the defect, shape of the defect and the proximity to free margins an island pedicle advancement flap was deemed most appropriate.  Using a sterile surgical marker, an appropriate advancement flap was drawn incorporating the defect, outlining the appropriate donor tissue and placing the expected incisions within the relaxed skin tension lines where possible. The area thus outlined was incised deep to adipose tissue with a #15 scalpel blade.  The skin margins were undermined to an appropriate distance in all directions around the primary defect and laterally outward around the island pedicle utilizing iris scissors.  There was minimal undermining beneath the pedicle flap. A suspension suture was placed in the canthal tendon to prevent tension and prevent ectropion.

## 2022-01-15 NOTE — ED PROVIDER NOTE - CARE PLAN
[FreeTextEntry1] : physical examination  [de-identified] : MONTEZ CHOPRA is a 26 year old male with no significant PMHx who presents today for physical examination. Pt is requesting T-dap vaccine. Pt states that he had COVID-19 virus infection a month ago. He is feeling okay now and offers no specific complaints.  Principal Discharge DX:	Facial nerve palsy  Goal:	**ATTENDING ADDENDUM (Dr. Marvin Ross): Goals of care include resolution of emergent/urgent symptoms and concerns, and restoration to baseline level of homeostasis.

## 2022-11-08 NOTE — PROGRESS NOTE ADULT - PROBLEM SELECTOR PLAN 1
INFECTIOUS DISEASES CONSULT NOTE    Patient:  Anneliese Stearns 47 y.o. female  ROOM # [unfilled]  YOB: 1970  MRN: [id number]  CSN:  [id number]  Admit date: 11/2/2022   Admitting Physician: Alireza Kumar MD  Primary Care Physician: Fahad Johnson MD  REFERRING PROVIDER: No ref. provider found    Reason for Consultation: Wound culture    History of Present Illness/Chief Complaint: Pleasant 79-year-old woman. She has a history of multiple sclerosis. She indicates she developed a small sore dorsal aspect of right foot. The lesion has been present since July. She has been followed at wound care. She had had a culture that grew 4 different bacteria. She has been receiving IV antibiotic treatment. Infectious disease asked to evaluate and offer recommendations. Current Scheduled Medications:    meropenem  1,000 mg IntraVENous Q8H    tiZANidine  12 mg Oral BID    ampicillin-sulbactam  3,000 mg IntraVENous Q6H    Acetic Acid  250 mL Topical BID    sodium chloride flush  5-40 mL IntraVENous 2 times per day    enoxaparin  40 mg SubCUTAneous Daily    cetirizine  10 mg Oral Daily    DULoxetine  30 mg Oral Nightly    levETIRAcetam  750 mg Oral Daily    therapeutic multivitamin-minerals  1 tablet Oral Daily    pregabalin  300 mg Oral BID    pilocarpine  7.5 mg Oral BID    nicotine  1 patch TransDERmal Daily     Current PRN Medications:  acetaminophen, sodium chloride flush, sodium chloride, ondansetron **OR** ondansetron, polyethylene glycol, docusate sodium, HYDROcodone-acetaminophen, naloxone, albuterol    Allergies: Allergies   Allergen Reactions    Darvocet [Propoxyphene N-Acetaminophen]      Talking out of her head    Morphine      Category: Allergy;     Morphine And Related Itching and Swelling    Propoxyphene Swelling     Past Medical History: Multiple sclerosis. She indicates she has been nonambulatory for 20 years. Previous neuropathic ulcer left foot. Seizure. Back pain.   Degenerative arthritis. Colostomy. Past Surgical History: Baclofen pump insertion. Breast biopsy. Femur fracture surgery. Left transmetatarsal amputation. Abdominal surgery. Ljqfza-e-Xfis placement. Tonsillectomy. Ureterotomy. Social History: Previous history of tobacco use. No alcohol use. No illicit drug use. Family History: Breast cancer. Diabetes. Hypertension. Liver cancer. Exposure History: No close contacts have been ill    Review of Systems: No cough or shortness of breath  No nausea or vomiting  No abdominal pain or diarrhea    Vital Signs:  /60   Pulse 68   Temp 98.2 °F (36.8 °C) (Oral)   Resp 16   Ht 5' 9\" (1.753 m)   Wt 126 lb 6.4 oz (57.3 kg)   SpO2 98%   BMI 18.67 kg/m²  Temp (24hrs), Av °F (36.7 °C), Min:97.5 °F (36.4 °C), Max:98.2 °F (36.8 °C)    Physical Exam:   Vital signs reviewed. Sclera without crackles  Ostomy in place-pink and functioning  Baclofen pump without erythema or induration  Heart without murmur  Dvnzdl-x-Ibaa site without signs of infection     Picture of right dorsal foot wound taken 2022:    EXTR right dorsal foot wound October 3, 2022:      Lab Results:  CBC:   Recent Labs     228 22  0640   WBC 3.2* 3.0*   HGB 11.3* 11.6*   HCT 35.3* 36.1*    151     CMP:   Recent Labs     228 22  0640    146*   K 3.6 3.4*    109   CO2 28 30*   BUN 7 8   CREATININE 0.3* 0.2*   CALCIUM 8.2* 8.7   BILITOT  --  <0.2   ALKPHOS  --  96   ALT  --  24   AST  --  23   GLUCOSE 134* 90     Culture:   Blood cultures 2022-no growth  Blood cultures 2022-no growth    Culture wound right ankle from 2022:  Pseudomonas aeruginosa, Proteus mirabilis, Enterococcus faecalis, E.  Coli  Pseudomonas aeruginosa (2)    Antibiotic Interpretation Microscan  Method Status    cefepime Sensitive 2 mcg/mL BACTERIAL SUSCEPTIBILITY PANEL BY AYLA     gentamicin Sensitive 4 mcg/mL BACTERIAL SUSCEPTIBILITY PANEL BY AYLA     levofloxacin Resistant >=8 mcg/mL BACTERIAL SUSCEPTIBILITY PANEL BY AYLA     meropenem Sensitive <=0.25 mcg/mL BACTERIAL SUSCEPTIBILITY PANEL BY AYLA     piperacillin-tazobactam Sensitive <=4 mcg/mL BACTERIAL SUSCEPTIBILITY PANEL BY AYLA     tobramycin Sensitive 4 mcg/mL BACTERIAL SUSCEPTIBILITY PANEL BY AYLA       Proteus mirabilis (3)    Antibiotic Interpretation Microscan  Method Status    ampicillin Resistant >=32 mcg/mL BACTERIAL SUSCEPTIBILITY PANEL BY AYLA     ampicillin-sulbactam Resistant >=32 mcg/mL BACTERIAL SUSCEPTIBILITY PANEL BY AYLA     aztreonam Sensitive <=1 mcg/mL BACTERIAL SUSCEPTIBILITY PANEL BY AYLA     ceFAZolin Sensitive <=4 mcg/mL BACTERIAL SUSCEPTIBILITY PANEL BY AYLA     cefepime Sensitive <=1 mcg/mL BACTERIAL SUSCEPTIBILITY PANEL BY AYLA     cefTRIAXone Sensitive <=1 mcg/mL BACTERIAL SUSCEPTIBILITY PANEL BY AYLA     ertapenem Sensitive <=0.5 mcg/mL BACTERIAL SUSCEPTIBILITY PANEL BY AYLA     gentamicin Resistant >=16 mcg/mL BACTERIAL SUSCEPTIBILITY PANEL BY AYLA     levofloxacin Resistant >=8 mcg/mL BACTERIAL SUSCEPTIBILITY PANEL BY AYLA     meropenem Sensitive <=0.25 mcg/mL BACTERIAL SUSCEPTIBILITY PANEL BY AYLA     tobramycin Resistant >=16 mcg/mL BACTERIAL SUSCEPTIBILITY PANEL BY AYLA     trimethoprim-sulfamethoxazole Resistant >=320 mcg/mL BACTERIAL SUSCEPTIBILITY PANEL BY AYLA       Enterococcus faecalis (4)    Antibiotic Interpretation Microscan  Method Status    ampicillin Sensitive <=2 mcg/mL BACTERIAL SUSCEPTIBILITY PANEL BY AYLA     benzylpenicillin Sensitive 2 mcg/mL BACTERIAL SUSCEPTIBILITY PANEL BY AYLA     gentamicin-syn Sensitive SYN-S mcg/mL BACTERIAL SUSCEPTIBILITY PANEL BY AYLA     vancomycin Sensitive 2 mcg/mL BACTERIAL SUSCEPTIBILITY PANEL BY AYLA       Escherichia coli (1)    Antibiotic Interpretation Microscan  Method Status    ampicillin Sensitive <=2 mcg/mL BACTERIAL SUSCEPTIBILITY PANEL BY AYLA     ampicillin-sulbactam Sensitive <=2 mcg/mL BACTERIAL SUSCEPTIBILITY PANEL BY AYLA     aztreonam Sensitive <=1 mcg/mL BACTERIAL SUSCEPTIBILITY PANEL BY AYLA     ceFAZolin Sensitive <=4 mcg/mL BACTERIAL SUSCEPTIBILITY PANEL BY AYLA     cefepime Sensitive <=1 mcg/mL BACTERIAL SUSCEPTIBILITY PANEL BY AYLA     cefTRIAXone Sensitive <=1 mcg/mL BACTERIAL SUSCEPTIBILITY PANEL BY AYLA     ertapenem Sensitive <=0.5 mcg/mL BACTERIAL SUSCEPTIBILITY PANEL BY AYLA     gentamicin Sensitive <=1 mcg/mL BACTERIAL SUSCEPTIBILITY PANEL BY AYLA     levofloxacin Sensitive <=0.12 mcg/mL BACTERIAL SUSCEPTIBILITY PANEL BY AYLA     meropenem Sensitive <=0.25 mcg/mL BACTERIAL SUSCEPTIBILITY PANEL BY AYLA     piperacillin-tazobactam Sensitive <=4 mcg/mL BACTERIAL SUSCEPTIBILITY PANEL BY AYLA     trimethoprim-sulfamethoxazole Sensitive <=20 mcg/mL BACTERIAL SUSCEPTIBILITY PANEL BY AYLA       Radiology:   Right ankle wound:    Impression   Diffuse osteopenia. No acute osseous abnormality or erosive lesions. Low sensitivity of plain radiography for early osteomyelitis. Could correlate with MR if clinically warranted and not contraindicated. Recommendation:    Follow up as clinically indicated. Note: Direct correlation with point tenderness and the X-ray images is recommended and does significantly increase the sensitivity of radiographic evaluation. Electronically Signed by Luz Marina Del Castillo MD at 2022 09:40:50 AM EST              Additional Studies Reviewed:     Impression:   1. Chronic wound present dorsal aspect of right foot since around July-there is an ulcerated area in the center. There is some raised margins at the edge. Feel it should be biopsied to look for cancer or UpDox fungal/mycobacterial infection. 2.  Multiple sclerosis    Recommendations:    Continue treatment with Merrem  Discontinue Unasyn  Continue local care  Would suggest biopsying the right dorsal foot lesion and would send for the followin.   Send biopsy sample to pathology for histopathology with tissue Gram and GMS stain  2.   Send biopsy to you for aerobic/anaerobic culture, fungal culture, and AFB culture    Faye Rosenthal MD  11/07/22  7:19 PM with flank pain. S/p renal Bx of 5/8/19. Hgb dropped from 12 range to 10, now 8.5. - Needs repeat renal sono stat  - Will need IR consult if active bleeding.   - Once IR consulted, please have them speak to Jorge at [phone].  - Monitor Hgb q8h  - Pain control.

## 2022-12-21 NOTE — ED ADULT TRIAGE NOTE - SOURCE OF INFORMATION
Patient PAST SURGICAL HISTORY:  History of surgery gum surgery    [place] teeth extracted

## 2023-01-18 NOTE — PATIENT PROFILE ADULT - NSASFALLNEEDSASSISTWITH_GEN_A_NUR
Noted, and I'll be happy to accommodate her in my schedule within 3-4 weeks but if they specifically need a visit within 1 week, I can't offer anything   Please check with them     If this time line works for them, can see Feb 2 at 12:30 pm, otherwise should schedule with another provider   Thanks    standing/toileting/walking

## 2023-07-06 ENCOUNTER — APPOINTMENT (OUTPATIENT)
Dept: NEPHROLOGY | Facility: CLINIC | Age: 58
End: 2023-07-06
Payer: MEDICAID

## 2023-07-06 VITALS
HEIGHT: 58 IN | HEART RATE: 65 BPM | BODY MASS INDEX: 29.15 KG/M2 | SYSTOLIC BLOOD PRESSURE: 157 MMHG | TEMPERATURE: 97.2 F | OXYGEN SATURATION: 99 % | WEIGHT: 138.89 LBS | RESPIRATION RATE: 12 BRPM | DIASTOLIC BLOOD PRESSURE: 83 MMHG

## 2023-07-06 PROCEDURE — 99214 OFFICE O/P EST MOD 30 MIN: CPT

## 2023-07-06 RX ORDER — LOSARTAN POTASSIUM 25 MG/1
25 TABLET, FILM COATED ORAL DAILY
Qty: 30 | Refills: 5 | Status: DISCONTINUED | COMMUNITY
Start: 2020-09-04 | End: 2023-07-06

## 2023-07-06 NOTE — ASSESSMENT
[FreeTextEntry1] : 57 year old female with IgA nephropathy - presenting today for follow up after 3 year pandemic haitus\par \par \par # IgA nephropathy and CKD stage 3\par At this point only on losartan and unclear what her crt and proteinuria are\par may need SGLT2i vs sparsanten\par She may also qualify for APRILi study as well., depending on results of GFR\par BP controlled. Avoid Nephrotoxic drugs, continue conservative management. \par \par #HTN \par controlled, on Losartan \par low salt diet \par \par f/iu 6 months

## 2023-07-06 NOTE — PHYSICAL EXAM
[General Appearance - Alert] : alert [General Appearance - In No Acute Distress] : in no acute distress [Sclera] : the sclera and conjunctiva were normal [PERRL With Normal Accommodation] : pupils were equal in size, round, and reactive to light [Extraocular Movements] : extraocular movements were intact [Outer Ear] : the ears and nose were normal in appearance [Oropharynx] : the oropharynx was normal [Auscultation Breath Sounds / Voice Sounds] : lungs were clear to auscultation bilaterally [Heart Rate And Rhythm] : heart rate was normal and rhythm regular [Heart Sounds] : normal S1 and S2 [Heart Sounds Gallop] : no gallops [Murmurs] : no murmurs [Heart Sounds Pericardial Friction Rub] : no pericardial rub [Edema] : there was no peripheral edema [Bowel Sounds] : normal bowel sounds [Abdomen Soft] : soft [Abdomen Tenderness] : non-tender [] : no hepato-splenomegaly [Abdomen Mass (___ Cm)] : no abdominal mass palpated [No CVA Tenderness] : no ~M costovertebral angle tenderness [No Spinal Tenderness] : no spinal tenderness [Abnormal Walk] : normal gait [Skin Color & Pigmentation] : normal skin color and pigmentation [Cranial Nerves] : cranial nerves 2-12 were intact [Oriented To Time, Place, And Person] : oriented to person, place, and time

## 2023-07-06 NOTE — HISTORY OF PRESENT ILLNESS
[FreeTextEntry1] : Ms. CHASE is a 57 year old female with IgAN here for f.u after 3 year haitus. She was in usual state of health with just hx of hyperlipidemia saw Dr Cruz Godoy in 2019 for mild non nephrotic range proteinuria and normal crt. She was started on a ACEI and monitored. She had serological workup and sonogram that were not revealing. She then progressed and her proteinuria went from 700 to 1500mg/ 24 hours. She then had a kidney bx done last month that was non conclusive.  It was limited due to number of glomeruli but had ? immune complex deposition. EF showed 50% foot process effacement and no tissue for IF. \par \par She had a repeat kidney bx done by IR at [facility] in 2020 and following the kidney bx had a moderate hematoma and hgb drop from 12-10-8 and then dc on 9g/dl. She had a repeat ct scan that showed no change in size on dc and stayed in hospital for 4 days. She had Bx showing IgA Neph with significant chronic changes and lot of scarring in the biopsy. \par \par Since 2020, was placed on losartan and she has not seen me since then. Sees PMD and bp under control. No new meds. Today she is feeling well, has no acute complaints. Denies fever, cough, SOB, chest pain, edema of LE, hematuria, dysuria. N/V-  No wt loss.\par \par  services used

## 2023-07-07 LAB
ALBUMIN SERPL ELPH-MCNC: 3.8 G/DL
ANION GAP SERPL CALC-SCNC: 14 MMOL/L
APPEARANCE: CLEAR
BACTERIA: NEGATIVE /HPF
BILIRUBIN URINE: NEGATIVE
BLOOD URINE: ABNORMAL
BUN SERPL-MCNC: 53 MG/DL
CALCIUM SERPL-MCNC: 8.9 MG/DL
CAST: 2 /LPF
CHLORIDE SERPL-SCNC: 106 MMOL/L
CO2 SERPL-SCNC: 19 MMOL/L
COLOR: YELLOW
CREAT SERPL-MCNC: 2.6 MG/DL
CREAT SPEC-SCNC: 21 MG/DL
CREAT/PROT UR: 6.1 RATIO
EGFR: 21 ML/MIN/1.73M2
EPITHELIAL CELLS: 0 /HPF
GLUCOSE QUALITATIVE U: NEGATIVE MG/DL
GLUCOSE SERPL-MCNC: 90 MG/DL
HBV CORE IGG+IGM SER QL: NONREACTIVE
HBV CORE IGM SER QL: NONREACTIVE
HBV SURFACE AB SER QL: NONREACTIVE
HBV SURFACE AB SERPL IA-ACNC: <3 MIU/ML
HBV SURFACE AG SER QL: NONREACTIVE
HCV AB SER QL: NONREACTIVE
HCV S/CO RATIO: 0.19 S/CO
KETONES URINE: NEGATIVE MG/DL
LEUKOCYTE ESTERASE URINE: NEGATIVE
MICROSCOPIC-UA: NORMAL
NITRITE URINE: NEGATIVE
PH URINE: 6
PHOSPHATE SERPL-MCNC: 4.3 MG/DL
POTASSIUM SERPL-SCNC: 4.6 MMOL/L
PROT UR-MCNC: 127 MG/DL
PROTEIN URINE: 300 MG/DL
RED BLOOD CELLS URINE: 0 /HPF
REVIEW: NORMAL
SODIUM SERPL-SCNC: 139 MMOL/L
SPECIFIC GRAVITY URINE: 1.01
UROBILINOGEN URINE: 0.2 MG/DL
WHITE BLOOD CELLS URINE: 1 /HPF

## 2023-07-10 LAB
M TB IFN-G BLD-IMP: NEGATIVE
QUANTIFERON TB PLUS MITOGEN MINUS NIL: 9.48 IU/ML
QUANTIFERON TB PLUS NIL: 0.07 IU/ML
QUANTIFERON TB PLUS TB1 MINUS NIL: 0.09 IU/ML
QUANTIFERON TB PLUS TB2 MINUS NIL: 0.02 IU/ML

## 2023-08-03 ENCOUNTER — APPOINTMENT (OUTPATIENT)
Dept: ULTRASOUND IMAGING | Facility: HOSPITAL | Age: 58
End: 2023-08-03

## 2024-02-16 ENCOUNTER — APPOINTMENT (OUTPATIENT)
Dept: NEPHROLOGY | Facility: CLINIC | Age: 59
End: 2024-02-16
Payer: MEDICAID

## 2024-02-16 VITALS — SYSTOLIC BLOOD PRESSURE: 130 MMHG | HEART RATE: 65 BPM | DIASTOLIC BLOOD PRESSURE: 80 MMHG

## 2024-02-16 DIAGNOSIS — D64.9 ANEMIA, UNSPECIFIED: ICD-10-CM

## 2024-02-16 PROCEDURE — 99214 OFFICE O/P EST MOD 30 MIN: CPT | Mod: GC

## 2024-02-16 PROCEDURE — G2211 COMPLEX E/M VISIT ADD ON: CPT | Mod: NC,1L

## 2024-02-16 NOTE — HISTORY OF PRESENT ILLNESS
[FreeTextEntry1] : Ms. CHASE is a 58 year old female with IgAN here for f.u after 3 year haitus. She was in usual state of health with just hx of hyperlipidemia saw Dr Cruz Godoy in 2019 for mild non nephrotic range proteinuria and normal crt. She was started on a ACEI and monitored. She had serological workup and sonogram that were not revealing. She then progressed and her proteinuria went from 700 to 1500mg/ 24 hours. She then had a kidney bx done last month that was non conclusive.  It was limited due to number of glomeruli but had ? immune complex deposition. EF showed 50% foot process effacement and no tissue for IF.   She had a repeat kidney bx done by IR at [facility] in 2020 and following the kidney bx had a moderate hematoma and hgb drop from 12-10-8 and then dc on 9g/dl. She had a repeat ct scan that showed no change in size on dc and stayed in hospital for 4 days. She had Bx showing IgA Neph with significant chronic changes and lot of scarring in the biopsy.   Since 2020, was placed on losartan and she has not seen me since then. Sees PMD and bp under control. No new meds. Today she is feeling well, has no acute complaints. Denies fever, cough, SOB, chest pain, edema of LE, hematuria, dysuria. N/V-  No wt loss.  2/16/24 Pt was accompanied by her daughter and  today.  She was advised by her PCP to follow up with nephrology recently. She was found to be anemic and has a drop in Hb recently with out any known bleeding. She endorsed compliance with the medications and follow ups. She mentioned that she has been feeling tired lately. Iron studies were done and revealed iron deficiency as well. She also told that farxiga is making her more tired and dizzy sometimes. No change in urine output/ no foamy urine. No recent hospitalizations.

## 2024-02-16 NOTE — REVIEW OF SYSTEMS
[Feeling Poorly] : feeling poorly [Feeling Tired] : feeling tired [SOB on Exertion] : shortness of breath during exertion [Negative] : Heme/Lymph [Fever] : no fever [Chills] : no chills [Abdominal Pain] : no abdominal pain [Vomiting] : no vomiting [Constipation] : no constipation [Diarrhea] : no diarrhea [Heartburn] : no heartburn [Melena] : no melena [Dysuria] : no dysuria [Incontinence] : no incontinence [Pelvic Pain] : no pelvic pain [Joint Swelling] : no joint swelling [Joint Stiffness] : no joint stiffness [Limb Pain] : no limb pain [Limb Swelling] : no limb swelling [Skin Lesions] : no skin lesions [Skin Wound] : no skin wound [Itching] : no itching [Confused] : no confusion [Convulsions] : no convulsions [Fainting] : no fainting [Limb Weakness] : no limb weakness [Difficulty Walking] : no difficulty walking

## 2024-02-16 NOTE — ASSESSMENT
[FreeTextEntry1] : 58 year old female with IgA nephropathy - presenting today for follow up after 3 year pandemic haitus   # IgA nephropathy and CKD stage 3 She is on Losartan 50 qd and Farxiga 10mg qd.  Scr has slightly worsened to 3.02 Jan 2024. UPCR was 6.1 in July 2023.  She did nt mention in change of urine consistency/ color.  was informed to set up for transplant eval.  Daughter was explained about the transplant eval.    #HTN  controlled, on Losartan 50 and farxiga 10 but pt has dizziness with farxiga. Decrease farxiga to 5mg qd.  low salt diet   # anemia: pt has Hb of 8.7 in Jan 24 and no active bleeding as per pt.  Iron studies showed low iron stores.  Iron pills - prescribed today. Side effects were explained.  Procrit 68021 Sc was given in the clinic today.  Pt was advised to eat green leafy vegetables.  Pt and family requested for a primary care provider.  Provided the details.   RTC in 4 months.

## 2024-02-16 NOTE — PHYSICAL EXAM
[General Appearance - Alert] : alert [General Appearance - In No Acute Distress] : in no acute distress [Sclera] : the sclera and conjunctiva were normal [Extraocular Movements] : extraocular movements were intact [Outer Ear] : the ears and nose were normal in appearance [Hearing Threshold Finger Rub Not [place]] : hearing was normal [Oropharynx] : the oropharynx was normal [Neck Appearance] : the appearance of the neck was normal [Jugular Venous Distention Increased] : there was no jugular-venous distention [Auscultation Breath Sounds / Voice Sounds] : lungs were clear to auscultation bilaterally [Heart Rate And Rhythm] : heart rate was normal and rhythm regular [Heart Sounds] : normal S1 and S2 [Heart Sounds Gallop] : no gallops [Murmurs] : no murmurs [Heart Sounds Pericardial Friction Rub] : no pericardial rub [Edema] : there was no peripheral edema [Bowel Sounds] : normal bowel sounds [Abdomen Soft] : soft [Abdomen Tenderness] : non-tender [] : no hepato-splenomegaly [Abdomen Mass (___ Cm)] : no abdominal mass palpated [No CVA Tenderness] : no ~M costovertebral angle tenderness [No Spinal Tenderness] : no spinal tenderness [Abnormal Walk] : normal gait [Skin Color & Pigmentation] : normal skin color and pigmentation [No Focal Deficits] : no focal deficits [Oriented To Time, Place, And Person] : oriented to person, place, and time [Affect] : the affect was normal [Mood] : the mood was normal

## 2024-02-27 LAB
ALBUMIN SERPL ELPH-MCNC: 3.8 G/DL
ANION GAP SERPL CALC-SCNC: 13 MMOL/L
BASOPHILS # BLD AUTO: 0.03 K/UL
BASOPHILS NFR BLD AUTO: 0.5 %
BUN SERPL-MCNC: 58 MG/DL
CALCIUM SERPL-MCNC: 8.4 MG/DL
CHLORIDE SERPL-SCNC: 107 MMOL/L
CO2 SERPL-SCNC: 19 MMOL/L
CREAT SERPL-MCNC: 2.96 MG/DL
EGFR: 18 ML/MIN/1.73M2
EOSINOPHIL # BLD AUTO: 0.3 K/UL
EOSINOPHIL NFR BLD AUTO: 5.3 %
GLUCOSE SERPL-MCNC: 83 MG/DL
HCT VFR BLD CALC: 31.8 %
HGB BLD-MCNC: 10 G/DL
IMM GRANULOCYTES NFR BLD AUTO: 0.2 %
LYMPHOCYTES # BLD AUTO: 1.4 K/UL
LYMPHOCYTES NFR BLD AUTO: 24.7 %
MAN DIFF?: NORMAL
MCHC RBC-ENTMCNC: 30.7 PG
MCHC RBC-ENTMCNC: 31.4 GM/DL
MCV RBC AUTO: 97.5 FL
MONOCYTES # BLD AUTO: 0.42 K/UL
MONOCYTES NFR BLD AUTO: 7.4 %
NEUTROPHILS # BLD AUTO: 3.5 K/UL
NEUTROPHILS NFR BLD AUTO: 61.9 %
PHOSPHATE SERPL-MCNC: 4.7 MG/DL
PLATELET # BLD AUTO: 182 K/UL
POTASSIUM SERPL-SCNC: 5.1 MMOL/L
RBC # BLD: 3.26 M/UL
RBC # FLD: 13 %
SODIUM SERPL-SCNC: 139 MMOL/L
WBC # FLD AUTO: 5.66 K/UL

## 2024-03-04 ENCOUNTER — APPOINTMENT (OUTPATIENT)
Dept: NEPHROLOGY | Facility: CLINIC | Age: 59
End: 2024-03-04
Payer: MEDICAID

## 2024-03-04 VITALS
RESPIRATION RATE: 18 BRPM | OXYGEN SATURATION: 99 % | HEART RATE: 58 BPM | SYSTOLIC BLOOD PRESSURE: 144 MMHG | DIASTOLIC BLOOD PRESSURE: 67 MMHG | TEMPERATURE: 98.2 F

## 2024-03-04 PROCEDURE — T1013: CPT

## 2024-03-04 PROCEDURE — 96372 THER/PROPH/DIAG INJ SC/IM: CPT

## 2024-03-04 RX ORDER — ERYTHROPOIETIN 10000 [IU]/ML
10000 INJECTION, SOLUTION INTRAVENOUS; SUBCUTANEOUS
Qty: 1 | Refills: 0 | Status: COMPLETED | OUTPATIENT
Start: 2024-03-04

## 2024-03-04 RX ADMIN — ERYTHROPOIETIN 1 UNIT/ML: 10000 INJECTION, SOLUTION INTRAVENOUS; SUBCUTANEOUS at 00:00

## 2024-03-04 NOTE — REASON FOR VISIT
[Procedure: _________] : a [unfilled] procedure visit [[employer] ] : provided by [employer]   [Time Spent: ____ minutes] : Total time spent using  services: [unfilled] minutes. The patient's primary language is not English thus required  services. [FreeTextEntry1] : [place] [Interpreters_IDNumber] : [id number] [TWNoteComboBox1] : [place]

## 2024-03-04 NOTE — ASSESSMENT
[FreeTextEntry1] : Hgb 10.0 /Hct 31.8 BP acceptable Procrit 10,000 units SQ x 1 Pt tolerated procedure well Labs in 2 weeks

## 2024-03-15 ENCOUNTER — LABORATORY RESULT (OUTPATIENT)
Age: 59
End: 2024-03-15

## 2024-03-15 DIAGNOSIS — R39.9 UNSPECIFIED SYMPTOMS AND SIGNS INVOLVING THE GENITOURINARY SYSTEM: ICD-10-CM

## 2024-03-15 LAB
ALBUMIN SERPL ELPH-MCNC: 4 G/DL
ANION GAP SERPL CALC-SCNC: 14 MMOL/L
BUN SERPL-MCNC: 61 MG/DL
CALCIUM SERPL-MCNC: 8.4 MG/DL
CHLORIDE SERPL-SCNC: 104 MMOL/L
CO2 SERPL-SCNC: 21 MMOL/L
CREAT SERPL-MCNC: 3.47 MG/DL
EGFR: 15 ML/MIN/1.73M2
GLUCOSE SERPL-MCNC: 109 MG/DL
HCT VFR BLD CALC: 34.6 %
HGB BLD-MCNC: 10.8 G/DL
MCHC RBC-ENTMCNC: 30.8 PG
MCHC RBC-ENTMCNC: 31.2 GM/DL
MCV RBC AUTO: 98.6 FL
PHOSPHATE SERPL-MCNC: 5.6 MG/DL
PLATELET # BLD AUTO: 195 K/UL
POTASSIUM SERPL-SCNC: 5.4 MMOL/L
RBC # BLD: 3.51 M/UL
RBC # FLD: 12.4 %
SODIUM SERPL-SCNC: 139 MMOL/L
WBC # FLD AUTO: 8.65 K/UL

## 2024-03-18 ENCOUNTER — APPOINTMENT (OUTPATIENT)
Dept: NEPHROLOGY | Facility: CLINIC | Age: 59
End: 2024-03-18

## 2024-03-18 LAB
APPEARANCE: ABNORMAL
BILIRUBIN URINE: NEGATIVE
BLOOD URINE: ABNORMAL
COLOR: YELLOW
GLUCOSE QUALITATIVE U: 100 MG/DL
KETONES URINE: NEGATIVE MG/DL
LEUKOCYTE ESTERASE URINE: ABNORMAL
NITRITE URINE: NEGATIVE
PH URINE: 6
PROTEIN URINE: 100 MG/DL
SPECIFIC GRAVITY URINE: 1.01
UROBILINOGEN URINE: 0.2 MG/DL

## 2024-03-21 ENCOUNTER — APPOINTMENT (OUTPATIENT)
Dept: NEPHROLOGY | Facility: CLINIC | Age: 59
End: 2024-03-21
Payer: MEDICAID

## 2024-03-21 VITALS
DIASTOLIC BLOOD PRESSURE: 76 MMHG | HEIGHT: 58 IN | OXYGEN SATURATION: 96 % | BODY MASS INDEX: 25.45 KG/M2 | TEMPERATURE: 98.2 F | HEART RATE: 50 BPM | WEIGHT: 121.25 LBS | SYSTOLIC BLOOD PRESSURE: 183 MMHG

## 2024-03-21 VITALS — SYSTOLIC BLOOD PRESSURE: 170 MMHG | DIASTOLIC BLOOD PRESSURE: 90 MMHG

## 2024-03-21 DIAGNOSIS — E87.20 ACIDOSIS, UNSPECIFIED: ICD-10-CM

## 2024-03-21 DIAGNOSIS — E87.5 HYPERKALEMIA: ICD-10-CM

## 2024-03-21 PROCEDURE — 99214 OFFICE O/P EST MOD 30 MIN: CPT

## 2024-03-21 PROCEDURE — G2211 COMPLEX E/M VISIT ADD ON: CPT | Mod: NC,1L

## 2024-03-21 RX ORDER — MULTIVIT-MIN/IRON/FOLIC ACID/K 18-600-40
50 MCG CAPSULE ORAL
Qty: 90 | Refills: 0 | Status: ACTIVE | COMMUNITY
Start: 2024-03-21

## 2024-03-21 NOTE — HISTORY OF PRESENT ILLNESS
[FreeTextEntry1] : Ms. CHASE is a 58 year old female with IgAN here for f.u with her daughter Edith whom translates, her son Lane and her .  She had Bx showing IgA Neph with significant chronic changes and lot of scarring in the biopsy. She was last seen by Dr. Palma in February. Since her last visit she was diagnosed with a UTI and is on her last day of antibiotics. She held the Farxiga as ordered.  Her K+ was 5.4 on last labs. She picked up Lokelma and took one dose 2 days ago.  Creatinine 3.47 in the setting of the UTI.  She has a BP cuff at home but does not use it. Has been having more headaches lately.  She has an appt for initial evaluation at [facility] in April.  Today she reports her UTI symptoms have subsided. No n/v. No decrease in appetite, no tremors. No edema. No foamy urine. no hematuria, no dysuria. no confusion. No SOB, WHITE.

## 2024-03-21 NOTE — ASSESSMENT
[FreeTextEntry1] : Ms. CHASE is a 58 year old female with IgA CKD4: KATRINA in the setting of UTI. Will repeat renal panel today.  HTN: BP high. Start Nifedipine 30mg ER once daily at night.  Start home BP monitoring and bring record to office in 2 weeks. Low Na diet stressed.  Volume status: euvolemic Proteinuria: Continue ARB for antiproteinuric effect send u/a, UPCR Metabolic Acidosis: Cont sodium bicarb 2 tabs daily.  Anemia Management:  Hgb, CBC ordered. Renal bone disease: check iPTH, phos, Vit D today. The patient was educated on the importance of good blood pressure and to avoid NSAIDs. Lifestyle modifications encouraged including low Na diet and moderate protein intake. Patient educated on the progressive nature of CKD and aware of the potential need for renal replacement therapy in the future.  Proceed with transplant evaluation.   RTC in 2 weeks.

## 2024-03-21 NOTE — PHYSICAL EXAM
[General Appearance - Alert] : alert [General Appearance - In No Acute Distress] : in no acute distress [Sclera] : the sclera and conjunctiva were normal [Outer Ear] : the ears and nose were normal in appearance [Neck Appearance] : the appearance of the neck was normal [Heart Rate And Rhythm] : heart rate was normal and rhythm regular [Auscultation Breath Sounds / Voice Sounds] : lungs were clear to auscultation bilaterally [Heart Sounds Gallop] : no gallops [Heart Sounds] : normal S1 and S2 [Murmurs] : no murmurs [Heart Sounds Pericardial Friction Rub] : no pericardial rub [Bowel Sounds] : normal bowel sounds [Edema] : there was no peripheral edema [Abdomen Soft] : soft [No CVA Tenderness] : no ~M costovertebral angle tenderness [Abnormal Walk] : normal gait [No Spinal Tenderness] : no spinal tenderness [] : no rash [Involuntary Movements] : no involuntary movements were seen [Impaired Insight] : insight and judgment were intact [Oriented To Time, Place, And Person] : oriented to person, place, and time [No Focal Deficits] : no focal deficits [Affect] : the affect was normal

## 2024-03-21 NOTE — REASON FOR VISIT
[Follow-Up] : a follow-up visit [Spouse] : spouse [Source: ________] : History obtained from [unfilled] [Other: _____] : [unfilled] [FreeTextEntry1] : for IgAN

## 2024-03-22 LAB
25(OH)D3 SERPL-MCNC: 39.1 NG/ML
ALBUMIN SERPL ELPH-MCNC: 3.6 G/DL
ANION GAP SERPL CALC-SCNC: 13 MMOL/L
APPEARANCE: CLEAR
BACTERIA: NEGATIVE /HPF
BILIRUBIN URINE: NEGATIVE
BLOOD URINE: ABNORMAL
BUN SERPL-MCNC: 59 MG/DL
CALCIUM SERPL-MCNC: 8.4 MG/DL
CALCIUM SERPL-MCNC: 8.4 MG/DL
CAST: 0 /LPF
CHLORIDE SERPL-SCNC: 103 MMOL/L
CO2 SERPL-SCNC: 23 MMOL/L
COLOR: YELLOW
CREAT SERPL-MCNC: 3.34 MG/DL
CREAT SPEC-SCNC: 32 MG/DL
CREAT/PROT UR: 5.9 RATIO
EGFR: 15 ML/MIN/1.73M2
EPITHELIAL CELLS: 2 /HPF
ESTIMATED AVERAGE GLUCOSE: 97 MG/DL
FERRITIN SERPL-MCNC: 527 NG/ML
FOLATE SERPL-MCNC: 10.6 NG/ML
GLUCOSE QUALITATIVE U: NEGATIVE MG/DL
GLUCOSE SERPL-MCNC: 86 MG/DL
HBA1C MFR BLD HPLC: 5 %
HCT VFR BLD CALC: 33.6 %
HGB BLD-MCNC: 10.9 G/DL
IRON SATN MFR SERPL: 26 %
IRON SERPL-MCNC: 55 UG/DL
KETONES URINE: NEGATIVE MG/DL
LEUKOCYTE ESTERASE URINE: NEGATIVE
MCHC RBC-ENTMCNC: 31.5 PG
MCHC RBC-ENTMCNC: 32.4 GM/DL
MCV RBC AUTO: 97.1 FL
MICROSCOPIC-UA: NORMAL
NITRITE URINE: NEGATIVE
PARATHYROID HORMONE INTACT: 191 PG/ML
PH URINE: 6
PHOSPHATE SERPL-MCNC: 4.8 MG/DL
PLATELET # BLD AUTO: 170 K/UL
POTASSIUM SERPL-SCNC: 5.1 MMOL/L
PROT UR-MCNC: 189 MG/DL
PROTEIN URINE: 300 MG/DL
RBC # BLD: 3.46 M/UL
RBC # FLD: 12.1 %
RED BLOOD CELLS URINE: 2 /HPF
REVIEW: NORMAL
SODIUM SERPL-SCNC: 139 MMOL/L
SPECIFIC GRAVITY URINE: 1.01
TIBC SERPL-MCNC: 211 UG/DL
UIBC SERPL-MCNC: 156 UG/DL
URATE SERPL-MCNC: 7.5 MG/DL
UROBILINOGEN URINE: 0.2 MG/DL
VIT B12 SERPL-MCNC: 1116 PG/ML
WBC # FLD AUTO: 5.83 K/UL
WHITE BLOOD CELLS URINE: 2 /HPF

## 2024-03-22 RX ORDER — FERROUS SULFATE 325(65) MG
325 (65 FE) TABLET ORAL
Qty: 90 | Refills: 3 | Status: ACTIVE | COMMUNITY
Start: 2024-03-22 | End: 1900-01-01

## 2024-04-05 ENCOUNTER — APPOINTMENT (OUTPATIENT)
Dept: NEPHROLOGY | Facility: CLINIC | Age: 59
End: 2024-04-05
Payer: MEDICAID

## 2024-04-05 VITALS
TEMPERATURE: 97.5 F | OXYGEN SATURATION: 97 % | HEART RATE: 50 BPM | SYSTOLIC BLOOD PRESSURE: 147 MMHG | WEIGHT: 118 LBS | DIASTOLIC BLOOD PRESSURE: 96 MMHG | HEIGHT: 58 IN | BODY MASS INDEX: 24.77 KG/M2

## 2024-04-05 VITALS — SYSTOLIC BLOOD PRESSURE: 134 MMHG | DIASTOLIC BLOOD PRESSURE: 82 MMHG

## 2024-04-05 DIAGNOSIS — R80.9 PROTEINURIA, UNSPECIFIED: ICD-10-CM

## 2024-04-05 DIAGNOSIS — D63.1 CHRONIC KIDNEY DISEASE, UNSPECIFIED: ICD-10-CM

## 2024-04-05 DIAGNOSIS — N18.9 CHRONIC KIDNEY DISEASE, UNSPECIFIED: ICD-10-CM

## 2024-04-05 PROCEDURE — 99214 OFFICE O/P EST MOD 30 MIN: CPT

## 2024-04-05 PROCEDURE — G2211 COMPLEX E/M VISIT ADD ON: CPT | Mod: NC,1L

## 2024-04-05 RX ORDER — SODIUM ZIRCONIUM CYCLOSILICATE 10 G/10G
10 POWDER, FOR SUSPENSION ORAL
Qty: 1 | Refills: 11 | Status: ACTIVE | COMMUNITY
Start: 2024-03-15

## 2024-04-05 NOTE — PHYSICAL EXAM
[General Appearance - Alert] : alert [General Appearance - In No Acute Distress] : in no acute distress [Sclera] : the sclera and conjunctiva were normal [Outer Ear] : the ears and nose were normal in appearance [Neck Appearance] : the appearance of the neck was normal [Auscultation Breath Sounds / Voice Sounds] : lungs were clear to auscultation bilaterally [Heart Rate And Rhythm] : heart rate was normal and rhythm regular [Heart Sounds] : normal S1 and S2 [Heart Sounds Gallop] : no gallops [Murmurs] : no murmurs [Heart Sounds Pericardial Friction Rub] : no pericardial rub [Edema] : there was no peripheral edema [Bowel Sounds] : normal bowel sounds [Abdomen Soft] : soft [No CVA Tenderness] : no ~M costovertebral angle tenderness [No Spinal Tenderness] : no spinal tenderness [Abnormal Walk] : normal gait [Involuntary Movements] : no involuntary movements were seen [] : no rash [No Focal Deficits] : no focal deficits [Oriented To Time, Place, And Person] : oriented to person, place, and time [Impaired Insight] : insight and judgment were intact [Affect] : the affect was normal

## 2024-04-08 PROBLEM — R80.9 PROTEINURIA: Status: ACTIVE | Noted: 2019-04-11

## 2024-04-08 PROBLEM — N18.9 ANEMIA ASSOCIATED WITH CHRONIC RENAL FAILURE: Status: ACTIVE | Noted: 2024-01-17

## 2024-04-08 NOTE — ASSESSMENT
[FreeTextEntry1] : Ms. CHASE is a 58 year old female with IgA CKD4: eGFR 15 creatinine trend reviewed with patient and stable.  HTN: BP improved. Continue Nifedipine 30mg ER once daily at night.  Low Na diet stressed.  Volume status: euvolemic Proteinuria: Continue ARB for antiproteinuric effect. Restart Farxiga 5mg. If UTI symptoms return notify office. Maintain good genital hygeine.  Metabolic Acidosis: Cont sodium bicarb 2 tabs daily.  Hyperkalemia: controlled.  Anemia Management:  Hgb 10.9 No JYOTNSA indicated.  Renal bone disease: Low Phos diet recommended.  The patient was educated on the importance of good blood pressure and to avoid NSAIDs. Lifestyle modifications encouraged including low Na diet and moderate protein intake. Patient educated on the progressive nature of CKD and aware of the potential need for renal replacement therapy in the future.  Proceed with transplant evaluation.  Next appt with Dr. Palma June 3rd. Labs before.

## 2024-04-08 NOTE — HISTORY OF PRESENT ILLNESS
[FreeTextEntry1] : Ms. CHASE is a 58 year old female with IgAN here for f.u with her daughter Edith whom translates.  She had Bx showing IgA Neph with significant chronic changes and lot of scarring in the biopsy.  She was last seen on March 21st. At that time Nifedepine ER 30mg PO QD stated for high BPs.  UTI resolved. Still has not restarted Farxiga.  Home BPs reportedly better.  She has an appt for initial evaluation at [facility] in April.

## 2024-04-08 NOTE — REASON FOR VISIT
Encounter Date: 11/8/2022       History     Chief Complaint   Patient presents with    Back Pain     Complains of lower back pain for months that is getting worse. Denies injury     89-year-old male patient comes in with low back pain with this patient is having difficulty ambulating apparently the patient has had progressive worsening back pain over the last few months.  The neighbor had noted that the patient was having difficulty moving around.      Review of patient's allergies indicates:  No Known Allergies  History reviewed. No pertinent past medical history.  History reviewed. No pertinent surgical history.  No family history on file.  Social History     Tobacco Use    Smoking status: Never    Smokeless tobacco: Never   Substance Use Topics    Alcohol use: Yes     Comment: occas     Review of Systems   Constitutional:  Negative for fever.   HENT:  Negative for sore throat.    Respiratory:  Negative for shortness of breath.    Cardiovascular:  Negative for chest pain.   Gastrointestinal:  Negative for nausea.   Genitourinary:  Negative for dysuria.   Musculoskeletal:  Positive for back pain.   Skin:  Negative for rash.   Neurological:  Negative for weakness.   Hematological:  Does not bruise/bleed easily.     Physical Exam     Initial Vitals [11/08/22 0934]   BP Pulse Resp Temp SpO2   113/77 94 18 97.1 °F (36.2 °C) 97 %      MAP       --         Physical Exam    Nursing note and vitals reviewed.  Constitutional: He appears well-developed and well-nourished. He is not diaphoretic. No distress.   HENT:   Head: Normocephalic and atraumatic.   Right Ear: External ear normal.   Left Ear: External ear normal.   Nose: Nose normal.   Mouth/Throat: Oropharynx is clear and moist. No oropharyngeal exudate.   Eyes: Conjunctivae and EOM are normal. Pupils are equal, round, and reactive to light. Right eye exhibits no discharge. Left eye exhibits no discharge.   Neck: Neck supple. No thyromegaly present. No tracheal  deviation present. No JVD present.   Normal range of motion.  Cardiovascular:  Normal rate, regular rhythm, normal heart sounds and intact distal pulses.     Exam reveals no gallop and no friction rub.       No murmur heard.  Pulmonary/Chest: Breath sounds normal. No stridor. No respiratory distress. He has no wheezes. He has no rhonchi. He has no rales. He exhibits no tenderness.   Abdominal: Abdomen is soft. Bowel sounds are normal. He exhibits no distension. There is no abdominal tenderness. There is no rebound and no guarding.   Musculoskeletal:         General: Tenderness present. No edema. Normal range of motion.      Cervical back: Normal range of motion and neck supple.     Lymphadenopathy:     He has no cervical adenopathy.   Neurological: He is alert and oriented to person, place, and time. He has normal strength and normal reflexes. No cranial nerve deficit or sensory deficit. GCS score is 15. GCS eye subscore is 4. GCS verbal subscore is 5. GCS motor subscore is 6.   Skin: Skin is warm and dry. No rash and no abscess noted. No erythema.   Psychiatric: He has a normal mood and affect. His behavior is normal. Judgment and thought content normal.       ED Course   Procedures  Labs Reviewed   URINALYSIS, REFLEX TO URINE CULTURE - Abnormal; Notable for the following components:       Result Value    Ketones, UA 15 (*)     Bilirubin, UA Small (*)     All other components within normal limits   URINALYSIS, MICROSCOPIC - Normal          Imaging Results              X-Ray Lumbar Spine Ap And Lateral (Final result)  Result time 11/08/22 10:01:36      Final result by Lashay Henry MD (11/08/22 10:01:36)                   Impression:      Age-indeterminate compression deformities at T12 through L3 with mild loss of height.      Electronically signed by: Lashay Henry  Date:    11/08/2022  Time:    10:01               Narrative:    EXAMINATION:  XR LUMBAR SPINE AP AND LATERAL    CLINICAL HISTORY:  back  pain;    COMPARISON:  None.    FINDINGS:  The lowest fully formed disc space is labeled as L5-S1.  There is grade 1 anterolisthesis of L4 over L5.  There are superior endplate compression deformities at T12 through L3 with mild loss of height, age indeterminate.  There small multilevel marginal osteophytes.  There is moderate facet arthropathy in the lower lumbar spine.  Scattered vascular calcifications are noted.                                       Medications - No data to display                           Clinical Impression:   Final diagnoses:  [S32.010A] Compression fracture of L1 vertebra, initial encounter (Primary)      ED Disposition Condition    Discharge Stable          ED Prescriptions       Medication Sig Dispense Start Date End Date Auth. Provider    diclofenac (VOLTAREN) 50 MG EC tablet Take 1 tablet (50 mg total) by mouth 3 (three) times daily. for 3 days 9 tablet 11/8/2022 11/11/2022 Keegan Lind MD    cyclobenzaprine (FLEXERIL) 10 MG tablet Take 1 tablet (10 mg total) by mouth 3 (three) times daily as needed for Muscle spasms. 15 tablet 11/8/2022 11/13/2022 Keegan Lind MD          Follow-up Information    None          Keegan Lind MD  11/08/22 7893     [Follow-Up] : a follow-up visit [Source: ________] : History obtained from [unfilled] [FreeTextEntry1] : for IgAN

## 2024-04-09 ENCOUNTER — APPOINTMENT (OUTPATIENT)
Dept: GASTROENTEROLOGY | Facility: CLINIC | Age: 59
End: 2024-04-09
Payer: MEDICAID

## 2024-04-09 VITALS
BODY MASS INDEX: 24.98 KG/M2 | SYSTOLIC BLOOD PRESSURE: 138 MMHG | HEIGHT: 58 IN | WEIGHT: 119 LBS | HEART RATE: 97 BPM | OXYGEN SATURATION: 98 % | DIASTOLIC BLOOD PRESSURE: 80 MMHG

## 2024-04-09 DIAGNOSIS — Z12.11 ENCOUNTER FOR SCREENING FOR MALIGNANT NEOPLASM OF COLON: ICD-10-CM

## 2024-04-09 PROCEDURE — T1013: CPT

## 2024-04-09 PROCEDURE — 99203 OFFICE O/P NEW LOW 30 MIN: CPT | Mod: 25

## 2024-04-09 NOTE — ASSESSMENT
[FreeTextEntry1] : Screening, average risk Indications risks benefits and alternatives to colonoscopy reviewed with patient, patient's daughter via cell phone They are agreeable to examination, and as indicated previously, eager to have examination completed before April 25 Examination to be performed in the hospital endoscopy unit Presurgical testing ordered

## 2024-04-09 NOTE — REASON FOR VISIT
[Consultation] : a consultation visit [[employer] ] : provided by [employer]   [Time Spent: ____ minutes] : Total time spent using  services: [unfilled] minutes. The patient's primary language is not English thus required  services. [FreeTextEntry1] : Screening [Interpreters_IDNumber] : [id number] [Interpreters_FullName] : [place] [Interpreters_Relationshiptopatient] : In addition, patient's daughter conferenced in over her cell phone when we discussed scheduling and preparation

## 2024-04-09 NOTE — HISTORY OF PRESENT ILLNESS
[FreeTextEntry1] : 58-year-old female presents for evaluation prior to screening colonoscopy Patient and her daughter indicate that exam needs to be performed before April 25 Last examination over 6 years ago, no significant interval complaints No family history of colon cancer Rare reflux but denies dysphagia  Denies history of coronary disease congestive heart failure dysrhythmia or diabetes  Not currently on dialysis No surgery for fistula

## 2024-04-10 ENCOUNTER — OUTPATIENT (OUTPATIENT)
Dept: OUTPATIENT SERVICES | Facility: HOSPITAL | Age: 59
LOS: 1 days | End: 2024-04-10
Payer: MEDICAID

## 2024-04-10 VITALS
HEIGHT: 60 IN | TEMPERATURE: 98 F | RESPIRATION RATE: 16 BRPM | HEART RATE: 55 BPM | DIASTOLIC BLOOD PRESSURE: 74 MMHG | WEIGHT: 117.95 LBS | OXYGEN SATURATION: 96 % | SYSTOLIC BLOOD PRESSURE: 129 MMHG

## 2024-04-10 DIAGNOSIS — Z01.818 ENCOUNTER FOR OTHER PREPROCEDURAL EXAMINATION: ICD-10-CM

## 2024-04-10 DIAGNOSIS — Z12.11 ENCOUNTER FOR SCREENING FOR MALIGNANT NEOPLASM OF COLON: ICD-10-CM

## 2024-04-10 DIAGNOSIS — Z90.49 ACQUIRED ABSENCE OF OTHER SPECIFIED PARTS OF DIGESTIVE TRACT: Chronic | ICD-10-CM

## 2024-04-10 DIAGNOSIS — Z98.51 TUBAL LIGATION STATUS: Chronic | ICD-10-CM

## 2024-04-10 DIAGNOSIS — Z60.3 ACCULTURATION DIFFICULTY: ICD-10-CM

## 2024-04-10 DIAGNOSIS — I10 ESSENTIAL (PRIMARY) HYPERTENSION: ICD-10-CM

## 2024-04-10 PROCEDURE — G0463: CPT

## 2024-04-10 SDOH — SOCIAL STABILITY - SOCIAL INSECURITY: ACCULTURATION DIFFICULTY: Z60.3

## 2024-04-10 NOTE — H&P PST ADULT - PROBLEM SELECTOR PLAN 1
Colonoscopy on 4/12/24  Pre-op instructions provided, all questions answered  Reviewed with patient bowel prep from GI's office paperwork, she is aware she must  pre-op bowel cleanser from pharmacy    [facility] on hold since ~3/20/24

## 2024-04-10 NOTE — H&P PST ADULT - NSANTHOSAYNRD_GEN_A_CORE
No. ELIEL screening performed.  STOP BANG Legend: 0-2 = LOW Risk; 3-4 = INTERMEDIATE Risk; 5-8 = HIGH Risk

## 2024-04-10 NOTE — H&P PST ADULT - ANTERIOR CERVICAL L
Patient resting calmly on gurney with lights off in room. Patient states that her pain has subsided and is ready for re-evaluation by MD. MD notified.   normal

## 2024-04-10 NOTE — H&P PST ADULT - ASSESSMENT
Activity: Bharti twice a week, light weight at home, all house chores    DASI scale: 8.25    Mallampati: 3    Dentition: upper and lower dentures Activity: Bharti twice a week, light weights at home, all house chores    DASI scale: 87.37    Mallampati: 3    Dentition: upper and lower dentures

## 2024-04-10 NOTE — H&P PST ADULT - HISTORY OF PRESENT ILLNESS
colonoscopy,   kidney failure since 2019 -   enal biopsy bleeding 2019   59 yo Danish speaking female, PMH HTN, HLD, CKD Stage 5 2/2 IgA nephropathy. Pt. presents to [facility] for scheduled Colonoscopy as part of becoming a candidate for the kidney transplant list. Denies recent fever, chills, chest pain, SOB, changes in bowel/urinary habits or recent exposure to COVID-19 infection. Pt. denies clotting or bleeding disorders.   Pt. on Farxiga for renal protection - has not been taking last 2 weeks 2/2 UTI which was treated and resolved. Pt. will re-start after colonoscopy

## 2024-04-10 NOTE — H&P PST ADULT - NEGATIVE ENMT SYMPTOMS
no sinus symptoms/no throat pain/no dysphagia no hearing difficulty/no sinus symptoms/no throat pain/no dysphagia

## 2024-04-10 NOTE — H&P PST ADULT - NSICDXPASTMEDICALHX_GEN_ALL_CORE_FT
PAST MEDICAL HISTORY:  H/O bleeding following renal biopsy     Hyperlipidemia     Proteinuria      PAST MEDICAL HISTORY:  H/O bleeding following renal biopsy     H/O transfusion of whole blood     Hyperlipidemia     Hypertension     Proteinuria     Stage 5 chronic kidney disease not on chronic dialysis     Uses Telugu as primary spoken language

## 2024-04-12 ENCOUNTER — OUTPATIENT (OUTPATIENT)
Dept: OUTPATIENT SERVICES | Facility: HOSPITAL | Age: 59
LOS: 1 days | End: 2024-04-12
Payer: MEDICAID

## 2024-04-12 ENCOUNTER — TRANSCRIPTION ENCOUNTER (OUTPATIENT)
Age: 59
End: 2024-04-12

## 2024-04-12 ENCOUNTER — RESULT REVIEW (OUTPATIENT)
Age: 59
End: 2024-04-12

## 2024-04-12 ENCOUNTER — APPOINTMENT (OUTPATIENT)
Dept: GASTROENTEROLOGY | Facility: HOSPITAL | Age: 59
End: 2024-04-12

## 2024-04-12 VITALS
HEART RATE: 75 BPM | WEIGHT: 121.03 LBS | DIASTOLIC BLOOD PRESSURE: 81 MMHG | TEMPERATURE: 99 F | HEIGHT: 61 IN | OXYGEN SATURATION: 96 % | SYSTOLIC BLOOD PRESSURE: 152 MMHG | RESPIRATION RATE: 16 BRPM

## 2024-04-12 VITALS
HEART RATE: 58 BPM | DIASTOLIC BLOOD PRESSURE: 72 MMHG | SYSTOLIC BLOOD PRESSURE: 150 MMHG | OXYGEN SATURATION: 98 % | RESPIRATION RATE: 20 BRPM

## 2024-04-12 DIAGNOSIS — Z12.11 ENCOUNTER FOR SCREENING FOR MALIGNANT NEOPLASM OF COLON: ICD-10-CM

## 2024-04-12 DIAGNOSIS — Z98.51 TUBAL LIGATION STATUS: Chronic | ICD-10-CM

## 2024-04-12 DIAGNOSIS — Z90.49 ACQUIRED ABSENCE OF OTHER SPECIFIED PARTS OF DIGESTIVE TRACT: Chronic | ICD-10-CM

## 2024-04-12 PROCEDURE — 88305 TISSUE EXAM BY PATHOLOGIST: CPT

## 2024-04-12 PROCEDURE — C1889: CPT

## 2024-04-12 PROCEDURE — 45385 COLONOSCOPY W/LESION REMOVAL: CPT

## 2024-04-12 PROCEDURE — 45385 COLONOSCOPY W/LESION REMOVAL: CPT | Mod: PT

## 2024-04-12 PROCEDURE — 88305 TISSUE EXAM BY PATHOLOGIST: CPT | Mod: 26

## 2024-04-12 DEVICE — CLIP RESOLUTION 360 235CM: Type: IMPLANTABLE DEVICE | Status: FUNCTIONAL

## 2024-04-12 DEVICE — NET RETRV ROT ROTH 2.5MMX230CM: Type: IMPLANTABLE DEVICE | Status: FUNCTIONAL

## 2024-04-12 RX ORDER — ERGOCALCIFEROL 1.25 MG/1
1 CAPSULE ORAL
Refills: 0 | DISCHARGE

## 2024-04-12 RX ORDER — SODIUM BICARBONATE 1 MEQ/ML
1 SYRINGE (ML) INTRAVENOUS
Refills: 0 | DISCHARGE

## 2024-04-12 RX ORDER — NIFEDIPINE 30 MG
1 TABLET, EXTENDED RELEASE 24 HR ORAL
Refills: 0 | DISCHARGE

## 2024-04-12 RX ORDER — LOSARTAN POTASSIUM 100 MG/1
1 TABLET, FILM COATED ORAL
Refills: 0 | DISCHARGE

## 2024-04-12 RX ORDER — DAPAGLIFLOZIN 10 MG/1
1 TABLET, FILM COATED ORAL
Refills: 0 | DISCHARGE

## 2024-04-12 RX ORDER — SODIUM ZIRCONIUM CYCLOSILICATE 10 G/10G
10 POWDER, FOR SUSPENSION ORAL
Refills: 0 | DISCHARGE

## 2024-04-12 RX ORDER — FERROUS SULFATE 325(65) MG
1 TABLET ORAL
Refills: 0 | DISCHARGE

## 2024-04-12 NOTE — ASU PATIENT PROFILE, ADULT - NSICDXPASTMEDICALHX_GEN_ALL_CORE_FT
PAST MEDICAL HISTORY:  Colon cancer screening     H/O bleeding following renal biopsy     H/O transfusion of whole blood     Hyperlipidemia     Hypertension     Proteinuria     Stage 5 chronic kidney disease not on chronic dialysis     Uses [place] as primary spoken language

## 2024-04-12 NOTE — PRE PROCEDURE NOTE - PRE PROCEDURE EVALUATION
Attending Physician:                       Dr. Evans     Procedure: colonoscopy    Indication for Procedure: colon cancer screening  ________________________________________________________  PAST MEDICAL & SURGICAL HISTORY:  Proteinuria      Hyperlipidemia      H/O bleeding following renal biopsy      Stage 5 chronic kidney disease not on chronic dialysis      H/O transfusion of whole blood      Hypertension      Uses Romanian as primary spoken language      Colon cancer screening      S/P cholecystectomy      H/O tubal ligation        ALLERGIES:  No Known Allergies    HOME MEDICATIONS:  ergocalciferol 50 mcg (2000 intl units) oral capsule: 1 cap(s) orally once a day  Farxiga 5 mg oral tablet: 1 tab(s) orally once a day  FeroSul 325 mg (65 mg elemental iron) oral tablet: 1 tab(s) orally once a day  Lokelma 10 g oral powder for reconstitution: 10 gram(s) orally 3 times a week Monday, Wednesday, Friday  losartan 50 mg oral tablet: 1 tab(s) orally once a day  NIFEdipine 30 mg oral tablet, extended release: 1 tab(s) orally once a day (at bedtime)  pravastatin 20 mg oral tablet: 1 tab(s) orally once a day (at bedtime)  sodium bicarbonate 650 mg oral tablet: 1 tab(s) orally 2 times a day    AICD/PPM: [ ] yes   [ ] no    PERTINENT LAB DATA:                      PHYSICAL EXAMINATION:    T(C): --  HR: --  BP: --  RR: --  SpO2: --    Constitutional: NAD  HEENT: PERRLA, EOMI,    Neck:  No JVD  Respiratory: CTAB/L  Cardiovascular: S1 and S2  Gastrointestinal: BS+, soft, NT/ND  Extremities: No peripheral edema  Neurological: A/O x 3, no focal deficits  Psychiatric: Normal mood, normal affect  Skin: No rashes    ASA Class: I [ ]  II [ ]  III [x ]  IV [ ]    COMMENTS:    The patient is a suitable candidate for the planned procedure unless box checked [ ]  No, explain:

## 2024-04-16 LAB — SURGICAL PATHOLOGY STUDY: SIGNIFICANT CHANGE UP

## 2024-04-25 ENCOUNTER — APPOINTMENT (OUTPATIENT)
Dept: NEPHROLOGY | Facility: CLINIC | Age: 59
End: 2024-04-25
Payer: COMMERCIAL

## 2024-04-25 ENCOUNTER — NON-APPOINTMENT (OUTPATIENT)
Age: 59
End: 2024-04-25

## 2024-04-25 ENCOUNTER — APPOINTMENT (OUTPATIENT)
Dept: TRANSPLANT | Facility: CLINIC | Age: 59
End: 2024-04-25
Payer: COMMERCIAL

## 2024-04-25 VITALS
OXYGEN SATURATION: 97 % | HEIGHT: 58 IN | HEART RATE: 60 BPM | TEMPERATURE: 98 F | BODY MASS INDEX: 25.19 KG/M2 | SYSTOLIC BLOOD PRESSURE: 160 MMHG | WEIGHT: 120 LBS | RESPIRATION RATE: 18 BRPM | DIASTOLIC BLOOD PRESSURE: 77 MMHG

## 2024-04-25 PROCEDURE — 99204 OFFICE O/P NEW MOD 45 MIN: CPT

## 2024-04-25 PROCEDURE — 99205 OFFICE O/P NEW HI 60 MIN: CPT

## 2024-04-25 RX ORDER — CEPHALEXIN 500 MG/1
500 TABLET ORAL
Qty: 14 | Refills: 0 | Status: COMPLETED | COMMUNITY
Start: 2024-03-15 | End: 2024-04-25

## 2024-04-25 RX ORDER — POLYETHYLENE GLYCOL 3350 AND ELECTROLYTES WITH LEMON FLAVOR 236; 22.74; 6.74; 5.86; 2.97 G/4L; G/4L; G/4L; G/4L; G/4L
236 POWDER, FOR SOLUTION ORAL
Qty: 1 | Refills: 0 | Status: COMPLETED | COMMUNITY
Start: 2024-04-09 | End: 2024-04-25

## 2024-04-25 NOTE — REASON FOR VISIT
[Initial] : an initial visit for [End-Stage Renal Disease] : end-stage renal disease [Kidney Transplant Evaluation] : kidney transplant evaluation [Family Member] : family member

## 2024-04-25 NOTE — HISTORY OF PRESENT ILLNESS
[TextBox_42] : 59yo woman, ckd 2/2 IgA nephropathy, preemptive candidate  PMHx HT  surgical hx cholecystectomy  meds reviewed  SHx lives with children not working, housewife, sells clothing yard sales on weekend no smoking no etoh

## 2024-04-25 NOTE — END OF VISIT
[Time Spent: ___ minutes] : I have spent [unfilled] minutes of time on the encounter. [Attestation] : We have discussed with the patient at length the risks and benefits of transplantation. The patient is aware that transplantation is a process that requires a life-long commitment, and that it is a personal choice to proceed with it rather than to remain with alternative treatments such as dialysis.  We discussed the differences in quality of life and patient survival between remaining on dialysis versus receiving a kidney transplant. We also discussed increased benefits of receiving a live donor kidney transplant versus a  donor kidney transplant. We discussed the risks of kidney transplantation in depth. Surgical risks included but were not limited to bleeding, infection, graft thrombosis, ureteral and vascular strictures, delayed graft function, urine leak, the development of fluid collections, cardiac events, damage to native blood vessels and potential need for re operation postoperatively. We also discussed the risks of postoperative immunosuppression including but not limited to increased risk of infection, cancer, weight gain, new onset or worsening of diabetes or hypertension on a temporary or permanent basis, water retention, back pain, constipation, diarrhea, dizziness, headache, joint pain, loss of appetite, nausea, stomach pain or upset, trouble sleeping, vomiting, and/or mental or mood changes were.  The patient also understands that there is a risk of recurrent primary kidney disease in the transplanted organ. We also discussed the risks and benefits of receiving a kidney from a donor with a Kidney Donor Profile Index (KDPI) score greater than 85% including a greater risk of delayed graft function, increased need for dialysis within the first 2-3 weeks after transplant, and statistically lower graft survival at 3 years. We discussed the one-year observed and expected patient and graft survival rates in comparison to the national one-year averages as described in the evaluation consent forms. Patient consent is in the chart. Patient is aware that they may withdraw their consent for transplantation at any time. I have answered all of the patient's questions as well as all questions of those present with the patient.  At the culmination of the patient's transplant candidacy workup, the patient will be presented to the Multidisciplinary Transplant Selection Committee for a decision whether to proceed with listing and transplantation.

## 2024-04-25 NOTE — ASSESSMENT
[Good candidate] : a good candidate. We should proceed with our protocol for evaluation for kidney transplantation. [FreeTextEntry1] : ct a/p non-con no surgical issues apparent

## 2024-04-26 NOTE — PLAN
[FreeTextEntry1] : 1. Advanced CKD:  Ms. ANIBAL CHASE  is a good  candidate for kidney transplantation  2.Cardiac risk: echo, stress test and cardiac evaluation  3. Cancer screening: colonoscopy, Mammo, Pap  4. ID: Serology for acute and chronic viral infections. Screening for latent TB 5. Imaging: Renal/abdominal /chest /Iliac imaging  I have personally discussed the risks and benefits of transplantation and patient attended transplant education class where the following was disclosed:   Reviewed factors affecting survival and morbidity while on dialysis, the transplant wait list and reviewed palak-operative and long-term risk factors affecting outcome in kidney transplantation.    One year SRTR outcomes for national and [facility] were discussed in regards to patient survival and graft survival after transplantation.    Details of transplant surgery, including complications were discussed. Immunosuppression and complications including infection including life threatening sepsis and opportunistic infections, malignancy and new onset diabetes were discussed.    Benefits of live donor transplantation as well as variability in wait times across regions and multiple listing were discussed.  KDPI >85% and PHS high risk criteria donors were discussed.  HCV kidney transplantation was discussed.   Will proceed with completing/ updating work up and listing for transplant/ live donor transplant once work up is reviewed and found to be acceptable by multidisciplinary listing committee.

## 2024-04-26 NOTE — HISTORY OF PRESENT ILLNESS
[TextBox_42] : ANIBAL CHASE is a 58 year old female who presents for kidney transplant evaluation.  Cause of ESRD : IgA nephropathy  Nephrologist: Dr Yossi Palma Pre-emptive candidate    Other PMH - has HTN.   no h/o MI , CHF, CAD no h/o COPD, Asthma no h/o TB, HIV, Hepatitis   no h/o malignancy or bleeding disorders. No DVT/PE no h/o diabetes, no Thyroid disease no h/o CVA no h/o renal stones  Sensitization events- no h/o blood transfusions . no h/o previous transplant Surgical h/o : none Functional status: is good and she is active.  Social history: lives with her children.  Is a non smoker, no alcohol or illicit use. Family history:  no family h/o kidney disease

## 2024-04-29 LAB
ABO + RH PNL BLD: NORMAL
ALBUMIN SERPL ELPH-MCNC: 4 G/DL
ALP BLD-CCNC: 115 U/L
ALT SERPL-CCNC: 12 U/L
ANION GAP SERPL CALC-SCNC: 13 MMOL/L
APPEARANCE: CLEAR
AST SERPL-CCNC: 16 U/L
BACTERIA: NEGATIVE /HPF
BASOPHILS # BLD AUTO: 0.03 K/UL
BASOPHILS NFR BLD AUTO: 0.5 %
BILIRUB SERPL-MCNC: <0.2 MG/DL
BILIRUBIN URINE: NEGATIVE
BLOOD URINE: ABNORMAL
BUN SERPL-MCNC: 75 MG/DL
C PEPTIDE SERPL-MCNC: 4.3 NG/ML
CALCIUM SERPL-MCNC: 8.7 MG/DL
CAST: 2 /LPF
CHLORIDE SERPL-SCNC: 100 MMOL/L
CHOLEST SERPL-MCNC: 236 MG/DL
CMV IGG SERPL QL: 7.6 U/ML
CMV IGG SERPL-IMP: POSITIVE
CO2 SERPL-SCNC: 21 MMOL/L
COLOR: YELLOW
COVID-19 NUCLEOCAPSID  GAM ANTIBODY INTERPRETATION: POSITIVE
COVID-19 SPIKE DOMAIN ANTIBODY INTERPRETATION: POSITIVE
CREAT SERPL-MCNC: 3.54 MG/DL
CREAT SPEC-SCNC: 51 MG/DL
CREAT/PROT UR: 5.5 RATIO
EBV EA AB SER IA-ACNC: 56.6 U/ML
EBV EA AB TITR SER IF: POSITIVE
EBV EA IGG SER QL IA: >600 U/ML
EBV EA IGG SER-ACNC: POSITIVE
EBV EA IGM SER IA-ACNC: NEGATIVE
EBV PATRN SPEC IB-IMP: NORMAL
EBV VCA IGG SER IA-ACNC: >750 U/ML
EBV VCA IGM SER QL IA: 17.5 U/ML
EGFR: 14 ML/MIN/1.73M2
EOSINOPHIL # BLD AUTO: 0.49 K/UL
EOSINOPHIL NFR BLD AUTO: 8.1 %
EPITHELIAL CELLS: 1 /HPF
EPSTEIN-BARR VIRUS CAPSID ANTIGEN IGG: POSITIVE
ESTIMATED AVERAGE GLUCOSE: 123 MG/DL
GLUCOSE QUALITATIVE U: 500 MG/DL
GLUCOSE SERPL-MCNC: 92 MG/DL
HAV IGM SER QL: NONREACTIVE
HBA1C MFR BLD HPLC: 5.9 %
HBV CORE IGG+IGM SER QL: NONREACTIVE
HBV SURFACE AB SER QL: NONREACTIVE
HBV SURFACE AB SERPL IA-ACNC: <3 MIU/ML
HBV SURFACE AG SER QL: NONREACTIVE
HCG SERPL-MCNC: 7 MIU/ML
HCT VFR BLD CALC: 33.9 %
HCV AB SER QL: NONREACTIVE
HCV S/CO RATIO: 0.25 S/CO
HDLC SERPL-MCNC: 84 MG/DL
HEPATITIS A IGG ANTIBODY: REACTIVE
HGB BLD-MCNC: 11 G/DL
HIV1+2 AB SPEC QL IA.RAPID: NONREACTIVE
HSV 1+2 IGG SER IA-IMP: NEGATIVE
HSV 1+2 IGG SER IA-IMP: POSITIVE
HSV1 IGG SER QL: 34.1 INDEX
HSV2 IGG SER QL: 0.15 INDEX
IMM GRANULOCYTES NFR BLD AUTO: 0.2 %
KETONES URINE: NEGATIVE MG/DL
LDLC SERPL CALC-MCNC: 122 MG/DL
LEUKOCYTE ESTERASE URINE: NEGATIVE
LYMPHOCYTES # BLD AUTO: 1.77 K/UL
LYMPHOCYTES NFR BLD AUTO: 29.3 %
M TB IFN-G BLD-IMP: NEGATIVE
MAGNESIUM SERPL-MCNC: 2.3 MG/DL
MAN DIFF?: NORMAL
MCHC RBC-ENTMCNC: 30.1 PG
MCHC RBC-ENTMCNC: 32.4 GM/DL
MCV RBC AUTO: 92.9 FL
MICROSCOPIC-UA: NORMAL
MONOCYTES # BLD AUTO: 0.48 K/UL
MONOCYTES NFR BLD AUTO: 7.9 %
NEUTROPHILS # BLD AUTO: 3.27 K/UL
NEUTROPHILS NFR BLD AUTO: 54 %
NITRITE URINE: NEGATIVE
NONHDLC SERPL-MCNC: 152 MG/DL
PH URINE: 6
PHOSPHATE SERPL-MCNC: 5.1 MG/DL
PLATELET # BLD AUTO: 184 K/UL
POTASSIUM SERPL-SCNC: 4.6 MMOL/L
PROT SERPL-MCNC: 6.9 G/DL
PROT UR-MCNC: 281 MG/DL
PROTEIN URINE: 300 MG/DL
QUANTIFERON TB PLUS MITOGEN MINUS NIL: 4.49 IU/ML
QUANTIFERON TB PLUS NIL: 0.09 IU/ML
QUANTIFERON TB PLUS TB1 MINUS NIL: 0 IU/ML
QUANTIFERON TB PLUS TB2 MINUS NIL: 0 IU/ML
RBC # BLD: 3.65 M/UL
RBC # FLD: 11.9 %
RED BLOOD CELLS URINE: 7 /HPF
RUBV IGG FLD-ACNC: 1.6 INDEX
RUBV IGG SER-IMP: POSITIVE
SARS-COV-2 AB SERPL IA-ACNC: >250 U/ML
SARS-COV-2 AB SERPL QL IA: 187 INDEX
SODIUM SERPL-SCNC: 134 MMOL/L
SPECIFIC GRAVITY URINE: 1.01
T GONDII AB SER-IMP: POSITIVE
T GONDII IGG SER QL: 13.5 IU/ML
T PALLIDUM AB SER QL IA: NEGATIVE
TRIGL SERPL-MCNC: 178 MG/DL
UROBILINOGEN URINE: 0.2 MG/DL
VZV AB TITR SER: POSITIVE
VZV IGG SER IF-ACNC: 2653 INDEX
WBC # FLD AUTO: 6.05 K/UL
WHITE BLOOD CELLS URINE: 1 /HPF

## 2024-04-30 ENCOUNTER — NON-APPOINTMENT (OUTPATIENT)
Age: 59
End: 2024-04-30

## 2024-04-30 PROBLEM — N18.5 CHRONIC KIDNEY DISEASE, STAGE 5: Chronic | Status: ACTIVE | Noted: 2024-04-10

## 2024-04-30 PROBLEM — Z78.9 OTHER SPECIFIED HEALTH STATUS: Chronic | Status: ACTIVE | Noted: 2024-04-10

## 2024-04-30 PROBLEM — Z87.898 PERSONAL HISTORY OF OTHER SPECIFIED CONDITIONS: Chronic | Status: ACTIVE | Noted: 2024-04-10

## 2024-04-30 PROBLEM — Z12.11 ENCOUNTER FOR SCREENING FOR MALIGNANT NEOPLASM OF COLON: Chronic | Status: ACTIVE | Noted: 2024-04-10

## 2024-04-30 PROBLEM — I10 ESSENTIAL (PRIMARY) HYPERTENSION: Chronic | Status: ACTIVE | Noted: 2024-04-10

## 2024-04-30 PROBLEM — Z92.89 PERSONAL HISTORY OF OTHER MEDICAL TREATMENT: Chronic | Status: ACTIVE | Noted: 2024-04-10

## 2024-05-01 ENCOUNTER — APPOINTMENT (OUTPATIENT)
Dept: INFECTIOUS DISEASE | Facility: CLINIC | Age: 59
End: 2024-05-01
Payer: COMMERCIAL

## 2024-05-01 ENCOUNTER — NON-APPOINTMENT (OUTPATIENT)
Age: 59
End: 2024-05-01

## 2024-05-01 VITALS
DIASTOLIC BLOOD PRESSURE: 84 MMHG | WEIGHT: 120 LBS | HEART RATE: 55 BPM | BODY MASS INDEX: 25.19 KG/M2 | HEIGHT: 58 IN | SYSTOLIC BLOOD PRESSURE: 143 MMHG | TEMPERATURE: 98.1 F | OXYGEN SATURATION: 97 % | RESPIRATION RATE: 18 BRPM

## 2024-05-01 DIAGNOSIS — Z23 ENCOUNTER FOR IMMUNIZATION: ICD-10-CM

## 2024-05-01 PROCEDURE — 99205 OFFICE O/P NEW HI 60 MIN: CPT

## 2024-05-01 RX ORDER — ZOSTER VACCINE RECOMBINANT, ADJUVANTED 50 MCG/0.5
50 KIT INTRAMUSCULAR
Qty: 2 | Refills: 0 | Status: ACTIVE | COMMUNITY
Start: 2024-05-01 | End: 1900-01-01

## 2024-05-01 NOTE — ASSESSMENT
[FreeTextEntry1] : A. ACTIVE INFECTIOUS DISEASES ISSUES 1Recent UTi 3/15 with Strep agalactae 2 History of MC herpes ? reports in the vulva she and buttock she had a rash and was told "culebrilla" which is Shingles, but by location may be HSV- in 2015 3. From [facility] ----------------- B. PRE-TRANSPLANT EVALUTION AND PREVENTIVE CARE ------- - No current contraindication from the Infectious Diseases standpoint to proceed with listing/transplantation - Prior to activation, recommend : ------- 1. Routine and additional ID screening Please send, if not done yet and/or follow: - HIV Ab/Ag neg - HSV 1/2 IgG pos, neg - VZV IgG pos - EBV Serology panel (or VCA IgG)pos - CMV IgGpos - Hepatitis A IgG pos - Hepatitis B surface IgG NEG, non immune, needs vaccination 2 doses of heplislav - Hepatitis B core IgG neg - Hepatitis B surface Antigen neg - Hepatitis C Antibody neg - Measles (Rubeola) IgG pos - Rubella IgG ([place] Measles)pos - Mumps IgG not on file - Syphilis screening (antitreponemal antibody with reflex to RPR) neg - Quantiferon Gold  neg -Toxoplasma IgG pos - Strongyloides IgG not on file- ordered - T cruzi - cysticercosis - Q fever - Brucella IgG  ----- 2. Immunizations  -Overall, vaccinations are recommended ideally in the pre-transplant period, at least 14 days prior to transplantation.  -If transplantation is felt imminent, then immunizations should be deferred to the post-transplant period, at least 4-6 months post-transplant with the exception of COVID-19 vaccination and the flu vaccine, which can be started as early as 1 month post-transplant. Similarly, all incomplete schedules prior to transplantation should be deferred to 4-6 months post-transplant.  - No live vaccinations should be administered unless transplantation is not anticipated or planned within the 4 weeks following vaccine administration.   Recommend administration of: - seasonable flu, covid and RSV when  - hepatitis A immune - Heplislav 2 doses 1 month apart - Tdap  (tetanus, diphteria, pertussis): once, with follow up Td every 10 years needed - Prevnar 20 (pneumococcal vaccine): once with no additional pneumococcal vaccine needed - Shingrix (Herpes Zoster): Will need a second dose 2 to 6 months later if not transplanted at the time of due vaccine. - HPV vaccine Gardisil-9. Will need 2 additional doses after 1-2 months and 6 months,  if not transplanted at the time of due vaccine.  She wants to think about vaccinations and defer them for MAy 8 when she comes back. She is concerned she may feel unwell tomorrow with procedure. Instructed to arrange follow up appt for vaccines   3. PERIOPERATIVE AND POST-TRANSPLANT PROPHYLAXIS  Perioperative ANTIBACTERIAL prophylaxis: - No history of MDRO: prophylaxis per protocol   VIRAL Prophylaxis; --- CMV PROPHYLAXIS: based on donor/recipient IgG status  CMV R+: intermediate risk of reactivation.  Valcyte for 3-6 months , ideally at full dose of 900 mg daily to avoid resistances. Alternatively, a preemptive approach may be elected of weekly plasma PCR checks and early treatment with reactivation.  Recommend not decreasing valcyte dose for neutropenia/leucopenia as this could lead to resistance development. A preemptive approach is preferred in those instances or use of letermovir as prophylaxis.  Once antivirals are discontinued, recommend serial CMV PCR weekly for 1 months, then every 2 weeks for 1 months, then monthly until 6 months have passed to detect early reactivations.Patients should be  advised to contact transplant center for evaluation of fever, myalgias, malaise, headache, diarrhea and other new complaints  --- HSV/VZV prophylaxis: Patients who do not recive valcyte during the first month post-transplant should receive acyclovir 400 mg bid to prevent early HSV reactivation or in rare instances, donor derived HSV/VZV. longer duration may be considered in patients with history of recurrent HSV/VZV.  PCP/Toxoplasmosis:  --- PCP prophylaxis:  PO TMP-SMX 1 SS or DS daily (if at high risk of toxoplasmosis) for at least 6 months per protocol Alternatives for TMP-SMX allergic/intolerant patients  	Atovaquone 1500 PO once daily (also prevents toxoplasma) if NOT at high risk for Toxoplasmosis 	Dapsone 100 mg PO once daily (after excluding G6PD deficiency), sulfone derivative - low risk of cross-reactivity with mild sulfa allergy .       pentamidine monthly: least preferred option;   ---Toxoplasma prophylaxis:   Toxoplasmosis R+ patients are at moderate risk of reactivation and thus still would recommend bactrim or atovaquone prophylaxis for 6 months.    4. REFERRALS - Dental : for dental clearance- a yearly checkup, ideally once pre-transplant is recommended.   proided with Icelandic handout/education material for prevention of infection post-transplant, in Icelandic

## 2024-05-01 NOTE — HISTORY OF PRESENT ILLNESS
[FreeTextEntry1] : May  1 2024  2:00PM   Underlying Disease(s) Requiring Transplant:  --------------------------------------------------------------------------------- Ms. ANIBAL CHASE  is a 58 year  year-old F   undergoing evaluation for transplantation. Infectious diseases (ID) has been consulted for assessment of infection risks and to render an opinion as to whether or not they are a suitable candidate for transplantation from the infectious diseases standpoint.  ANIBAL CHASE is a 58 year old female who presents for kidney transplant evaluation. Cause of ESRD : IgA nephropathy Nephrologist: Dr Yossi Palma Pre-emptive candidate  Past/current ID issues (taken from patient report and records)  history of hospitalization due to infection: NO History of bacteremia, sepsis:NO History of endocarditis: NO History of MDRO: NO History of pneumonia:NO History of Flu/COVID 19: NO History of recurrent UTIs:yes, had one in march, did not have prior History of infectious diarrhea, Cdiff: NO History of dental infection: NO- has dentures up and down History of meningitis:NO History of sinusitis:NO Childhood history of recurrent pharyngitis/strep throat; ear infections, sinusitis: NO History of HPV/high grade dysplasia: NO History of STD: NO History of MC herpes ? reports in the vulva she and buttock she had a rash and was told "culebrilla" which is Shingles History of Shingles: ? as above History of VV Candidiasis:NO History of FUO: NO History of OM, skin soft tissue infections: NO -- History of tonsillectomy/appendectomy/splenectomy:s/p cholecystectomy? History of metal hardware (e.g:PPM/defibrillator, prosthesis, Pins/needles):no ---------------------------------------------------------------------------------------------------------------------------------- Childhood illnesses Chickenpox: yes  Measles, Rubella, Scarlet fever, RH fever: NO  --------------------------------------------------------------------------------------------------------------------------------- Exposure/Travel History:    Current Residence (Born and raised): [place]- lived in [facility] Lived in other states: No, came to [place] in 94 Travel within [place] (including desert  or [facility]): [place] and [place] Foreign travel history:  only [place] 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 Never been homeless.  no work in Healthcare setting: no ---------------------------------------------------------------------------------------------------------------------------- Animal Exposure history: 	Domestic : HAs a dog and had dogs in [facility] 	Wildlife no 	Livestock animals, had cows, sheep , dogs 	Birds :no 	Fishes :no 	other:  ----------------------------------------------------------------------------------------------------------------------------- Dietary Habits:  Reviewed risks of consuming and advised to avoid: 	Raw animal or dairy products -unpasteurized milk /cheese. 	Raw seafood -sushi 	Raw eggs - 	Raw or undercooked meat/poultry -   	Unpasteurized milk products  -   	Home made sausage - no      Unwashed vegetables -  Reviewed food safety as it relates to infection risks in the setting of transplantation in the immunocompromised host.   Reviewed CDC guidelines for the prevention of Listeria in the immunocompromised host.      Environment: 	Residence water supply: city/island 	Reviewed of occupational and recreational exposure risks as they relate to infection in the setting of transplantation.  --------------------------------------------------------------------- Prior or current immunosuppressive therapies:  ---------------------------------------------------------------------- Immunization History: ----- Childhood immunizations:      Vaccines: live vaccines are generally avoided in the transplant recipient following transplantation.  Live vaccines are permitted up to one month prior to transplant.    -------------------------------------------------------------------------------------------------------------------------------------------------- ROS GENERAL: denies chills, , night sweats, weight loss.  PSYCH: denies depression, anxiety, suicidal ideation, hallucination, and delusions SKIN: no rash or lesions; no color changes, no abnormal nevi,no  dryness, and no jaundice   EYES: denies visual changes, floaters, pain, inflammation, blurred vision, and discharge ENT: denies tinnitus, vertigo, epistaxis, oral lesion, and decreased acuity PULM: denies, hemoptysis, pleurisy CVS: denies angina, palpitations,+ orthopnea, no syncope, or heart murmur GI: denies constipation, diarrhea, melena, abdominal pain, nausea.  : denies dysuria, frequency, discharge, incontinence, stones or macroscopic hematuria MS: no arthralgias, no erythema or swelling, no myalgias, no edema, or lower back pain.  CNS: denies numbness, dizziness, seizure, or tremor ENDO: denies heat/cold intolerance, polyuria, polydipsia, malaise.   HEME: denies bruising, bleeding, lymphadenopathy, anemia, and calf pain --------------------------------------------------------------------------------------------------------------------------------------------------------- Physical Exam:  General: AOx3, NAD HEENT: NCAT, normal conjunctiva with no icterus or erythema, no oropharyngeal abnormalities, no abscess and good oral hygiene  Neck: ROM normal, supple, no meningismus, no lymphadenopathy  Cardiovascular: regular rhythm;  no murmurs, rubs, no S3, S4; palpable pedal pulses Respiratory:  no wheezes, rales or rhonchi,, normal airway entry bilaterally Abdominal:  non distended,  abdomen soft, non-tender,  with no fluid shift. No Hepatosplenomegaly.  Genitourinary: no CVA, bladder tenderness Neurological: AOx3, NAD, interactive and appropriate, spontaneously moves all extremities, CN grossly intact, no focal deficits appreciated Musculoskeletal/extremities: Strength symmetric,  no lower extremity edema  Skin: Warm, dry, no rashes, nodules, wounds or other lesions

## 2024-05-07 ENCOUNTER — NON-APPOINTMENT (OUTPATIENT)
Age: 59
End: 2024-05-07

## 2024-05-07 ENCOUNTER — TRANSCRIPTION ENCOUNTER (OUTPATIENT)
Age: 59
End: 2024-05-07

## 2024-05-08 ENCOUNTER — APPOINTMENT (OUTPATIENT)
Dept: RADIOLOGY | Facility: IMAGING CENTER | Age: 59
End: 2024-05-08

## 2024-05-08 ENCOUNTER — APPOINTMENT (OUTPATIENT)
Dept: CT IMAGING | Facility: IMAGING CENTER | Age: 59
End: 2024-05-08

## 2024-05-13 ENCOUNTER — APPOINTMENT (OUTPATIENT)
Dept: UROLOGY | Facility: CLINIC | Age: 59
End: 2024-05-13
Payer: MEDICAID

## 2024-05-13 ENCOUNTER — APPOINTMENT (OUTPATIENT)
Dept: INFECTIOUS DISEASE | Facility: CLINIC | Age: 59
End: 2024-05-13
Payer: MEDICAID

## 2024-05-13 VITALS
HEIGHT: 58 IN | SYSTOLIC BLOOD PRESSURE: 153 MMHG | WEIGHT: 122 LBS | TEMPERATURE: 97.1 F | HEART RATE: 53 BPM | BODY MASS INDEX: 25.61 KG/M2 | OXYGEN SATURATION: 98 % | DIASTOLIC BLOOD PRESSURE: 91 MMHG

## 2024-05-13 PROCEDURE — 90739 HEPB VACC 2/4 DOSE ADULT IM: CPT

## 2024-05-13 PROCEDURE — 90471 IMMUNIZATION ADMIN: CPT

## 2024-05-13 PROCEDURE — 99204 OFFICE O/P NEW MOD 45 MIN: CPT

## 2024-05-13 PROCEDURE — 90472 IMMUNIZATION ADMIN EACH ADD: CPT

## 2024-05-13 PROCEDURE — 90677 PCV20 VACCINE IM: CPT

## 2024-05-13 PROCEDURE — 90715 TDAP VACCINE 7 YRS/> IM: CPT

## 2024-05-13 PROCEDURE — G2211 COMPLEX E/M VISIT ADD ON: CPT | Mod: NC,1L

## 2024-05-13 NOTE — ASSESSMENT
[FreeTextEntry1] :  Ms. CHASE was evaluated today for microscopic hematuria.  (7 RBC/hpf) Per American Urological Association (AUA) 2020 guidelines, the patient is moderate risk for malignancy. We discussed that in the vast majority of cases, the source of microscopic hematuria is unknown but can also include urinary tract stones, our other structural abnormalities.   Recommendations -Ucx -Renal US -Cystoscopy

## 2024-05-13 NOTE — HISTORY OF PRESENT ILLNESS
[FreeTextEntry1] : 58F presents for initial evaluation of microscopic hematuria     PMH significant for: anemia, HTN, CKD, HLD  PSH significant for: appy Significant meds: sodium bicarb, iron, procrit, nifedipine, losartan, pravastatin, lokelma, farxiga     UA 4/25: moderate blood, 7 RBCs   Hx of tobacco use: No Hx of environmental exposures: No  Hx of nephrolithiasis: No  Personal Hx of hematuria previously: No  Family Hx of  malignancy: No  No other urinary complaints

## 2024-05-14 LAB
MUV AB SER-ACNC: NEGATIVE
MUV IGG SER QL IA: 6.3 AU/ML

## 2024-05-15 ENCOUNTER — NON-APPOINTMENT (OUTPATIENT)
Age: 59
End: 2024-05-15

## 2024-05-15 LAB
BACTERIA UR CULT: NORMAL
ECHINOCOCCUS AB TITR SER: NEGATIVE

## 2024-05-16 ENCOUNTER — APPOINTMENT (OUTPATIENT)
Dept: RADIOLOGY | Facility: CLINIC | Age: 59
End: 2024-05-16
Payer: COMMERCIAL

## 2024-05-16 ENCOUNTER — APPOINTMENT (OUTPATIENT)
Dept: CARDIOLOGY | Facility: CLINIC | Age: 59
End: 2024-05-16
Payer: COMMERCIAL

## 2024-05-16 ENCOUNTER — APPOINTMENT (OUTPATIENT)
Dept: CT IMAGING | Facility: CLINIC | Age: 59
End: 2024-05-16
Payer: COMMERCIAL

## 2024-05-16 ENCOUNTER — OUTPATIENT (OUTPATIENT)
Dept: OUTPATIENT SERVICES | Facility: HOSPITAL | Age: 59
LOS: 1 days | End: 2024-05-16
Payer: COMMERCIAL

## 2024-05-16 ENCOUNTER — RESULT REVIEW (OUTPATIENT)
Age: 59
End: 2024-05-16

## 2024-05-16 ENCOUNTER — NON-APPOINTMENT (OUTPATIENT)
Age: 59
End: 2024-05-16

## 2024-05-16 VITALS
SYSTOLIC BLOOD PRESSURE: 170 MMHG | HEART RATE: 49 BPM | HEIGHT: 58 IN | OXYGEN SATURATION: 97 % | BODY MASS INDEX: 25.82 KG/M2 | DIASTOLIC BLOOD PRESSURE: 90 MMHG | WEIGHT: 123 LBS

## 2024-05-16 DIAGNOSIS — Z90.49 ACQUIRED ABSENCE OF OTHER SPECIFIED PARTS OF DIGESTIVE TRACT: Chronic | ICD-10-CM

## 2024-05-16 DIAGNOSIS — Z98.51 TUBAL LIGATION STATUS: Chronic | ICD-10-CM

## 2024-05-16 DIAGNOSIS — Z01.818 ENCOUNTER FOR OTHER PREPROCEDURAL EXAMINATION: ICD-10-CM

## 2024-05-16 DIAGNOSIS — I10 ESSENTIAL (PRIMARY) HYPERTENSION: ICD-10-CM

## 2024-05-16 DIAGNOSIS — Z00.8 ENCOUNTER FOR OTHER GENERAL EXAMINATION: ICD-10-CM

## 2024-05-16 LAB
STRONGYLOIDES AB SER IA-ACNC: NEGATIVE
T CRUZI AB SER-ACNC: 0.5 IV

## 2024-05-16 PROCEDURE — 71046 X-RAY EXAM CHEST 2 VIEWS: CPT | Mod: 26

## 2024-05-16 PROCEDURE — G2211 COMPLEX E/M VISIT ADD ON: CPT

## 2024-05-16 PROCEDURE — 74176 CT ABD & PELVIS W/O CONTRAST: CPT | Mod: 26

## 2024-05-16 PROCEDURE — 93000 ELECTROCARDIOGRAM COMPLETE: CPT

## 2024-05-16 PROCEDURE — 99204 OFFICE O/P NEW MOD 45 MIN: CPT

## 2024-05-16 PROCEDURE — 74176 CT ABD & PELVIS W/O CONTRAST: CPT

## 2024-05-16 PROCEDURE — 71046 X-RAY EXAM CHEST 2 VIEWS: CPT

## 2024-05-16 NOTE — REASON FOR VISIT
[Other: _____] : [unfilled] [FreeTextEntry1] : born in [facility] preemptive candidate, creatinine 3.54 mg/dL, e GFR 14 she has had a renal biopsy x 2, IgA nephropathy no dialysis.   no smoking HTN x chronic HLD x on pravastatin,  mg/dL, 5 years prediabetes  surgical history includes cholecystectomy, some kind of ovarian surgery, cataract surgery.   She exercises by Bharti and weights and pushups.   Her father is . He  of natural causes.  Her mother is . She  from [place].  She has 2 brothers and 4 sisters. Two brother has DM and HTN. Two sisters have HTN.  She is  and has 6 children, 4 daughters and 2 sons.  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

## 2024-05-28 LAB
BRUCELLA AB IGG, EIA: NEGATIVE
BRUCELLA AB IGM, EIA: NEGATIVE
Lab: NEGATIVE
T SOL LAR AB SPEC QL IB: <0.9

## 2024-05-29 NOTE — HISTORY OF PRESENT ILLNESS
[FreeTextEntry1] : Karolina John presents today for pretransplant evaluation. Her son, Lane, assists in Mohawk translation at the patient's request.  She is a 58-year-old woman, born [place], with known cardiac risk factors of chronic hypertension, dyslipidemia (on pravastatin,  mg/dL), prediabetes and chronic renal disease (renal biopsy x 2, IgA nephropathy, preemptive candidate, creatinine 3.54 mg/dL, e GFR 14). She has no known history of coronary artery disease, stroke or smoking. Surgical history includes cholecystectomy, some kind of ovarian surgery (?), cataract surgery.   Today she reports feeling well and has no specific complaints. She exercises by dancing Bharti and doing both weights and pushups.   Her father is . He  of natural causes.  Her mother is . She  from [place].  She has 2 brothers and 4 sisters. Two brother has DM and HTN. Two sisters have HTN.  She is  and has 6 children, 4 daughters and 2 sons.  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

## 2024-05-29 NOTE — CARDIOLOGY SUMMARY
[de-identified] : 5/16/2024 - sinus bradycardia at 48 bpm, normal axis, normal intervals and morphologies, normal R-wave progression

## 2024-05-29 NOTE — END OF VISIT
[FreeTextEntry3] : I, Olivier Salas MD, personally performed the evaluation and management (E/M) services for this new patient. That E/M includes conducting the clinically appropriate initial history &/or exam, assessing all conditions, and establishing the plan of care. Today, Karla Read NP was here to observe my evaluation and management service for this patient & follow plan of care established by me going forward. [Time Spent: ___ minutes] : I have spent [unfilled] minutes of time on the encounter.

## 2024-05-29 NOTE — DISCUSSION/SUMMARY
[EKG obtained to assist in diagnosis and management of assessed problem(s)] : EKG obtained to assist in diagnosis and management of assessed problem(s) [FreeTextEntry1] : 58-year-old presents today for pretransplant evaluation with known cardiac risk factors as above.  She will schedule an echocardiogram for structural heart evaluation. A nuclear stress test will evaluate for any evidence of inducible ischemia.   Follow up pending results.

## 2024-05-30 ENCOUNTER — LABORATORY RESULT (OUTPATIENT)
Age: 59
End: 2024-05-30

## 2024-06-03 ENCOUNTER — APPOINTMENT (OUTPATIENT)
Dept: UROLOGY | Facility: CLINIC | Age: 59
End: 2024-06-03
Payer: MEDICAID

## 2024-06-03 VITALS
HEART RATE: 70 BPM | OXYGEN SATURATION: 98 % | DIASTOLIC BLOOD PRESSURE: 72 MMHG | TEMPERATURE: 97.4 F | SYSTOLIC BLOOD PRESSURE: 155 MMHG

## 2024-06-03 DIAGNOSIS — R31.29 OTHER MICROSCOPIC HEMATURIA: ICD-10-CM

## 2024-06-03 PROCEDURE — 76770 US EXAM ABDO BACK WALL COMP: CPT

## 2024-06-03 PROCEDURE — 52000 CYSTOURETHROSCOPY: CPT

## 2024-06-04 ENCOUNTER — APPOINTMENT (OUTPATIENT)
Dept: NEPHROLOGY | Facility: CLINIC | Age: 59
End: 2024-06-04
Payer: MEDICAID

## 2024-06-04 VITALS
SYSTOLIC BLOOD PRESSURE: 133 MMHG | DIASTOLIC BLOOD PRESSURE: 92 MMHG | BODY MASS INDEX: 24.98 KG/M2 | TEMPERATURE: 98.2 F | OXYGEN SATURATION: 98 % | HEART RATE: 56 BPM | WEIGHT: 119 LBS | HEIGHT: 58 IN

## 2024-06-04 DIAGNOSIS — N02.B9 OTHER RECURRENT AND PERSISTENT IMMUNOGLOBULIN A NEPHROPATHY: ICD-10-CM

## 2024-06-04 DIAGNOSIS — N18.4 CHRONIC KIDNEY DISEASE, STAGE 4 (SEVERE): ICD-10-CM

## 2024-06-04 PROCEDURE — G2211 COMPLEX E/M VISIT ADD ON: CPT | Mod: NC

## 2024-06-04 PROCEDURE — 99214 OFFICE O/P EST MOD 30 MIN: CPT

## 2024-06-04 RX ORDER — LOSARTAN POTASSIUM 50 MG/1
50 TABLET, FILM COATED ORAL
Qty: 90 | Refills: 3 | Status: ACTIVE | COMMUNITY
Start: 2019-04-11 | End: 1900-01-01

## 2024-06-04 RX ORDER — SODIUM BICARBONATE 650 MG/1
650 TABLET ORAL 3 TIMES DAILY
Qty: 270 | Refills: 3 | Status: ACTIVE | COMMUNITY
Start: 2024-03-04 | End: 1900-01-01

## 2024-06-04 RX ORDER — DAPAGLIFLOZIN 5 MG/1
5 TABLET, FILM COATED ORAL DAILY
Qty: 90 | Refills: 3 | Status: ACTIVE | COMMUNITY
Start: 2023-07-07 | End: 1900-01-01

## 2024-06-04 RX ORDER — NIFEDIPINE 30 MG/1
30 TABLET, FILM COATED, EXTENDED RELEASE ORAL
Qty: 90 | Refills: 3 | Status: ACTIVE | COMMUNITY
Start: 2024-03-21 | End: 1900-01-01

## 2024-06-04 NOTE — HISTORY OF PRESENT ILLNESS
[FreeTextEntry1] : Ms. CHASE is a 58 year old female with IgAN here for f.u with her daughter Edith whom translates, her son Lane and her .  She had Bx showing IgA Neph with significant chronic changes and lot of scarring in the biopsy.   June 2024- has had transplant eval. Cards eval complete. Urology eval also complete ongoing workup and being listed Pt taking all meds and all meds reviewed today with son at bedside as well

## 2024-06-04 NOTE — ASSESSMENT
[FreeTextEntry1] : Ms. CHASE is a 58 year old female with IgA Nephropathy and CKD that is progressive. CKD4: Overall stable and being worked up for transplant and will be listed soon. Cards eval noted HTN: Cont nifedipine and ARB, doing well- refill done  Start home BP monitoring and bring record to office in 2 weeks. Low Na diet stressed.  Volume status: euvolemic Proteinuria: Continue ARB for antiproteinuric effect send u/a, UPCR Metabolic Acidosis: Cont sodium bicarb 3 times a day( resent prescription) Anemia Management:  Hgb, CBC showing hgb drop, on iron, give procrit 10,000 units today Hematuria- not unusual with chronic IgA Neph, not sure if renal sonogram is needed but pt being planned for cysto by Urology for transplant clearance.   f/u 2 months with me and 1 month with Jody Rowley(BLANCA)

## 2024-06-13 ENCOUNTER — APPOINTMENT (OUTPATIENT)
Dept: INFECTIOUS DISEASE | Facility: CLINIC | Age: 59
End: 2024-06-13
Payer: MEDICAID

## 2024-06-13 ENCOUNTER — APPOINTMENT (OUTPATIENT)
Dept: OBGYN | Facility: CLINIC | Age: 59
End: 2024-06-13
Payer: MEDICAID

## 2024-06-13 VITALS
HEART RATE: 47 BPM | HEIGHT: 58 IN | BODY MASS INDEX: 24.98 KG/M2 | SYSTOLIC BLOOD PRESSURE: 129 MMHG | WEIGHT: 119 LBS | OXYGEN SATURATION: 100 % | TEMPERATURE: 97.5 F | DIASTOLIC BLOOD PRESSURE: 82 MMHG

## 2024-06-13 DIAGNOSIS — Z01.419 ENCOUNTER FOR GYNECOLOGICAL EXAMINATION (GENERAL) (ROUTINE) W/OUT ABNORMAL FINDINGS: ICD-10-CM

## 2024-06-13 PROCEDURE — G0010: CPT

## 2024-06-13 PROCEDURE — 90739 HEPB VACC 2/4 DOSE ADULT IM: CPT

## 2024-06-13 PROCEDURE — 99386 PREV VISIT NEW AGE 40-64: CPT

## 2024-06-13 NOTE — PHYSICAL EXAM
[Chaperone Present] : A chaperone was present in the examining room during all aspects of the physical examination [[id number]] : A chaperone was present during the pelvic exam. [FreeTextEntry2] : sun burr [Appropriately responsive] : appropriately responsive [Alert] : alert [No Acute Distress] : no acute distress [No Lymphadenopathy] : no lymphadenopathy [Regular Rate Rhythm] : regular rate rhythm [No Murmurs] : no murmurs [Clear to Auscultation B/L] : clear to auscultation bilaterally [Soft] : soft [Non-tender] : non-tender [Non-distended] : non-distended [No HSM] : No HSM [No Lesions] : no lesions [No Mass] : no mass [Oriented x3] : oriented x3 [Examination Of The Breasts] : a normal appearance [No Masses] : no breast masses were palpable [Labia Majora] : normal [Labia Minora] : normal [Normal] : normal [Normal Position] : in a normal position [Uterine Adnexae] : normal

## 2024-06-13 NOTE — HISTORY OF PRESENT ILLNESS
[Normal Amount/Duration] :  normal amount and duration [Regular Cycle Intervals] : periods have been regular [Menarche Age: ____] : age at menarche was [unfilled] [FreeTextEntry1] : 48yrs [Previously active] : previously active [Men] : men [Vaginal] : vaginal [No] : No

## 2024-06-15 LAB — HPV HIGH+LOW RISK DNA PNL CVX: NOT DETECTED

## 2024-06-18 LAB — CYTOLOGY CVX/VAG DOC THIN PREP: ABNORMAL

## 2024-06-18 NOTE — ASU PATIENT PROFILE, ADULT - NS TRANSFER DENTURES
18 Month  Well Child Check    CONCERNS: restless sleeper.   Child accompanied by mother and brother    DIET:      Appetite: Good      Milk: whole 3 CUPS/DAY      WATER: private well  STOOLS: 1 every other day  : In His Arms   FAMILY MEMBERS: mom, dad, and brother  PETS: dog  RECENT ILLNESS: no    GROWTH & DEVELOPMENT:    Imitates housework: YES  Stacks 3-4 blocks: YES  Throws a ball: YES  Vocabulary of 15-20 words, uses 2 word phrases: YES  Follows simple directions: YES  Imitates words: YES  names &/or points to body parts: YES  Listens to story, points/names objects in picture: YES  Walks well: YES  Uses spoon & cup: good at cup, not good at spoon  Scribbles: YES  Shows affection/ gives kisses: YES    Nursing notes reviewed and accepted.        Sara Vazquez is a 18 month old female who presents for 18 month well child exam.  Patient presents with Mother.    Concerns raised today include:  none     Birth history, medical history, surgical history, and family history reviewed and updated.    PHYSICAL EXAM:  Pulse 132, temperature 97.2 °F (36.2 °C), temperature source Temporal, resp. rate 36, height 31\" (78.7 cm), weight 9.75 kg (21 lb 7.9 oz), head circumference 48.2 cm (18.98\").  GENERAL:  Well appearing female child, nontoxic, no acute distress.  Alert and interactive.  SKIN:  Warm, normal turgor.  No cyanosis.  No bruises or lesions.  HEAD:  Normocephalic, atraumatic.    EYES:  Conjunctivae appear normal, non-injected, non-icteric.  EOMI (Extraocular movements intact), cover test normal.  NOSE:  Appears normal, no flaring.  EARS:  Normal pinnae.  TMs (Tympanic membranes) are transparent with good landmarks.  Tubes in place.   THROAT:  Oropharynx with moist mucous membranes and no lesions.  NECK:  Supple, no lymphadenopathy or masses.  HEART:  Regular rate and rhythm.  Quiet precordium.  Normal S1, S2.  No murmurs, rubs, gallops.   LUNGS:  Clear to auscultation bilaterally.  No wheezes, rales, rhonchi.   Normal work of breathing.  ABDOMEN:  Soft, nontender.  No organomegaly or masses.  Small umbilical hernia- easily reducible.   GENITOURINARY:  Normal female, No labial adhesions or lesions.  MUSCULOSKELETAL:  Hips within normal range of motion.  Spine straight.    EXTREMITIES:  Warm, dry, without abnormalities.  NEUROLOGIC:  Normal tone, bulk, strength.    ASSESSMENT:  18 month old female well child.    PLAN:  Well Child Check:  Meeting all milestones and doing well.  Routine vaccines were given after counseling.    Umbilical Hernia:  Unchanged- continue to follow    Chronic Otitis Media:  No issues since having tubes placed last month.     Constipation:  Better, uses Miralax typically 2x/week now    All parental concerns and questions discussed.  Anticipatory guidance provided, handout given.              Development              Diet              Accident prevention:  Childproof home, water safety              Name and vocalize objects              Drink from cup              Analgesics/antipyretics              Sun exposure / insect repellent              Tobacco-free home              Dental care              Lead exposure risk:  None              Fluoride supplementation discussed  Immunizations per orders.  Risks, benefits, and side effects discussed.  RTC (Return to clinic) for 3 year old WCE (well child examination) or sooner prn illness/concerns.   Full/Upper/Lower

## 2024-06-20 ENCOUNTER — APPOINTMENT (OUTPATIENT)
Dept: CARDIOLOGY | Facility: CLINIC | Age: 59
End: 2024-06-20
Payer: COMMERCIAL

## 2024-06-20 DIAGNOSIS — Z01.818 ENCOUNTER FOR OTHER PREPROCEDURAL EXAMINATION: ICD-10-CM

## 2024-06-20 PROCEDURE — 78452 HT MUSCLE IMAGE SPECT MULT: CPT

## 2024-06-20 PROCEDURE — A9500: CPT

## 2024-06-20 PROCEDURE — 93306 TTE W/DOPPLER COMPLETE: CPT

## 2024-06-20 PROCEDURE — 93015 CV STRESS TEST SUPVJ I&R: CPT

## 2024-07-04 ENCOUNTER — NON-APPOINTMENT (OUTPATIENT)
Age: 59
End: 2024-07-04

## 2024-07-09 ENCOUNTER — NON-APPOINTMENT (OUTPATIENT)
Age: 59
End: 2024-07-09

## 2024-07-11 ENCOUNTER — NON-APPOINTMENT (OUTPATIENT)
Age: 59
End: 2024-07-11

## 2024-08-07 NOTE — ED ADULT TRIAGE NOTE - HEIGHT IN INCHES
[Alert] : alert [Well Nourished] : well nourished [No Acute Distress] : no acute distress [Well Developed] : well developed [Normal Sclera/Conjunctiva] : normal sclera/conjunctiva [EOMI] : extra ocular movement intact [No Proptosis] : no proptosis [Normal Oropharynx] : the oropharynx was normal [No Respiratory Distress] : no respiratory distress [No Accessory Muscle Use] : no accessory muscle use [Clear to Auscultation] : lungs were clear to auscultation bilaterally [Normal S1, S2] : normal S1 and S2 [Normal Rate] : heart rate was normal [Regular Rhythm] : with a regular rhythm [No Edema] : no peripheral edema [Pedal Pulses Normal] : the pedal pulses are present [Normal Bowel Sounds] : normal bowel sounds [Not Tender] : non-tender [Not Distended] : not distended [Soft] : abdomen soft 9 [Normal Anterior Cervical Nodes] : no anterior cervical lymphadenopathy [No Spinal Tenderness] : no spinal tenderness [Spine Straight] : spine straight [No Stigmata of Cushings Syndrome] : no stigmata of Cushings Syndrome [Normal Gait] : normal gait [Normal Strength/Tone] : muscle strength and tone were normal [No Rash] : no rash [Normal Reflexes] : deep tendon reflexes were 2+ and symmetric [No Tremors] : no tremors [Oriented x3] : oriented to person, place, and time [Acanthosis Nigricans] : no acanthosis nigricans [de-identified] : heterogeneous

## 2024-08-22 ENCOUNTER — APPOINTMENT (OUTPATIENT)
Dept: NEPHROLOGY | Facility: CLINIC | Age: 59
End: 2024-08-22

## 2024-08-29 ENCOUNTER — APPOINTMENT (OUTPATIENT)
Dept: TRANSPLANT | Facility: CLINIC | Age: 59
End: 2024-08-29

## 2024-09-17 ENCOUNTER — APPOINTMENT (OUTPATIENT)
Dept: NEPHROLOGY | Facility: CLINIC | Age: 59
End: 2024-09-17
Payer: MEDICAID

## 2024-09-17 VITALS
TEMPERATURE: 97.7 F | BODY MASS INDEX: 24.98 KG/M2 | OXYGEN SATURATION: 99 % | SYSTOLIC BLOOD PRESSURE: 173 MMHG | HEART RATE: 62 BPM | DIASTOLIC BLOOD PRESSURE: 73 MMHG | WEIGHT: 119 LBS | HEIGHT: 58 IN

## 2024-09-17 DIAGNOSIS — N02.B9 OTHER RECURRENT AND PERSISTENT IMMUNOGLOBULIN A NEPHROPATHY: ICD-10-CM

## 2024-09-17 PROCEDURE — 99214 OFFICE O/P EST MOD 30 MIN: CPT

## 2024-09-17 RX ADMIN — ERYTHROPOIETIN 0 UNIT/ML: 10000 INJECTION, SOLUTION INTRAVENOUS; SUBCUTANEOUS at 00:00

## 2024-09-17 NOTE — ASSESSMENT
[FreeTextEntry1] : Ms. CHASE is a 58 year old female with IgA Nephropathy and CKD that is progressive. CKD4: Overall stable and being worked up for transplant and will be listed soon. Cards eval noted HTN: Cont nifedipine and ARB, doing well- refill done  Start home BP monitoring and bring record to office in 2 weeks. Low Na diet stressed.  Volume status: euvolemic Proteinuria: Continue ARB for antiproteinuric effect send u/a, UPCR Metabolic Acidosis: Cont sodium bicarb 3 times a day( resent prescription) Anemia Management:  Hgb, CBC showing hgb drop, on iron, give procrit 10,000 units last time, Hgb 10.1, give shot today   f/u 3 months

## 2024-09-17 NOTE — HISTORY OF PRESENT ILLNESS
1/31/2022 2006  Clarisse SOLOMON  [address] [address]    To Whom It May Concern:    This is to certify that Clarisse SOLOMON was evaluated in the Urgent Care on 1/31/2022 and is able to return to school on 2/1/2022.      Restrictions: N/A            Nabeel Telles MD    [facility]RG[facility]  [address] [facility] [address]  [phone]     [FreeTextEntry1] : Ms. CHASE is a 58 year old female with IgAN here for f.u with her daughter Edith whom translates, her son Lane and her .  She had Bx showing IgA Neph with significant chronic changes and lot of scarring in the biopsy.   June 2024- has had transplant eval. Cards eval complete. Urology eval also complete ongoing workup and being listed Pt taking all meds and all meds reviewed today with son at bedside as well  August 2024- No n/v. No decrease in appetite, no termors. No edema worsening. no decreased sleep. No foamy urine. no hematuria, no dysuria. no confusion. No SOB, WHITE. No wt loss. taking all her meds Had crt rising, protienuria stable CBC stable

## 2024-09-26 ENCOUNTER — APPOINTMENT (OUTPATIENT)
Dept: TRANSPLANT | Facility: CLINIC | Age: 59
End: 2024-09-26

## 2024-09-27 ENCOUNTER — MED ADMIN CHARGE (OUTPATIENT)
Age: 59
End: 2024-09-27

## 2024-09-27 ENCOUNTER — APPOINTMENT (OUTPATIENT)
Dept: NEPHROLOGY | Facility: CLINIC | Age: 59
End: 2024-09-27
Payer: MEDICAID

## 2024-09-27 VITALS
HEART RATE: 53 BPM | RESPIRATION RATE: 18 BRPM | HEIGHT: 58 IN | WEIGHT: 119 LBS | BODY MASS INDEX: 24.98 KG/M2 | OXYGEN SATURATION: 99 % | SYSTOLIC BLOOD PRESSURE: 155 MMHG | DIASTOLIC BLOOD PRESSURE: 78 MMHG

## 2024-09-27 VITALS — SYSTOLIC BLOOD PRESSURE: 155 MMHG | HEART RATE: 53 BPM | DIASTOLIC BLOOD PRESSURE: 88 MMHG

## 2024-09-27 DIAGNOSIS — N18.4 CHRONIC KIDNEY DISEASE, STAGE 4 (SEVERE): ICD-10-CM

## 2024-09-27 DIAGNOSIS — D63.1 CHRONIC KIDNEY DISEASE, UNSPECIFIED: ICD-10-CM

## 2024-09-27 DIAGNOSIS — N18.9 CHRONIC KIDNEY DISEASE, UNSPECIFIED: ICD-10-CM

## 2024-09-27 PROCEDURE — 96372 THER/PROPH/DIAG INJ SC/IM: CPT

## 2024-09-27 RX ORDER — ERYTHROPOIETIN 10000 [IU]/ML
10000 INJECTION, SOLUTION INTRAVENOUS; SUBCUTANEOUS
Qty: 1 | Refills: 0 | Status: COMPLETED | OUTPATIENT
Start: 2024-09-27

## 2024-09-27 RX ADMIN — ERYTHROPOIETIN 0 UNIT/ML: 10000 INJECTION, SOLUTION INTRAVENOUS; SUBCUTANEOUS at 00:00

## 2024-10-31 ENCOUNTER — APPOINTMENT (OUTPATIENT)
Dept: TRANSPLANT | Facility: CLINIC | Age: 59
End: 2024-10-31

## 2024-11-27 ENCOUNTER — LABORATORY RESULT (OUTPATIENT)
Age: 59
End: 2024-11-27

## 2024-11-27 ENCOUNTER — APPOINTMENT (OUTPATIENT)
Dept: TRANSPLANT | Facility: CLINIC | Age: 59
End: 2024-11-27

## 2024-11-27 LAB
25(OH)D3 SERPL-MCNC: 29.9 NG/ML
BASOPHILS # BLD AUTO: 0.02 K/UL
BASOPHILS NFR BLD AUTO: 0.4 %
EOSINOPHIL # BLD AUTO: 0.22 K/UL
EOSINOPHIL NFR BLD AUTO: 4.2 %
FERRITIN SERPL-MCNC: 719 NG/ML
HCT VFR BLD CALC: 29 %
HGB BLD-MCNC: 9.3 G/DL
IMM GRANULOCYTES NFR BLD AUTO: 0.2 %
LYMPHOCYTES # BLD AUTO: 1.34 K/UL
LYMPHOCYTES NFR BLD AUTO: 25.4 %
MAN DIFF?: NORMAL
MCHC RBC-ENTMCNC: 30 PG
MCHC RBC-ENTMCNC: 32.1 G/DL
MCV RBC AUTO: 93.5 FL
MONOCYTES # BLD AUTO: 0.44 K/UL
MONOCYTES NFR BLD AUTO: 8.3 %
NEUTROPHILS # BLD AUTO: 3.25 K/UL
NEUTROPHILS NFR BLD AUTO: 61.5 %
PLATELET # BLD AUTO: 173 K/UL
RBC # BLD: 3.1 M/UL
RBC # FLD: 13.1 %
WBC # FLD AUTO: 5.28 K/UL

## 2024-12-02 LAB
ALBUMIN SERPL ELPH-MCNC: 3.9 G/DL
ANION GAP SERPL CALC-SCNC: 17 MMOL/L
APPEARANCE: CLEAR
BILIRUBIN URINE: NEGATIVE
BLOOD URINE: ABNORMAL
BUN SERPL-MCNC: 94 MG/DL
CALCIUM SERPL-MCNC: 8 MG/DL
CHLORIDE SERPL-SCNC: 104 MMOL/L
CO2 SERPL-SCNC: 16 MMOL/L
COLOR: YELLOW
CREAT SERPL-MCNC: 6.02 MG/DL
CREAT SPEC-SCNC: 33 MG/DL
CREAT/PROT UR: 3 RATIO
EGFR: 8 ML/MIN/1.73M2
GLUCOSE QUALITATIVE U: 250 MG/DL
GLUCOSE SERPL-MCNC: 96 MG/DL
IRON SATN MFR SERPL: 31 %
IRON SERPL-MCNC: 72 UG/DL
KETONES URINE: NEGATIVE MG/DL
LEUKOCYTE ESTERASE URINE: NEGATIVE
NITRITE URINE: NEGATIVE
PH URINE: 5.5
PHOSPHATE SERPL-MCNC: 7.1 MG/DL
POTASSIUM SERPL-SCNC: 5 MMOL/L
PROT UR-MCNC: 97 MG/DL
PROTEIN URINE: 100 MG/DL
SODIUM SERPL-SCNC: 136 MMOL/L
SPECIFIC GRAVITY URINE: 1.01
TIBC SERPL-MCNC: 234 UG/DL
UIBC SERPL-MCNC: 162 UG/DL
UROBILINOGEN URINE: 0.2 MG/DL

## 2024-12-10 ENCOUNTER — NON-APPOINTMENT (OUTPATIENT)
Age: 59
End: 2024-12-10

## 2024-12-10 ENCOUNTER — APPOINTMENT (OUTPATIENT)
Dept: NEPHROLOGY | Facility: CLINIC | Age: 59
End: 2024-12-10
Payer: MEDICAID

## 2024-12-10 VITALS
DIASTOLIC BLOOD PRESSURE: 72 MMHG | HEART RATE: 59 BPM | SYSTOLIC BLOOD PRESSURE: 195 MMHG | BODY MASS INDEX: 25.67 KG/M2 | HEIGHT: 57 IN | OXYGEN SATURATION: 98 % | WEIGHT: 119 LBS | TEMPERATURE: 98 F

## 2024-12-10 DIAGNOSIS — N18.4 CHRONIC KIDNEY DISEASE, STAGE 4 (SEVERE): ICD-10-CM

## 2024-12-10 PROCEDURE — 99214 OFFICE O/P EST MOD 30 MIN: CPT | Mod: 25,GC

## 2024-12-10 PROCEDURE — 96372 THER/PROPH/DIAG INJ SC/IM: CPT | Mod: GC

## 2024-12-10 RX ORDER — ERYTHROPOIETIN 10000 [IU]/ML
10000 INJECTION, SOLUTION INTRAVENOUS; SUBCUTANEOUS
Qty: 1 | Refills: 0 | Status: COMPLETED | OUTPATIENT
Start: 2024-12-10

## 2024-12-10 RX ORDER — HYDRALAZINE HYDROCHLORIDE 25 MG/1
25 TABLET ORAL TWICE DAILY
Qty: 180 | Refills: 1 | Status: ACTIVE | COMMUNITY
Start: 2024-12-10 | End: 1900-01-01

## 2024-12-10 RX ORDER — ERYTHROPOIETIN 10000 [IU]/ML
10000 INJECTION, SOLUTION INTRAVENOUS; SUBCUTANEOUS
Qty: 1 | Refills: 0 | Status: ACTIVE | COMMUNITY
Start: 2024-12-10 | End: 1900-01-01

## 2024-12-10 RX ADMIN — ERYTHROPOIETIN 0 UNIT/ML: 10000 INJECTION, SOLUTION INTRAVENOUS; SUBCUTANEOUS at 00:00

## 2024-12-26 ENCOUNTER — APPOINTMENT (OUTPATIENT)
Dept: TRANSPLANT | Facility: CLINIC | Age: 59
End: 2024-12-26

## 2025-01-13 DIAGNOSIS — N18.4 CHRONIC KIDNEY DISEASE, STAGE 4 (SEVERE): ICD-10-CM

## 2025-01-13 RX ORDER — TORSEMIDE 40 MG/1
40 TABLET, FILM COATED ORAL
Qty: 90 | Refills: 3 | Status: ACTIVE | COMMUNITY
Start: 2025-01-13 | End: 1900-01-01

## 2025-01-21 ENCOUNTER — APPOINTMENT (OUTPATIENT)
Dept: TRANSPLANT | Facility: CLINIC | Age: 60
End: 2025-01-21

## 2025-01-29 RX ORDER — TORSEMIDE 20 MG/1
20 TABLET ORAL DAILY
Qty: 180 | Refills: 3 | Status: ACTIVE | COMMUNITY
Start: 2025-01-29 | End: 1900-01-01

## 2025-02-10 ENCOUNTER — APPOINTMENT (OUTPATIENT)
Dept: SURGERY | Facility: CLINIC | Age: 60
End: 2025-02-10

## 2025-02-11 ENCOUNTER — NON-APPOINTMENT (OUTPATIENT)
Age: 60
End: 2025-02-11

## 2025-02-11 ENCOUNTER — APPOINTMENT (OUTPATIENT)
Dept: NEPHROLOGY | Facility: CLINIC | Age: 60
End: 2025-02-11
Payer: MEDICAID

## 2025-02-11 VITALS
WEIGHT: 121 LBS | HEART RATE: 70 BPM | TEMPERATURE: 98.2 F | SYSTOLIC BLOOD PRESSURE: 150 MMHG | BODY MASS INDEX: 26.1 KG/M2 | DIASTOLIC BLOOD PRESSURE: 78 MMHG | OXYGEN SATURATION: 97 % | HEIGHT: 57 IN

## 2025-02-11 DIAGNOSIS — D63.1 CHRONIC KIDNEY DISEASE, UNSPECIFIED: ICD-10-CM

## 2025-02-11 DIAGNOSIS — N18.4 CHRONIC KIDNEY DISEASE, STAGE 4 (SEVERE): ICD-10-CM

## 2025-02-11 DIAGNOSIS — N18.9 CHRONIC KIDNEY DISEASE, UNSPECIFIED: ICD-10-CM

## 2025-02-11 PROCEDURE — 96372 THER/PROPH/DIAG INJ SC/IM: CPT

## 2025-02-11 PROCEDURE — 99215 OFFICE O/P EST HI 40 MIN: CPT | Mod: 25

## 2025-02-11 RX ADMIN — ERYTHROPOIETIN 0 UNIT/ML: 10000 INJECTION, SOLUTION INTRAVENOUS; SUBCUTANEOUS at 00:00

## 2025-02-14 RX ORDER — ERYTHROPOIETIN 10000 [IU]/ML
10000 INJECTION, SOLUTION INTRAVENOUS; SUBCUTANEOUS
Qty: 1 | Refills: 0 | Status: COMPLETED | OUTPATIENT
Start: 2025-02-11 | End: 2025-02-13

## 2025-02-20 ENCOUNTER — INPATIENT (INPATIENT)
Facility: HOSPITAL | Age: 60
LOS: 11 days | Discharge: HOME | DRG: 641 | End: 2025-03-04
Attending: INTERNAL MEDICINE | Admitting: INTERNAL MEDICINE
Payer: MEDICAID

## 2025-02-20 VITALS
SYSTOLIC BLOOD PRESSURE: 135 MMHG | HEIGHT: 59 IN | WEIGHT: 119.05 LBS | HEART RATE: 88 BPM | DIASTOLIC BLOOD PRESSURE: 73 MMHG | RESPIRATION RATE: 20 BRPM | TEMPERATURE: 98 F | OXYGEN SATURATION: 96 %

## 2025-02-20 DIAGNOSIS — Z90.49 ACQUIRED ABSENCE OF OTHER SPECIFIED PARTS OF DIGESTIVE TRACT: Chronic | ICD-10-CM

## 2025-02-20 DIAGNOSIS — E87.1 HYPO-OSMOLALITY AND HYPONATREMIA: ICD-10-CM

## 2025-02-20 DIAGNOSIS — Z98.51 TUBAL LIGATION STATUS: Chronic | ICD-10-CM

## 2025-02-20 LAB
ALBUMIN SERPL ELPH-MCNC: 4.2 G/DL — SIGNIFICANT CHANGE UP (ref 3.3–5)
ALP SERPL-CCNC: 109 U/L — SIGNIFICANT CHANGE UP (ref 40–120)
ALT FLD-CCNC: 14 U/L — SIGNIFICANT CHANGE UP (ref 10–45)
ANION GAP SERPL CALC-SCNC: 29 MMOL/L — HIGH (ref 5–17)
APPEARANCE UR: ABNORMAL
APTT BLD: 34.9 SEC — SIGNIFICANT CHANGE UP (ref 24.5–35.6)
AST SERPL-CCNC: 22 U/L — SIGNIFICANT CHANGE UP (ref 10–40)
BACTERIA # UR AUTO: NEGATIVE /HPF — SIGNIFICANT CHANGE UP
BASOPHILS # BLD AUTO: 0.01 K/UL — SIGNIFICANT CHANGE UP (ref 0–0.2)
BASOPHILS NFR BLD AUTO: 0.1 % — SIGNIFICANT CHANGE UP (ref 0–2)
BILIRUB SERPL-MCNC: 0.5 MG/DL — SIGNIFICANT CHANGE UP (ref 0.2–1.2)
BILIRUB UR-MCNC: NEGATIVE — SIGNIFICANT CHANGE UP
BUN SERPL-MCNC: 101 MG/DL — HIGH (ref 7–23)
CALCIUM SERPL-MCNC: 6.7 MG/DL — LOW (ref 8.4–10.5)
CAST: 1 /LPF — SIGNIFICANT CHANGE UP (ref 0–4)
CHLORIDE SERPL-SCNC: 74 MMOL/L — LOW (ref 96–108)
CK MB BLD-MCNC: 0.5 % — SIGNIFICANT CHANGE UP (ref 0–3.5)
CK MB CFR SERPL CALC: 3.1 NG/ML — SIGNIFICANT CHANGE UP (ref 0–3.8)
CK SERPL-CCNC: 635 U/L — HIGH (ref 25–170)
CO2 SERPL-SCNC: 15 MMOL/L — LOW (ref 22–31)
COLOR SPEC: YELLOW — SIGNIFICANT CHANGE UP
CREAT ?TM UR-MCNC: 80 MG/DL — SIGNIFICANT CHANGE UP
CREAT SERPL-MCNC: 10.62 MG/DL — HIGH (ref 0.5–1.3)
D DIMER BLD IA.RAPID-MCNC: 500 NG/ML DDU — HIGH
DIFF PNL FLD: ABNORMAL
EGFR: 4 ML/MIN/1.73M2 — LOW
EGFR: 4 ML/MIN/1.73M2 — LOW
EOSINOPHIL # BLD AUTO: 0.02 K/UL — SIGNIFICANT CHANGE UP (ref 0–0.5)
EOSINOPHIL NFR BLD AUTO: 0.2 % — SIGNIFICANT CHANGE UP (ref 0–6)
GAS PNL BLDV: SIGNIFICANT CHANGE UP
GLUCOSE SERPL-MCNC: 133 MG/DL — HIGH (ref 70–99)
GLUCOSE UR QL: NEGATIVE MG/DL — SIGNIFICANT CHANGE UP
HBV SURFACE AG SER-ACNC: SIGNIFICANT CHANGE UP
HCT VFR BLD CALC: 23 % — LOW (ref 34.5–45)
HGB BLD-MCNC: 8.4 G/DL — LOW (ref 11.5–15.5)
IMM GRANULOCYTES NFR BLD AUTO: 0.6 % — SIGNIFICANT CHANGE UP (ref 0–0.9)
INR BLD: 1.02 RATIO — SIGNIFICANT CHANGE UP (ref 0.85–1.16)
KETONES UR-MCNC: NEGATIVE MG/DL — SIGNIFICANT CHANGE UP
LEUKOCYTE ESTERASE UR-ACNC: NEGATIVE — SIGNIFICANT CHANGE UP
LYMPHOCYTES # BLD AUTO: 0.84 K/UL — LOW (ref 1–3.3)
LYMPHOCYTES # BLD AUTO: 7.6 % — LOW (ref 13–44)
MAGNESIUM SERPL-MCNC: 2.2 MG/DL — SIGNIFICANT CHANGE UP (ref 1.6–2.6)
MCHC RBC-ENTMCNC: 30.3 PG — SIGNIFICANT CHANGE UP (ref 27–34)
MCHC RBC-ENTMCNC: 36.5 G/DL — HIGH (ref 32–36)
MCV RBC AUTO: 83 FL — SIGNIFICANT CHANGE UP (ref 80–100)
MONOCYTES # BLD AUTO: 1.12 K/UL — HIGH (ref 0–0.9)
MONOCYTES NFR BLD AUTO: 10.2 % — SIGNIFICANT CHANGE UP (ref 2–14)
NEUTROPHILS # BLD AUTO: 8.94 K/UL — HIGH (ref 1.8–7.4)
NEUTROPHILS NFR BLD AUTO: 81.3 % — HIGH (ref 43–77)
NITRITE UR-MCNC: NEGATIVE — SIGNIFICANT CHANGE UP
NRBC BLD AUTO-RTO: 0 /100 WBCS — SIGNIFICANT CHANGE UP (ref 0–0)
NT-PROBNP SERPL-SCNC: HIGH PG/ML (ref 0–300)
OSMOLALITY UR: 228 MOS/KG — LOW (ref 300–900)
PH UR: 5.5 — SIGNIFICANT CHANGE UP (ref 5–8)
PLATELET # BLD AUTO: 195 K/UL — SIGNIFICANT CHANGE UP (ref 150–400)
POTASSIUM SERPL-MCNC: 3.7 MMOL/L — SIGNIFICANT CHANGE UP (ref 3.5–5.3)
POTASSIUM SERPL-SCNC: 3.7 MMOL/L — SIGNIFICANT CHANGE UP (ref 3.5–5.3)
POTASSIUM UR-SCNC: 26 MMOL/L — SIGNIFICANT CHANGE UP
PROT ?TM UR-MCNC: 357 MG/DL — HIGH (ref 0–12)
PROT SERPL-MCNC: 7.9 G/DL — SIGNIFICANT CHANGE UP (ref 6–8.3)
PROT UR-MCNC: >=1000 MG/DL
PROT/CREAT UR-RTO: 4.5 RATIO — HIGH (ref 0–0.2)
PROTHROM AB SERPL-ACNC: 11.6 SEC — SIGNIFICANT CHANGE UP (ref 9.9–13.4)
RBC # BLD: 2.77 M/UL — LOW (ref 3.8–5.2)
RBC # FLD: 13.3 % — SIGNIFICANT CHANGE UP (ref 10.3–14.5)
RBC CASTS # UR COMP ASSIST: 2 /HPF — SIGNIFICANT CHANGE UP (ref 0–4)
REVIEW: SIGNIFICANT CHANGE UP
SODIUM SERPL-SCNC: 118 MMOL/L — CRITICAL LOW (ref 135–145)
SODIUM UR-SCNC: 15 MMOL/L — SIGNIFICANT CHANGE UP
SP GR SPEC: 1.01 — SIGNIFICANT CHANGE UP (ref 1–1.03)
SQUAMOUS # UR AUTO: 1 /HPF — SIGNIFICANT CHANGE UP (ref 0–5)
TROPONIN T, HIGH SENSITIVITY RESULT: 55 NG/L — HIGH (ref 0–51)
TROPONIN T, HIGH SENSITIVITY RESULT: 56 NG/L — HIGH (ref 0–51)
UROBILINOGEN FLD QL: 0.2 MG/DL — SIGNIFICANT CHANGE UP (ref 0.2–1)
UUN UR-MCNC: 365 MG/DL — SIGNIFICANT CHANGE UP
WBC # BLD: 11 K/UL — HIGH (ref 3.8–10.5)
WBC # FLD AUTO: 11 K/UL — HIGH (ref 3.8–10.5)
WBC UR QL: 2 /HPF — SIGNIFICANT CHANGE UP (ref 0–5)

## 2025-02-20 PROCEDURE — 99223 1ST HOSP IP/OBS HIGH 75: CPT

## 2025-02-20 PROCEDURE — 99223 1ST HOSP IP/OBS HIGH 75: CPT | Mod: GC

## 2025-02-20 PROCEDURE — 71045 X-RAY EXAM CHEST 1 VIEW: CPT | Mod: 26

## 2025-02-20 PROCEDURE — 71275 CT ANGIOGRAPHY CHEST: CPT | Mod: 26

## 2025-02-20 PROCEDURE — 99285 EMERGENCY DEPT VISIT HI MDM: CPT

## 2025-02-20 PROCEDURE — 93308 TTE F-UP OR LMTD: CPT | Mod: 26

## 2025-02-20 RX ORDER — SODIUM BICARBONATE 1 MEQ/ML
650 SYRINGE (ML) INTRAVENOUS THREE TIMES A DAY
Refills: 0 | Status: DISCONTINUED | OUTPATIENT
Start: 2025-02-20 | End: 2025-03-04

## 2025-02-20 RX ORDER — HEPARIN SODIUM 1000 [USP'U]/ML
5000 INJECTION INTRAVENOUS; SUBCUTANEOUS EVERY 8 HOURS
Refills: 0 | Status: DISCONTINUED | OUTPATIENT
Start: 2025-02-20 | End: 2025-02-25

## 2025-02-20 RX ORDER — CALCIUM CARBONATE 750 MG/1
1 TABLET ORAL ONCE
Refills: 0 | Status: COMPLETED | OUTPATIENT
Start: 2025-02-20 | End: 2025-02-20

## 2025-02-20 RX ORDER — NIFEDIPINE 30 MG
60 TABLET, EXTENDED RELEASE 24 HR ORAL DAILY
Refills: 0 | Status: DISCONTINUED | OUTPATIENT
Start: 2025-02-20 | End: 2025-03-04

## 2025-02-20 RX ORDER — INFLUENZA A VIRUS A/IDAHO/07/2018 (H1N1) ANTIGEN (MDCK CELL DERIVED, PROPIOLACTONE INACTIVATED, INFLUENZA A VIRUS A/INDIANA/08/2018 (H3N2) ANTIGEN (MDCK CELL DERIVED, PROPIOLACTONE INACTIVATED), INFLUENZA B VIRUS B/SINGAPORE/INFTT-16-0610/2016 ANTIGEN (MDCK CELL DERIVED, PROPIOLACTONE INACTIVATED), INFLUENZA B VIRUS B/IOWA/06/2017 ANTIGEN (MDCK CELL DERIVED, PROPIOLACTONE INACTIVATED) 15; 15; 15; 15 UG/.5ML; UG/.5ML; UG/.5ML; UG/.5ML
0.5 INJECTION, SUSPENSION INTRAMUSCULAR ONCE
Refills: 0 | Status: COMPLETED | OUTPATIENT
Start: 2025-02-20 | End: 2025-02-20

## 2025-02-20 RX ORDER — SODIUM BICARBONATE 1 MEQ/ML
1 SYRINGE (ML) INTRAVENOUS
Refills: 0 | DISCHARGE

## 2025-02-20 RX ORDER — FUROSEMIDE 10 MG/ML
80 INJECTION INTRAMUSCULAR; INTRAVENOUS
Refills: 0 | Status: DISCONTINUED | OUTPATIENT
Start: 2025-02-20 | End: 2025-03-01

## 2025-02-20 RX ORDER — FERROUS SULFATE 137(45) MG
325 TABLET, EXTENDED RELEASE ORAL DAILY
Refills: 0 | Status: DISCONTINUED | OUTPATIENT
Start: 2025-02-20 | End: 2025-03-04

## 2025-02-20 RX ORDER — FUROSEMIDE 10 MG/ML
80 INJECTION INTRAMUSCULAR; INTRAVENOUS ONCE
Refills: 0 | Status: COMPLETED | OUTPATIENT
Start: 2025-02-20 | End: 2025-02-20

## 2025-02-20 RX ORDER — ACETAMINOPHEN 500 MG/5ML
975 LIQUID (ML) ORAL ONCE
Refills: 0 | Status: COMPLETED | OUTPATIENT
Start: 2025-02-20 | End: 2025-02-20

## 2025-02-20 RX ORDER — ASPIRIN 325 MG
324 TABLET ORAL ONCE
Refills: 0 | Status: COMPLETED | OUTPATIENT
Start: 2025-02-20 | End: 2025-02-20

## 2025-02-20 RX ORDER — CALCIUM GLUCONATE 20 MG/ML
2 INJECTION, SOLUTION INTRAVENOUS ONCE
Refills: 0 | Status: COMPLETED | OUTPATIENT
Start: 2025-02-20 | End: 2025-02-20

## 2025-02-20 RX ADMIN — HEPARIN SODIUM 5000 UNIT(S): 1000 INJECTION INTRAVENOUS; SUBCUTANEOUS at 22:08

## 2025-02-20 RX ADMIN — Medication 975 MILLIGRAM(S): at 09:06

## 2025-02-20 RX ADMIN — FUROSEMIDE 80 MILLIGRAM(S): 10 INJECTION INTRAMUSCULAR; INTRAVENOUS at 22:50

## 2025-02-20 RX ADMIN — Medication 325 MILLIGRAM(S): at 18:08

## 2025-02-20 RX ADMIN — Medication 60 MILLIGRAM(S): at 22:56

## 2025-02-20 RX ADMIN — Medication 324 MILLIGRAM(S): at 09:08

## 2025-02-20 RX ADMIN — Medication 650 MILLIGRAM(S): at 22:08

## 2025-02-20 RX ADMIN — CALCIUM CARBONATE 1 TABLET(S): 750 TABLET ORAL at 22:08

## 2025-02-20 RX ADMIN — FUROSEMIDE 80 MILLIGRAM(S): 10 INJECTION INTRAMUSCULAR; INTRAVENOUS at 13:34

## 2025-02-20 RX ADMIN — Medication 25 MILLIGRAM(S): at 18:08

## 2025-02-20 RX ADMIN — CALCIUM GLUCONATE 200 GRAM(S): 20 INJECTION, SOLUTION INTRAVENOUS at 22:17

## 2025-02-20 NOTE — ED PROVIDER NOTE - NSICDXPASTMEDICALHX_GEN_ALL_CORE_FT
PAST MEDICAL HISTORY:  Colon cancer screening     H/O bleeding following renal biopsy     H/O transfusion of whole blood     Hyperlipidemia     Hypertension     Proteinuria     Stage 5 chronic kidney disease not on chronic dialysis     Uses Burkinan as primary spoken language

## 2025-02-20 NOTE — ED PROVIDER NOTE - CLINICAL SUMMARY MEDICAL DECISION MAKING FREE TEXT BOX
Attending note.  Patient was seen in room #27.  Patient complaining of intermittent pulsating chest pain in the right precordium for the last 2 days.  Patient also complaining of shortness of breath with is worse when laying down.  She is complaining of cramping in the legs so patient stopped her torsemide yesterday.  She has a history of CKD (autoimmune glomerulonephritis) and is scheduled for consultation for dialysis next week.  She also has a history of hypertension and hyperlipidemia and anemia.  She is currently taking iron, hydralazine, nifedipine, torsemide and bicarb.  Her nephrologist is Dr. Yossi Palma.  She denies any fever or cough.  She denies any recent travel or history of PE/DVT.  She has no known or allergies.  Patient has been voiding normally.   is used for history and physical examination.     ROS-as above, otherwise negative.  PE-patient is alert in mild respiratory distress.  Skin is warm and dry.  There is no pallor.  Patient has JVD and HJR.  Lungs are clear without wheezes, rales or rhonchi.  Heart is regular rhythm.  Abdomen is soft, nontender nondistended.  There is no CVA tenderness.  Patient has 3+ pitting edema of the ankles bilaterally.  There is no calf tenderness.  Neurologic examination grossly intact.     A/P-patient with right precordial chest pain which is described as pulsating along with shortness of breath and orthopnea and increasing leg edema.  Patient did not take her torsemide yesterday as it was causing leg cramps.  Point-of-care ultrasound does not show B-lines or other signs of fluid overload.  Patient does have JVD.  Differential not limited to ACS versus PE versus pneumonia.   Labs including proBNP, troponin, D-dimer, chest x-ray, EKG, cardiac monitor.  Aspirin, Tylenol. Attending note.  Patient was seen in room #27.  Patient complaining of intermittent pulsating chest pain in the right precordium for the last 2 days.  Patient also complaining of shortness of breath with is worse when laying down.  She is complaining of cramping in the legs so patient stopped her torsemide yesterday.  She has a history of CKD (autoimmune glomerulonephritis) and is scheduled for consultation for dialysis next week.  She also has a history of hypertension and hyperlipidemia and anemia.  She is currently taking iron, hydralazine, nifedipine, torsemide and bicarb.  Her nephrologist is Dr. Yossi Palma.  She denies any fever or cough.  She denies any recent travel or history of PE/DVT.  She has no known or allergies.  Patient has been voiding normally.   is used for history and physical examination.  PCP - Aliza Correa MD   - [facility]. Daughter states pt primarily cared for by Dr. Palma.      ROS-as above, otherwise negative.  PE-patient is alert in mild respiratory distress.  Skin is warm and dry.  There is no pallor.  Patient has JVD and HJR.  Lungs are clear without wheezes, rales or rhonchi.  Heart is regular rhythm.  Abdomen is soft, nontender nondistended.  There is no CVA tenderness.  Patient has 3+ pitting edema of the ankles bilaterally.  There is no calf tenderness.  Neurologic examination grossly intact.     A/P-patient with right precordial chest pain which is described as pulsating along with shortness of breath and orthopnea and increasing leg edema.  Patient did not take her torsemide yesterday as it was causing leg cramps.  Point-of-care ultrasound does not show B-lines or other signs of fluid overload.  Patient does have JVD.  Differential not limited to ACS versus PE versus pneumonia.   Labs including proBNP, troponin, D-dimer, chest x-ray, EKG, cardiac monitor.  Aspirin, Tylenol.

## 2025-02-20 NOTE — CONSULT NOTE ADULT - ASSESSMENT
59F with HTN and advanced CKD stage 5 p/w chest pain. Nephrology consulted for CKD management.    #CKD stage 5 with uremic s/s and volume overload  #Hyponatremia with low urine Na and elevated Uosm, likely due to volume overload and possibly component of ADH stimulation triggered by pain  - Pt with elevated BNP, LE edema, and JVD on exam  - CTA chest negative for PE  - Recommend Lasix 80mg IVP BID  - Pt will likely need to be initiated on iHD this admission. Consent obtained. Would consult IR for PermCath placement, as well surgery for PD cath placement so pt can be transitioned to PD as an outpatient.    #Hypocalcemia likely contributing to leg cramps  - Please replete calcium, especially while giving loop diuretics      Lester Metcalf, PGY-5  Nephrology Fellow  MS TEAMS preferred  (After 5pm or on weekends, please contact the fellow on call).  59F with HTN and advanced CKD stage 5 p/w chest pain. Nephrology consulted for CKD management.    #CKD stage 5 with uremic s/s and volume overload  #Hyponatremia with low urine Na and elevated Uosm, likely due to volume overload and possibly component of ADH stimulation triggered by pain  - Pt with elevated BNP, LE edema, and JVD on exam  - CTA chest negative for PE  - Recommend Lasix 80mg IVP BID  - Pt will likely need to be initiated on iHD this admission. Consent obtained. Would consult IR for PermCath placement, as well surgery (Dr. Frausto) for PD cath placement so pt can be transitioned to PD as an outpatient.    #Hypocalcemia likely contributing to leg cramps  - Please replete calcium, especially while giving loop diuretics  - check serum phosphorus level.       Lester Metcalf, PGY-5  Nephrology Fellow  MS TEAMS preferred  (After 5pm or on weekends, please contact the fellow on call).

## 2025-02-20 NOTE — ED ADULT NURSE NOTE - OBJECTIVE STATEMENT
59y Female primarily Faroese speaking with daughter at bedside PMHx HTN, autoimmune glomerulonephritis CKD, HLD, and anemia presenting to ED via walk-in triage for chest pain x 2 days. Pt states she's having pulsating R sided chest pain that has been constant x 2 days a/w SOB worse with laying down. Pt with +3 pitting edema in b/l LE and notable JVD. Pt states she was placed on Torsemide this past Monday however hasn't had an improvement. Pt follow [facility] nephrologist Dr. Yossi Palma and was supposed to go in next week to initiate HD process.  Pt denies any fever or cough, recent travel or history of PE/DVT.  Pt states she still makes urine and has seen no changes to her urinary output. IV placed, labs drawn and sent, seen and eval by MD. Wyalon in lowest position and locked, call bell within reach.

## 2025-02-20 NOTE — PATIENT PROFILE ADULT - FALL HARM RISK - HARM RISK INTERVENTIONS

## 2025-02-20 NOTE — CONSULT NOTE ADULT - ASSESSMENT
Consult reason: Hyponatremia    HPI: 59F w/ PMHx Ig Nephropathy, ESRD (not on HD), undergoing renal TP eval p/w fatigue, chest pain. Patient states that starting this past monday, she began to take Torsemide, and states that she quickly began to feel fatigued and subsequently began to experience chest discomfort. Symptoms persisted which prompted her to seek care. Of note patient states that she is currently undergoing evaluation for possibly beginning HD as outpatient.    Patient arrived to ED w/ VSS, afebrile, satting well on RA, CXR with clear lungs. Labs showing Na 118, K 3.7, , SCr 10.62, BNP 15k.    ASSESSMENT: 59F w/ PMHx Ig Nephropathy, ESRD (not on HD), undergoing renal TP eval p/w hypontremia.    PLAN:  - Patient currently asymptomatic from hyponatremia, AOx3, daughter denies patient being confused, no seizures, no gait disturbances.  - Recommend Nephro consult, Urine studies  - Patient does not appear to currently have indications for emergent HD  - f/u Nephro recs, if recommendation for emergent HD, please reconsult MICU. Not MICU candidate at this time.

## 2025-02-20 NOTE — ED PROVIDER NOTE - PROGRESS NOTE DETAILS
Daniel Perry MD PGY-3: POCUS no b-lines, no pleural effusion on right, good heart squeeze, no pericardial effusion, IVR 1.9cm mild to mod resp variation Daniel Perry MD PGY-3: spoke with nephrology about patient who is also undergoing transplant eval. They were aware of patient. Will come see. Asked to hold off on contrast studies until nephro sees patient. Daniel Perry MD PGY-3: MICU consulted for Na 118. Nephro updated and states they spoke to their attending and ok for contrast if needed since the patient needs dialysis. d/w Nephrology fellow - feel pt is fluid over loaded and requesting pt be given Lasix 80mg IV - ordered. Daniel Perry MD PGY-3: discussed with MICU, rejected. Discussed with nephro no need for urgent dialysis but likely will require this hospitalization. Pt ok to admit to floors

## 2025-02-20 NOTE — H&P ADULT - NSICDXPASTMEDICALHX_GEN_ALL_CORE_FT
PAST MEDICAL HISTORY:  Colon cancer screening     H/O bleeding following renal biopsy     H/O transfusion of whole blood     Hyperlipidemia     Hypertension     Proteinuria     Stage 5 chronic kidney disease not on chronic dialysis     Uses Monegasque as primary spoken language

## 2025-02-20 NOTE — H&P ADULT - NSHPLABSRESULTS_GEN_ALL_CORE
LABS:                        8.4    11.00 )-----------( 195      ( 2025 08:47 )             23.0         118[LL]  |  74[L]  |  101[H]  ----------------------------<  133[H]  3.7   |  15[L]  |  10.62[H]    Ca    6.7[L]      2025 08:47  Mg     2.2         TPro  7.9  /  Alb  4.2  /  TBili  0.5  /  DBili  x   /  AST  22  /  ALT  14  /  AlkPhos  109      PT/INR - ( 2025 08:47 )   PT: 11.6 sec;   INR: 1.02 ratio         PTT - ( 2025 08:47 )  PTT:34.9 sec  Urinalysis Basic - ( 2025 10:15 )    Color: Yellow / Appearance: Cloudy / S.012 / pH: x  Gluc: x / Ketone: Negative mg/dL  / Bili: Negative / Urobili: 0.2 mg/dL   Blood: x / Protein: >=1000 mg/dL / Nitrite: Negative   Leuk Esterase: Negative / RBC: 2 /HPF / WBC 2 /HPF   Sq Epi: x / Non Sq Epi: 1 /HPF / Bacteria: Negative /HPF        CPK Mass Assay %: 0.5 % (25 @ 08:47)      RADIOLOGY & ADDITIONAL TESTS:

## 2025-02-20 NOTE — H&P ADULT - ASSESSMENT
58 y/o female with pmh of CKD 5 , IGA nephropathy complaining of intermittent pulsating chest pain in the right precordium for the last 2 days.  Patient also complaining of shortness of breath with is worse when laying down.  She is complaining of cramping in the legs so patient stopped her torsemide yesterday.  She has a history of CKD (autoimmune glomerulonephritis) and is scheduled for consultation for dialysis next week.  She also has a history of hypertension and hyperlipidemia and anemia.  She is currently taking iron, hydralazine, nifedipine, torsemide and bicarb.  Her nephrologist is Dr. Yossi Palma.  She denies any fever or cough.  She denies any recent travel or history of PE/DVT.  She has no known or allergies.  Patient has been voiding normally.   is used for history and physical examination.  PCP - Aliza Correa MD   - [facility]. Daughter states pt primarily cared for by Dr. Palma.     CKD stage 5 with uremic s/s and volume overload  Hyponatremia with low urine Na and elevated Uosm, likely due to volume overload and possibly component of ADH stimulation triggered by pain  - Pt with elevated BNP, LE edema, and JVD on exam  - CTA chest negative for PE  - Recommend Lasix 80mg IVP BID  - Pt will likely need to be initiated on iHD this admission. Consent obtained. Would consult IR for PermCath placement, as well surgery (Dr. Frausto) for PD cath placement so pt can be transitioned to PD as an outpatient.    Hypocalcemia likely contributing to leg cramps  - Please replete calcium, especially while giving loop diuretics  - check serum phosphorus level.     dvt px  - sq heparin   60 y/o female with pmh of CKD 5 , IGA nephropathy complaining of intermittent pulsating chest pain in the right precordium for the last 2 days.  Patient also complaining of shortness of breath with is worse when laying down.  She is complaining of cramping in the legs so patient stopped her torsemide yesterday.  She has a history of CKD (autoimmune glomerulonephritis) and is scheduled for consultation for dialysis next week.  She also has a history of hypertension and hyperlipidemia and anemia.  She is currently taking iron, hydralazine, nifedipine, torsemide and bicarb.  Her nephrologist is Dr. Yossi Palma.  She denies any fever or cough.  She denies any recent travel or history of PE/DVT.  She has no known or allergies.  Patient has been voiding normally.   is used for history and physical examination.  PCP - Aliza Correa MD   - [facility]. Daughter states pt primarily cared for by Dr. Palma.     CKD stage 5 with uremic s/s and volume overload  Hyponatremia with low urine Na and elevated Uosm, likely due to volume overload and possibly component of ADH stimulation triggered by pain  - Pt with elevated BNP, LE edema, and JVD on exam  - CTA chest negative for PE  - Recommend Lasix 80mg IVP BID  - Pt will likely need to be initiated on iHD this admission. Consent obtained. Would consult IR for PermCath placement, as well surgery (Dr. Frausto) for PD cath placement so pt can be transitioned to PD as an outpatient.    Hypocalcemia likely contributing to leg cramps  - Please replete calcium, especially while giving loop diuretics  - check serum phosphorus level.     HTN  - c/w home meds    anemia   - iron studies      dvt px  - sq heparin

## 2025-02-20 NOTE — H&P ADULT - HISTORY OF PRESENT ILLNESS
60 y/o female with pmh of CKD 5 , IGA nephropathy complaining of intermittent pulsating chest pain in the right precordium for the last 2 days.  Patient also complaining of shortness of breath with is worse when laying down.  She is complaining of cramping in the legs so patient stopped her torsemide yesterday.  She has a history of CKD (autoimmune glomerulonephritis) and is scheduled for consultation for dialysis next week.  She also has a history of hypertension and hyperlipidemia and anemia.  She is currently taking iron, hydralazine, nifedipine, torsemide and bicarb.  Her nephrologist is Dr. Yossi Palma.  She denies any fever or cough.  She denies any recent travel or history of PE/DVT.  She has no known or allergies.  Patient has been voiding normally.   is used for history and physical examination.  PCP - Aliza Correa MD   - [facility]. Daughter states pt primarily cared for by Dr. Palma.

## 2025-02-21 LAB
ANION GAP SERPL CALC-SCNC: 26 MMOL/L — HIGH (ref 5–17)
ANION GAP SERPL CALC-SCNC: 29 MMOL/L — HIGH (ref 5–17)
BUN SERPL-MCNC: 104 MG/DL — HIGH (ref 7–23)
BUN SERPL-MCNC: 99 MG/DL — HIGH (ref 7–23)
CALCIUM SERPL-MCNC: 6.6 MG/DL — LOW (ref 8.4–10.5)
CALCIUM SERPL-MCNC: 6.6 MG/DL — LOW (ref 8.4–10.5)
CHLORIDE SERPL-SCNC: 76 MMOL/L — LOW (ref 96–108)
CHLORIDE SERPL-SCNC: 77 MMOL/L — LOW (ref 96–108)
CO2 SERPL-SCNC: 16 MMOL/L — LOW (ref 22–31)
CO2 SERPL-SCNC: 17 MMOL/L — LOW (ref 22–31)
CREAT SERPL-MCNC: 10.77 MG/DL — HIGH (ref 0.5–1.3)
CREAT SERPL-MCNC: 10.89 MG/DL — HIGH (ref 0.5–1.3)
EGFR: 4 ML/MIN/1.73M2 — LOW
FERRITIN SERPL-MCNC: 935 NG/ML — HIGH (ref 13–330)
FOLATE SERPL-MCNC: 10.7 NG/ML — SIGNIFICANT CHANGE UP
GLUCOSE SERPL-MCNC: 102 MG/DL — HIGH (ref 70–99)
GLUCOSE SERPL-MCNC: 106 MG/DL — HIGH (ref 70–99)
HCT VFR BLD CALC: 21.4 % — LOW (ref 34.5–45)
HGB BLD-MCNC: 7.6 G/DL — LOW (ref 11.5–15.5)
IRON SATN MFR SERPL: 11 % — LOW (ref 14–50)
IRON SATN MFR SERPL: 22 UG/DL — LOW (ref 30–160)
MAGNESIUM SERPL-MCNC: 2.2 MG/DL — SIGNIFICANT CHANGE UP (ref 1.6–2.6)
MAGNESIUM SERPL-MCNC: 2.2 MG/DL — SIGNIFICANT CHANGE UP (ref 1.6–2.6)
MCHC RBC-ENTMCNC: 29.5 PG — SIGNIFICANT CHANGE UP (ref 27–34)
MCHC RBC-ENTMCNC: 35.5 G/DL — SIGNIFICANT CHANGE UP (ref 32–36)
MCV RBC AUTO: 82.9 FL — SIGNIFICANT CHANGE UP (ref 80–100)
NRBC BLD AUTO-RTO: 0 /100 WBCS — SIGNIFICANT CHANGE UP (ref 0–0)
OSMOLALITY SERPL: 289 MOSMOL/KG — SIGNIFICANT CHANGE UP (ref 275–300)
PHOSPHATE SERPL-MCNC: 11 MG/DL — HIGH (ref 2.5–4.5)
PHOSPHATE SERPL-MCNC: 11 MG/DL — HIGH (ref 2.5–4.5)
PLATELET # BLD AUTO: 157 K/UL — SIGNIFICANT CHANGE UP (ref 150–400)
POTASSIUM SERPL-MCNC: 3.6 MMOL/L — SIGNIFICANT CHANGE UP (ref 3.5–5.3)
POTASSIUM SERPL-MCNC: 3.9 MMOL/L — SIGNIFICANT CHANGE UP (ref 3.5–5.3)
POTASSIUM SERPL-SCNC: 3.6 MMOL/L — SIGNIFICANT CHANGE UP (ref 3.5–5.3)
POTASSIUM SERPL-SCNC: 3.9 MMOL/L — SIGNIFICANT CHANGE UP (ref 3.5–5.3)
RBC # BLD: 2.58 M/UL — LOW (ref 3.8–5.2)
RBC # FLD: 13.4 % — SIGNIFICANT CHANGE UP (ref 10.3–14.5)
SODIUM SERPL-SCNC: 120 MMOL/L — CRITICAL LOW (ref 135–145)
SODIUM SERPL-SCNC: 121 MMOL/L — LOW (ref 135–145)
TIBC SERPL-MCNC: 209 UG/DL — LOW (ref 220–430)
TSH SERPL-MCNC: 1.93 UIU/ML — SIGNIFICANT CHANGE UP (ref 0.27–4.2)
UIBC SERPL-MCNC: 187 UG/DL — SIGNIFICANT CHANGE UP (ref 110–370)
VIT B12 SERPL-MCNC: 1306 PG/ML — HIGH (ref 232–1245)
WBC # BLD: 10.49 K/UL — SIGNIFICANT CHANGE UP (ref 3.8–10.5)
WBC # FLD AUTO: 10.49 K/UL — SIGNIFICANT CHANGE UP (ref 3.8–10.5)

## 2025-02-21 PROCEDURE — 76937 US GUIDE VASCULAR ACCESS: CPT | Mod: 26

## 2025-02-21 PROCEDURE — 77001 FLUOROGUIDE FOR VEIN DEVICE: CPT | Mod: 26

## 2025-02-21 PROCEDURE — 36556 INSERT NON-TUNNEL CV CATH: CPT

## 2025-02-21 PROCEDURE — 99233 SBSQ HOSP IP/OBS HIGH 50: CPT | Mod: GC

## 2025-02-21 RX ADMIN — HEPARIN SODIUM 5000 UNIT(S): 1000 INJECTION INTRAVENOUS; SUBCUTANEOUS at 05:37

## 2025-02-21 RX ADMIN — FUROSEMIDE 80 MILLIGRAM(S): 10 INJECTION INTRAMUSCULAR; INTRAVENOUS at 05:35

## 2025-02-21 RX ADMIN — Medication 650 MILLIGRAM(S): at 21:41

## 2025-02-21 RX ADMIN — Medication 650 MILLIGRAM(S): at 05:37

## 2025-02-21 RX ADMIN — Medication 60 MILLIGRAM(S): at 05:37

## 2025-02-21 RX ADMIN — Medication 25 MILLIGRAM(S): at 05:37

## 2025-02-21 RX ADMIN — FUROSEMIDE 80 MILLIGRAM(S): 10 INJECTION INTRAMUSCULAR; INTRAVENOUS at 14:51

## 2025-02-21 RX ADMIN — Medication 325 MILLIGRAM(S): at 14:51

## 2025-02-21 RX ADMIN — HEPARIN SODIUM 5000 UNIT(S): 1000 INJECTION INTRAVENOUS; SUBCUTANEOUS at 21:41

## 2025-02-21 RX ADMIN — Medication 650 MILLIGRAM(S): at 14:51

## 2025-02-21 NOTE — PROGRESS NOTE ADULT - ASSESSMENT
59F with HTN and advanced CKD stage 5 (biopsy-proven IgA nephropathy in 2019) p/w chest pain. Nephrology consulted for CKD management.    #CKD stage 5 with uremic s/s and volume overload  #Hyponatremia with low urine Na and elevated Uosm, likely due to volume overload and possibly component of ADH stimulation triggered by pain  - Pt with elevated BNP, LE edema, and JVD on exam  - CTA chest negative for PE  - Continue Lasix 80mg IVP BID. If hypotension is a concern, hold Hydralazine or Nifedipine  - SNa improved from 118 to 121 with diuretics. Would give 100cc bolus of 2% saline with Lasix IV.  - Pt will likely need to be initiated on iHD this admission. Consent obtained. Would consult IR for PermCath placement, as well surgery (Dr. Frausto) for PD cath placement so pt can be transitioned to PD as an outpatient.    #Hypocalcemia likely contributing to leg cramps  #Hyperphosphatemia iso advanced CKD  - Ca 6.6, Phos 11. Check PTH and vitamin D levels  - Start calcium acetate 1334mg TID with meals  - Continue to replete calcium with calcium gluconate as well, especially while giving loop diuretics    #Anemia  - Hgb 7.6 today  - Check iron studies and ferritin. May benefit from IV iron and/or JYOTSNA      Lester Metcalf, PGY-5  Nephrology Fellow  MS TEAMS preferred  (After 5pm or on weekends, please contact the fellow on call).  yes...

## 2025-02-21 NOTE — CHART NOTE - NSCHARTNOTEFT_GEN_A_CORE
Pt is scheduled for PD cath placement on 2/24, but will need to be dialyzed prior to then in order to be optimized for procedure.   Pt remains volume overloaded and severely hyponatremic despite IV diuretics and has uremic s/s.   Therefore, would recommend non-tunneled dialysis catheter placement today either by IR or Vascular in order to initiate dialysis today. Pt is scheduled for PD cath placement on 2/24, but will need to be dialyzed prior to then in order to be optimized for procedure.   Pt remains volume overloaded and severely hyponatremic despite IV diuretics and has uremic s/s.   Therefore, would recommend non-tunneled dialysis catheter placement today either by IR or Vascular in order to initiate dialysis today.  Appreciate INES Metcalf, PGY-5  Nephrology Fellow  MS TEAMS preferred  (After 5pm or on weekends, please contact the fellow on call).

## 2025-02-21 NOTE — PROGRESS NOTE ADULT - ASSESSMENT
60 y/o female with pmh of CKD 5 , IGA nephropathy complaining of intermittent pulsating chest pain in the right precordium for the last 2 days.  Patient also complaining of shortness of breath with is worse when laying down.  She is complaining of cramping in the legs so patient stopped her torsemide yesterday.  She has a history of CKD (autoimmune glomerulonephritis) and is scheduled for consultation for dialysis next week.  She also has a history of hypertension and hyperlipidemia and anemia.  She is currently taking iron, hydralazine, nifedipine, torsemide and bicarb.  Her nephrologist is Dr. Yossi Palma.  She denies any fever or cough.  She denies any recent travel or history of PE/DVT.  She has no known or allergies.  Patient has been voiding normally.   is used for history and physical examination.  PCP - Aliza Correa MD   - [facility]. Daughter states pt primarily cared for by Dr. Palma.     CKD stage 5 with uremic s/s and volume overload  Hyponatremia with low urine Na and elevated Uosm, likely due to volume overload and possibly component of ADH stimulation triggered by pain  - Pt with elevated BNP, LE edema, and JVD on exam  - CTA chest negative for PE  - Continue Lasix 80mg IVP BID. If hypotension is a concern, hold Hydralazine or Nifedipine  - SNa improved from 118 to 121 with diuretics. Would give 100cc bolus of 2% saline with Lasix IV.  - Pt will likely need to be initiated on iHD this admission. Consent obtained. Would consult IR for PermCath placement, as well surgery (Dr. Frausto) for PD cath placement so pt can be transitioned to PD as an outpatient.      Hypocalcemia likely contributing to leg cramps  - Please replete calcium, especially while giving loop diuretics  - check serum phosphorus level.     HTN  - c/w home meds    anemia   - iron studies      dvt px  - sq heparin

## 2025-02-21 NOTE — PRE-OP CHECKLIST - TEMPERATURE IN CELSIUS (DEGREES C)
Discussed the importance of blood pressure control in the prevention of ocular complications. 36.9 36.7

## 2025-02-21 NOTE — PROVIDER CONTACT NOTE (MEDICATION) - SITUATION
patient receiving furosemide 80mg last dose given 1300pm but was concerned due to sodium levels already being low (Na:118)

## 2025-02-21 NOTE — PROGRESS NOTE ADULT - ATTENDING COMMENTS
Pt. with advanced kidney failure, presented with volume overload. s/p high dose of diuretics with improvement in volume status. Continue IV diuretics. Recommend to consult kidney transplant surgery for possible PD catheter placement. Pt. may need HD prior to PD catheter placement. Monitor BMP. Strict I/Os. Avoid nephrotoxins.

## 2025-02-21 NOTE — CHART NOTE - NSCHARTNOTEFT_GEN_A_CORE
Communicated with primary and nephrology teams - surgery plans to place PD catheter on Monday, 2/24 and patient requires 2+ sessions of dialysis prior to placement, therefore nephrology is requesting nontunneled catheter placement today to initiate HD.    - plan for non-tunneled catheter placement 2/21  - please place IR procedure order under MARCUS Smith  - heparin dose held this afternoon  - d/w primary and nephrology teams

## 2025-02-21 NOTE — CONSULT NOTE ADULT - ASSESSMENT
adiology:     Assessment/Plan:   59F with HTN and advanced CKD stage 5 (biopsy-proven IgA nephropathy in 2019) p/w chest pain. Nephrology consulted for CKD management and HD catheter for HD, Primary team has asked for PD catheter during this admission     - d/w nephrology attending, ok to Proceed with HD catheter and Dialysis   Case to be added on Feb 24th 2025 to the OR for PD catheter placement with Dr. Armando.  - STAT labs in AM (cbc,coags, bmp, T&S)  - hold HSQ 12 hours prior to procedure with tentative plan to resume AC 12 hours post procedure, if no concern for bleeding  - NPO on 2/23 at 11pm  - d/w primary team

## 2025-02-22 LAB
ANION GAP SERPL CALC-SCNC: 19 MMOL/L — HIGH (ref 5–17)
BUN SERPL-MCNC: 82 MG/DL — HIGH (ref 7–23)
CALCIUM SERPL-MCNC: 6.8 MG/DL — LOW (ref 8.4–10.5)
CALCIUM SERPL-MCNC: 7.2 MG/DL — LOW (ref 8.4–10.5)
CHLORIDE SERPL-SCNC: 85 MMOL/L — LOW (ref 96–108)
CO2 SERPL-SCNC: 22 MMOL/L — SIGNIFICANT CHANGE UP (ref 22–31)
CREAT SERPL-MCNC: 8.07 MG/DL — HIGH (ref 0.5–1.3)
EGFR: 5 ML/MIN/1.73M2 — LOW
EGFR: 5 ML/MIN/1.73M2 — LOW
GLUCOSE SERPL-MCNC: 107 MG/DL — HIGH (ref 70–99)
HCT VFR BLD CALC: 20.1 % — CRITICAL LOW (ref 34.5–45)
HGB BLD-MCNC: 7.1 G/DL — LOW (ref 11.5–15.5)
MCHC RBC-ENTMCNC: 30.3 PG — SIGNIFICANT CHANGE UP (ref 27–34)
MCHC RBC-ENTMCNC: 35.3 G/DL — SIGNIFICANT CHANGE UP (ref 32–36)
MCV RBC AUTO: 85.9 FL — SIGNIFICANT CHANGE UP (ref 80–100)
NRBC BLD AUTO-RTO: 0 /100 WBCS — SIGNIFICANT CHANGE UP (ref 0–0)
PLATELET # BLD AUTO: 165 K/UL — SIGNIFICANT CHANGE UP (ref 150–400)
POTASSIUM SERPL-MCNC: 3.7 MMOL/L — SIGNIFICANT CHANGE UP (ref 3.5–5.3)
POTASSIUM SERPL-SCNC: 3.7 MMOL/L — SIGNIFICANT CHANGE UP (ref 3.5–5.3)
PTH-INTACT FLD-MCNC: 377 PG/ML — HIGH (ref 15–65)
RBC # BLD: 2.34 M/UL — LOW (ref 3.8–5.2)
RBC # FLD: 13.7 % — SIGNIFICANT CHANGE UP (ref 10.3–14.5)
SODIUM SERPL-SCNC: 126 MMOL/L — LOW (ref 135–145)
VIT D25+D1,25 OH+D1,25 PNL SERPL-MCNC: 14.6 PG/ML — LOW (ref 19.9–79.3)
WBC # BLD: 8.67 K/UL — SIGNIFICANT CHANGE UP (ref 3.8–10.5)
WBC # FLD AUTO: 8.67 K/UL — SIGNIFICANT CHANGE UP (ref 3.8–10.5)

## 2025-02-22 PROCEDURE — 90935 HEMODIALYSIS ONE EVALUATION: CPT

## 2025-02-22 RX ADMIN — Medication 60 MILLIGRAM(S): at 05:05

## 2025-02-22 RX ADMIN — FUROSEMIDE 80 MILLIGRAM(S): 10 INJECTION INTRAMUSCULAR; INTRAVENOUS at 13:38

## 2025-02-22 RX ADMIN — HEPARIN SODIUM 5000 UNIT(S): 1000 INJECTION INTRAVENOUS; SUBCUTANEOUS at 13:36

## 2025-02-22 RX ADMIN — Medication 650 MILLIGRAM(S): at 13:36

## 2025-02-22 RX ADMIN — Medication 1334 MILLIGRAM(S): at 17:47

## 2025-02-22 RX ADMIN — Medication 650 MILLIGRAM(S): at 05:05

## 2025-02-22 RX ADMIN — Medication 325 MILLIGRAM(S): at 11:35

## 2025-02-22 RX ADMIN — HEPARIN SODIUM 5000 UNIT(S): 1000 INJECTION INTRAVENOUS; SUBCUTANEOUS at 21:17

## 2025-02-22 RX ADMIN — Medication 650 MILLIGRAM(S): at 21:17

## 2025-02-22 RX ADMIN — HEPARIN SODIUM 5000 UNIT(S): 1000 INJECTION INTRAVENOUS; SUBCUTANEOUS at 05:04

## 2025-02-22 RX ADMIN — FUROSEMIDE 80 MILLIGRAM(S): 10 INJECTION INTRAMUSCULAR; INTRAVENOUS at 05:05

## 2025-02-22 RX ADMIN — Medication 1 APPLICATION(S): at 05:41

## 2025-02-22 RX ADMIN — Medication 1334 MILLIGRAM(S): at 11:03

## 2025-02-22 NOTE — PROGRESS NOTE ADULT - ASSESSMENT
59F with HTN and advanced CKD stage 5 (biopsy-proven IgA nephropathy in 2019) p/w chest pain. Nephrology consulted for CKD management.    #CKD stage 5 with uremic s/s and volume overload: Pt. initiated on HD on 2/21 via non tunneled dialysis catheter. pt. tolerated HD well, seen on 2nd session of HD today that she is also tolerating well. BP stable during HD rounds. Plan for next HD session on 2/24 am, prior to PD catheter insertion by Dr. Armando. Monitor BMP.     #Hyponatremia with low urine Na and elevated Uosm, likely due to volume overload and possibly component of ADH stimulation triggered by pain  - Pt with elevated BNP, LE edema, and JVD on exam  - CTA chest negative for PE  - SNa improved from 118 to 121 with diuretics, on labs done on 2/21.  - Initiated on HD on 2/21. Monitor labs.     #Hypocalcemia in the setting of Hyperphosphatemia and advanced CKD  - Check PTH and vitamin D levels  - Start calcium acetate 1334mg TID with meals  - Continue to replete calcium with IV calcium gluconate   - will use high Ca bath with next HD if Ca still remains low.     #Anemia  - in the setting of advanced CKD  - Check iron studies and ferritin. May benefit from IV iron and/or JYOTSNA  - Blood transfusion as per primary medical team.

## 2025-02-22 NOTE — PROGRESS NOTE ADULT - ASSESSMENT
60 y/o female with pmh of CKD 5 , IGA nephropathy complaining of intermittent pulsating chest pain in the right precordium for the last 2 days.  Patient also complaining of shortness of breath with is worse when laying down.  She is complaining of cramping in the legs so patient stopped her torsemide yesterday.  She has a history of CKD (autoimmune glomerulonephritis) and is scheduled for consultation for dialysis next week.  She also has a history of hypertension and hyperlipidemia and anemia.  She is currently taking iron, hydralazine, nifedipine, torsemide and bicarb.  Her nephrologist is Dr. Yossi Palma.  She denies any fever or cough.  She denies any recent travel or history of PE/DVT.  She has no known or allergies.  Patient has been voiding normally.   is used for history and physical examination.  PCP - Aliza Correa MD   - [facility]. Daughter states pt primarily cared for by Dr. Palma.     CKD stage 5 with uremic s/s and volume overload: Pt. initiated on HD on 2/21 via non tunneled dialysis catheter. pt. tolerated HD well, seen on 2nd session of HD today that she is also tolerating well. BP stable during HD rounds. Plan for next HD session on 2/24 am, prior to PD catheter insertion by Dr. Armando. Monitor BMP.     Hyponatremia with low urine Na and elevated Uosm, likely due to volume overload and possibly component of ADH stimulation triggered by pain  - Pt with elevated BNP, LE edema, and JVD on exam  - CTA chest negative for PE  - SNa improved from 118 to 121 with diuretics, on labs done on 2/21.  - Initiated on HD on 2/21. Monitor labs.   .      Hypocalcemia likely contributing to leg cramps  - Please replete calcium, especially while giving loop diuretics  - check serum phosphorus level.     HTN  - c/w home meds    anemia   - iron studies      dvt px  - sq heparin   58 y/o female with pmh of CKD 5 , IGA nephropathy complaining of intermittent pulsating chest pain in the right precordium for the last 2 days.  Patient also complaining of shortness of breath with is worse when laying down.  She is complaining of cramping in the legs so patient stopped her torsemide yesterday.  She has a history of CKD (autoimmune glomerulonephritis) and is scheduled for consultation for dialysis next week.  She also has a history of hypertension and hyperlipidemia and anemia.  She is currently taking iron, hydralazine, nifedipine, torsemide and bicarb.  Her nephrologist is Dr. Yossi Palma.  She denies any fever or cough.  She denies any recent travel or history of PE/DVT.  She has no known or allergies.  Patient has been voiding normally.   is used for history and physical examination.  PCP - Aliza Correa MD   - [facility]. Daughter states pt primarily cared for by Dr. Palma.     CKD stage 5 with uremic s/s and volume overload: Pt. initiated on HD on 2/21 via non tunneled dialysis catheter. pt. tolerated HD well, seen on 2nd session of HD today that she is also tolerating well. BP stable during HD rounds. Plan for next HD session on 2/24 am, prior to PD catheter insertion by Dr. Armando. Monitor BMP.     Hyponatremia with low urine Na and elevated Uosm, likely due to volume overload and possibly component of ADH stimulation triggered by pain  - Pt with elevated BNP, LE edema, and JVD on exam  - CTA chest negative for PE  - SNa improved from 118 to 121 with diuretics, on labs done on 2/21.  - Initiated on HD on 2/21. Monitor labs.   .  Anemia  - transfuse at HD    Hypocalcemia likely contributing to leg cramps  - Please replete calcium, especially while giving loop diuretics  - check serum phosphorus level.     HTN  - c/w home meds    dvt px  - sq heparin

## 2025-02-23 LAB
ANION GAP SERPL CALC-SCNC: 18 MMOL/L — HIGH (ref 5–17)
BLD GP AB SCN SERPL QL: NEGATIVE — SIGNIFICANT CHANGE UP
BUN SERPL-MCNC: 54 MG/DL — HIGH (ref 7–23)
CALCIUM SERPL-MCNC: 8 MG/DL — LOW (ref 8.4–10.5)
CHLORIDE SERPL-SCNC: 95 MMOL/L — LOW (ref 96–108)
CO2 SERPL-SCNC: 22 MMOL/L — SIGNIFICANT CHANGE UP (ref 22–31)
CREAT SERPL-MCNC: 6.11 MG/DL — HIGH (ref 0.5–1.3)
EGFR: 7 ML/MIN/1.73M2 — LOW
EGFR: 7 ML/MIN/1.73M2 — LOW
GLUCOSE SERPL-MCNC: 91 MG/DL — SIGNIFICANT CHANGE UP (ref 70–99)
HCT VFR BLD CALC: 19.7 % — CRITICAL LOW (ref 34.5–45)
HCT VFR BLD CALC: 22.6 % — LOW (ref 34.5–45)
HGB BLD-MCNC: 6.6 G/DL — CRITICAL LOW (ref 11.5–15.5)
HGB BLD-MCNC: 7.5 G/DL — LOW (ref 11.5–15.5)
MAGNESIUM SERPL-MCNC: 2.2 MG/DL — SIGNIFICANT CHANGE UP (ref 1.6–2.6)
MCHC RBC-ENTMCNC: 29.5 PG — SIGNIFICANT CHANGE UP (ref 27–34)
MCHC RBC-ENTMCNC: 30.1 PG — SIGNIFICANT CHANGE UP (ref 27–34)
MCHC RBC-ENTMCNC: 33.2 G/DL — SIGNIFICANT CHANGE UP (ref 32–36)
MCHC RBC-ENTMCNC: 33.5 G/DL — SIGNIFICANT CHANGE UP (ref 32–36)
MCV RBC AUTO: 89 FL — SIGNIFICANT CHANGE UP (ref 80–100)
MCV RBC AUTO: 90 FL — SIGNIFICANT CHANGE UP (ref 80–100)
NRBC BLD AUTO-RTO: 0 /100 WBCS — SIGNIFICANT CHANGE UP (ref 0–0)
NRBC BLD AUTO-RTO: 0 /100 WBCS — SIGNIFICANT CHANGE UP (ref 0–0)
PHOSPHATE SERPL-MCNC: 4.1 MG/DL — SIGNIFICANT CHANGE UP (ref 2.5–4.5)
PLATELET # BLD AUTO: 160 K/UL — SIGNIFICANT CHANGE UP (ref 150–400)
PLATELET # BLD AUTO: 165 K/UL — SIGNIFICANT CHANGE UP (ref 150–400)
POTASSIUM SERPL-MCNC: 3.8 MMOL/L — SIGNIFICANT CHANGE UP (ref 3.5–5.3)
POTASSIUM SERPL-SCNC: 3.8 MMOL/L — SIGNIFICANT CHANGE UP (ref 3.5–5.3)
RBC # BLD: 2.19 M/UL — LOW (ref 3.8–5.2)
RBC # BLD: 2.54 M/UL — LOW (ref 3.8–5.2)
RBC # FLD: 14 % — SIGNIFICANT CHANGE UP (ref 10.3–14.5)
RBC # FLD: 14.3 % — SIGNIFICANT CHANGE UP (ref 10.3–14.5)
RH IG SCN BLD-IMP: POSITIVE — SIGNIFICANT CHANGE UP
SODIUM SERPL-SCNC: 135 MMOL/L — SIGNIFICANT CHANGE UP (ref 135–145)
WBC # BLD: 7.01 K/UL — SIGNIFICANT CHANGE UP (ref 3.8–10.5)
WBC # BLD: 8.03 K/UL — SIGNIFICANT CHANGE UP (ref 3.8–10.5)
WBC # FLD AUTO: 7.01 K/UL — SIGNIFICANT CHANGE UP (ref 3.8–10.5)
WBC # FLD AUTO: 8.03 K/UL — SIGNIFICANT CHANGE UP (ref 3.8–10.5)

## 2025-02-23 RX ADMIN — Medication 1 APPLICATION(S): at 05:14

## 2025-02-23 RX ADMIN — Medication 1334 MILLIGRAM(S): at 18:09

## 2025-02-23 RX ADMIN — Medication 1334 MILLIGRAM(S): at 13:15

## 2025-02-23 RX ADMIN — FUROSEMIDE 80 MILLIGRAM(S): 10 INJECTION INTRAMUSCULAR; INTRAVENOUS at 05:07

## 2025-02-23 RX ADMIN — Medication 650 MILLIGRAM(S): at 13:15

## 2025-02-23 RX ADMIN — Medication 60 MILLIGRAM(S): at 05:07

## 2025-02-23 RX ADMIN — Medication 1334 MILLIGRAM(S): at 08:48

## 2025-02-23 RX ADMIN — Medication 650 MILLIGRAM(S): at 22:12

## 2025-02-23 RX ADMIN — Medication 650 MILLIGRAM(S): at 05:07

## 2025-02-23 RX ADMIN — FUROSEMIDE 80 MILLIGRAM(S): 10 INJECTION INTRAMUSCULAR; INTRAVENOUS at 13:16

## 2025-02-23 RX ADMIN — Medication 325 MILLIGRAM(S): at 13:15

## 2025-02-23 RX ADMIN — HEPARIN SODIUM 5000 UNIT(S): 1000 INJECTION INTRAVENOUS; SUBCUTANEOUS at 05:08

## 2025-02-23 RX ADMIN — HEPARIN SODIUM 5000 UNIT(S): 1000 INJECTION INTRAVENOUS; SUBCUTANEOUS at 13:16

## 2025-02-23 NOTE — PROGRESS NOTE ADULT - ASSESSMENT
60 y/o female with pmh of CKD 5 , IGA nephropathy complaining of intermittent pulsating chest pain in the right precordium for the last 2 days.  Patient also complaining of shortness of breath with is worse when laying down.  She is complaining of cramping in the legs so patient stopped her torsemide yesterday.  She has a history of CKD (autoimmune glomerulonephritis) and is scheduled for consultation for dialysis next week.  She also has a history of hypertension and hyperlipidemia and anemia.  She is currently taking iron, hydralazine, nifedipine, torsemide and bicarb.  Her nephrologist is Dr. Yossi Palma.  She denies any fever or cough.  She denies any recent travel or history of PE/DVT.  She has no known or allergies.  Patient has been voiding normally.   is used for history and physical examination.  PCP - Aliza Correa MD   - [facility]. Daughter states pt primarily cared for by Dr. Palma.     CKD stage 5 with uremic s/s and volume overload: Pt. initiated on HD on 2/21 via non tunneled dialysis catheter. pt. tolerated HD well, seen on 2nd session of HD today that she is also tolerating well. BP stable during HD rounds. Plan for next HD session on 2/24 am, prior to PD catheter insertion by Dr. Armando. Monitor BMP.     Hyponatremia with low urine Na and elevated Uosm, likely due to volume overload and possibly component of ADH stimulation triggered by pain  - Pt with elevated BNP, LE edema, and JVD on exam  - CTA chest negative for PE  - SNa improved from 118 to 121 with diuretics, on labs done on 2/21.  - Initiated on HD on 2/21. Monitor labs.   .  Anemia  - transfuse 1 unit of prbc    Hypocalcemia likely contributing to leg cramps  - Please replete calcium, especially while giving loop diuretics  - check serum phosphorus level.     HTN  - c/w home meds    dvt px  - sq heparin

## 2025-02-24 ENCOUNTER — RESULT REVIEW (OUTPATIENT)
Age: 60
End: 2025-02-24

## 2025-02-24 LAB
ANION GAP SERPL CALC-SCNC: 14 MMOL/L — SIGNIFICANT CHANGE UP (ref 5–17)
ANION GAP SERPL CALC-SCNC: 18 MMOL/L — HIGH (ref 5–17)
APTT BLD: 31.8 SEC — SIGNIFICANT CHANGE UP (ref 24.5–35.6)
BLD GP AB SCN SERPL QL: NEGATIVE — SIGNIFICANT CHANGE UP
BUN SERPL-MCNC: 24 MG/DL — HIGH (ref 7–23)
BUN SERPL-MCNC: 71 MG/DL — HIGH (ref 7–23)
CALCIUM SERPL-MCNC: 8.4 MG/DL — SIGNIFICANT CHANGE UP (ref 8.4–10.5)
CALCIUM SERPL-MCNC: 9.1 MG/DL — SIGNIFICANT CHANGE UP (ref 8.4–10.5)
CHLORIDE SERPL-SCNC: 95 MMOL/L — LOW (ref 96–108)
CHLORIDE SERPL-SCNC: 96 MMOL/L — SIGNIFICANT CHANGE UP (ref 96–108)
CO2 SERPL-SCNC: 23 MMOL/L — SIGNIFICANT CHANGE UP (ref 22–31)
CO2 SERPL-SCNC: 26 MMOL/L — SIGNIFICANT CHANGE UP (ref 22–31)
CREAT SERPL-MCNC: 3.61 MG/DL — HIGH (ref 0.5–1.3)
CREAT SERPL-MCNC: 7.56 MG/DL — HIGH (ref 0.5–1.3)
EGFR: 14 ML/MIN/1.73M2 — LOW
EGFR: 14 ML/MIN/1.73M2 — LOW
EGFR: 6 ML/MIN/1.73M2 — LOW
EGFR: 6 ML/MIN/1.73M2 — LOW
GLUCOSE SERPL-MCNC: 102 MG/DL — HIGH (ref 70–99)
GLUCOSE SERPL-MCNC: 90 MG/DL — SIGNIFICANT CHANGE UP (ref 70–99)
HCT VFR BLD CALC: 21.9 % — LOW (ref 34.5–45)
HCT VFR BLD CALC: 24.3 % — LOW (ref 34.5–45)
HGB BLD-MCNC: 7.4 G/DL — LOW (ref 11.5–15.5)
HGB BLD-MCNC: 8.1 G/DL — LOW (ref 11.5–15.5)
INR BLD: 0.99 RATIO — SIGNIFICANT CHANGE UP (ref 0.85–1.16)
MAGNESIUM SERPL-MCNC: 2.3 MG/DL — SIGNIFICANT CHANGE UP (ref 1.6–2.6)
MCHC RBC-ENTMCNC: 29.3 PG — SIGNIFICANT CHANGE UP (ref 27–34)
MCHC RBC-ENTMCNC: 30.6 PG — SIGNIFICANT CHANGE UP (ref 27–34)
MCHC RBC-ENTMCNC: 33.3 G/DL — SIGNIFICANT CHANGE UP (ref 32–36)
MCHC RBC-ENTMCNC: 33.8 G/DL — SIGNIFICANT CHANGE UP (ref 32–36)
MCV RBC AUTO: 88 FL — SIGNIFICANT CHANGE UP (ref 80–100)
MCV RBC AUTO: 90.5 FL — SIGNIFICANT CHANGE UP (ref 80–100)
NRBC BLD AUTO-RTO: 0 /100 WBCS — SIGNIFICANT CHANGE UP (ref 0–0)
NRBC BLD AUTO-RTO: 0 /100 WBCS — SIGNIFICANT CHANGE UP (ref 0–0)
PHOSPHATE SERPL-MCNC: 4.3 MG/DL — SIGNIFICANT CHANGE UP (ref 2.5–4.5)
PLATELET # BLD AUTO: 156 K/UL — SIGNIFICANT CHANGE UP (ref 150–400)
PLATELET # BLD AUTO: 161 K/UL — SIGNIFICANT CHANGE UP (ref 150–400)
POTASSIUM SERPL-MCNC: 4 MMOL/L — SIGNIFICANT CHANGE UP (ref 3.5–5.3)
POTASSIUM SERPL-MCNC: 4 MMOL/L — SIGNIFICANT CHANGE UP (ref 3.5–5.3)
POTASSIUM SERPL-SCNC: 4 MMOL/L — SIGNIFICANT CHANGE UP (ref 3.5–5.3)
POTASSIUM SERPL-SCNC: 4 MMOL/L — SIGNIFICANT CHANGE UP (ref 3.5–5.3)
PROTHROM AB SERPL-ACNC: 11.3 SEC — SIGNIFICANT CHANGE UP (ref 9.9–13.4)
RBC # BLD: 2.42 M/UL — LOW (ref 3.8–5.2)
RBC # BLD: 2.76 M/UL — LOW (ref 3.8–5.2)
RBC # FLD: 14.3 % — SIGNIFICANT CHANGE UP (ref 10.3–14.5)
RBC # FLD: 14.4 % — SIGNIFICANT CHANGE UP (ref 10.3–14.5)
RH IG SCN BLD-IMP: POSITIVE — SIGNIFICANT CHANGE UP
SODIUM SERPL-SCNC: 136 MMOL/L — SIGNIFICANT CHANGE UP (ref 135–145)
SODIUM SERPL-SCNC: 136 MMOL/L — SIGNIFICANT CHANGE UP (ref 135–145)
WBC # BLD: 6.56 K/UL — SIGNIFICANT CHANGE UP (ref 3.8–10.5)
WBC # BLD: 6.75 K/UL — SIGNIFICANT CHANGE UP (ref 3.8–10.5)
WBC # FLD AUTO: 6.56 K/UL — SIGNIFICANT CHANGE UP (ref 3.8–10.5)
WBC # FLD AUTO: 6.75 K/UL — SIGNIFICANT CHANGE UP (ref 3.8–10.5)

## 2025-02-24 PROCEDURE — 93356 MYOCRD STRAIN IMG SPCKL TRCK: CPT

## 2025-02-24 PROCEDURE — 90935 HEMODIALYSIS ONE EVALUATION: CPT

## 2025-02-24 PROCEDURE — 93306 TTE W/DOPPLER COMPLETE: CPT | Mod: 26

## 2025-02-24 RX ORDER — ONDANSETRON HCL/PF 4 MG/2 ML
4 VIAL (ML) INJECTION ONCE
Refills: 0 | Status: DISCONTINUED | OUTPATIENT
Start: 2025-02-24 | End: 2025-03-04

## 2025-02-24 RX ADMIN — Medication 650 MILLIGRAM(S): at 21:35

## 2025-02-24 RX ADMIN — Medication 325 MILLIGRAM(S): at 15:22

## 2025-02-24 RX ADMIN — Medication 650 MILLIGRAM(S): at 05:59

## 2025-02-24 RX ADMIN — Medication 650 MILLIGRAM(S): at 15:36

## 2025-02-24 RX ADMIN — Medication 1 APPLICATION(S): at 06:07

## 2025-02-24 RX ADMIN — HEPARIN SODIUM 5000 UNIT(S): 1000 INJECTION INTRAVENOUS; SUBCUTANEOUS at 21:36

## 2025-02-24 RX ADMIN — Medication 60 MILLIGRAM(S): at 06:00

## 2025-02-24 RX ADMIN — FUROSEMIDE 80 MILLIGRAM(S): 10 INJECTION INTRAMUSCULAR; INTRAVENOUS at 06:00

## 2025-02-24 RX ADMIN — FUROSEMIDE 80 MILLIGRAM(S): 10 INJECTION INTRAMUSCULAR; INTRAVENOUS at 15:21

## 2025-02-24 NOTE — CHART NOTE - NSCHARTNOTEFT_GEN_A_CORE
After lengthy discussion with patient and daughter with Dr. Palma present as well, pt would like to defer PD cath placement today. She feels fatigued after HD today and is nauseous and anxious. Explained to her that PD cath placement may be delayed for later this admission or even outpatient, which is TBD.   Please obtain IR consult to exchange temp cath with tunneled dialysis cath.  Please make case management aware that pt will need a seat at [facility] upon discharge for HD three days a week.      Lester Metcalf, PGY-5  Nephrology Fellow  MS TEAMS preferred  (After 5pm or on weekends, please contact the fellow on call).

## 2025-02-24 NOTE — PROGRESS NOTE ADULT - ASSESSMENT
59F with HTN and advanced CKD stage 5 (biopsy-proven IgA nephropathy in 2019) p/w chest pain. Nephrology consulted for CKD management.    #CKD stage 5 with uremic s/s and volume overload: Pt. initiated on HD on 2/21 via non tunneled dialysis catheter. pt. tolerated HD well, seen on 2nd session of HD today that she is also tolerating well. BP stable during HD rounds. Seen during HD session today, tolerating well. Pt. to undergo PD catheter insertion by Dr. Armando later today. Monitor BMP.     #Hyponatremia with low urine Na and elevated Uosm, likely due to volume overload and possibly component of ADH stimulation triggered by pain  - resolved with HD  - Initiated on HD on 2/21. Had another session of HD today. Monitor labs.     #Hypocalcemia in the setting of Hyperphosphatemia and advanced CKD  - iPTH elevated, in the setting of secondary hyperparathyroidism, levels acceptable for CKD.   - Continue calcium acetate 1334mg TID with meals    #Anemia  - in the setting of advanced CKD  - Follow up results of iron studies. May benefit  JYOTSNA  - Monitor CBC

## 2025-02-24 NOTE — PROGRESS NOTE ADULT - ASSESSMENT
58 y/o female with pmh of CKD 5 , IGA nephropathy complaining of intermittent pulsating chest pain in the right precordium for the last 2 days.  Patient also complaining of shortness of breath with is worse when laying down.  She is complaining of cramping in the legs so patient stopped her torsemide yesterday.  She has a history of CKD (autoimmune glomerulonephritis) and is scheduled for consultation for dialysis next week.  She also has a history of hypertension and hyperlipidemia and anemia.  She is currently taking iron, hydralazine, nifedipine, torsemide and bicarb.  Her nephrologist is Dr. Yossi Palma.  She denies any fever or cough.  She denies any recent travel or history of PE/DVT.  She has no known or allergies.  Patient has been voiding normally.   is used for history and physical examination.  PCP - Aliza Correa MD   - [facility]. Daughter states pt primarily cared for by Dr. Palma.     CKD stage 5 with uremic s/s and volume overload: Pt. initiated on HD on 2/21 via non tunneled dialysis catheter. pt. tolerated HD well, seen on 2nd session of HD today that she is also tolerating well. BP stable during HD rounds. Plan for next HD session on 2/24 am, prior to PD catheter insertion by Dr. Armando. Monitor BMP.     Hyponatremia with low urine Na and elevated Uosm, likely due to volume overload and possibly component of ADH stimulation triggered by pain  - Pt with elevated BNP, LE edema, and JVD on exam  - CTA chest negative for PE  - SNa improved from 118 to 121 with diuretics, on labs done on 2/21.  - Initiated on HD on 2/21. Monitor labs.   .  Anemia  - s/p  1 unit of prbc    Hypocalcemia likely contributing to leg cramps  - Please replete calcium, especially while giving loop diuretics  - check serum phosphorus level.     HTN  - c/w home meds    dvt px  - sq heparin

## 2025-02-25 ENCOUNTER — APPOINTMENT (OUTPATIENT)
Dept: TRANSPLANT | Facility: CLINIC | Age: 60
End: 2025-02-25

## 2025-02-25 LAB
ANION GAP SERPL CALC-SCNC: 13 MMOL/L — SIGNIFICANT CHANGE UP (ref 5–17)
BUN SERPL-MCNC: 42 MG/DL — HIGH (ref 7–23)
CALCIUM SERPL-MCNC: 8.6 MG/DL — SIGNIFICANT CHANGE UP (ref 8.4–10.5)
CHLORIDE SERPL-SCNC: 96 MMOL/L — SIGNIFICANT CHANGE UP (ref 96–108)
CO2 SERPL-SCNC: 29 MMOL/L — SIGNIFICANT CHANGE UP (ref 22–31)
CREAT SERPL-MCNC: 5.03 MG/DL — HIGH (ref 0.5–1.3)
EGFR: 9 ML/MIN/1.73M2 — LOW
EGFR: 9 ML/MIN/1.73M2 — LOW
GLUCOSE SERPL-MCNC: 88 MG/DL — SIGNIFICANT CHANGE UP (ref 70–99)
HAV IGM SER-ACNC: SIGNIFICANT CHANGE UP
HBV CORE IGM SER-ACNC: SIGNIFICANT CHANGE UP
HBV SURFACE AG SER-ACNC: SIGNIFICANT CHANGE UP
HCT VFR BLD CALC: 22.6 % — LOW (ref 34.5–45)
HCV AB S/CO SERPL IA: 0.21 S/CO — SIGNIFICANT CHANGE UP (ref 0–0.79)
HCV AB SERPL-IMP: SIGNIFICANT CHANGE UP
HGB BLD-MCNC: 7.5 G/DL — LOW (ref 11.5–15.5)
MAGNESIUM SERPL-MCNC: 2.1 MG/DL — SIGNIFICANT CHANGE UP (ref 1.6–2.6)
MCHC RBC-ENTMCNC: 30.7 PG — SIGNIFICANT CHANGE UP (ref 27–34)
MCHC RBC-ENTMCNC: 33.2 G/DL — SIGNIFICANT CHANGE UP (ref 32–36)
MCV RBC AUTO: 92.6 FL — SIGNIFICANT CHANGE UP (ref 80–100)
NRBC BLD AUTO-RTO: 0 /100 WBCS — SIGNIFICANT CHANGE UP (ref 0–0)
PHOSPHATE SERPL-MCNC: 4 MG/DL — SIGNIFICANT CHANGE UP (ref 2.5–4.5)
PLATELET # BLD AUTO: 168 K/UL — SIGNIFICANT CHANGE UP (ref 150–400)
POTASSIUM SERPL-MCNC: 4.1 MMOL/L — SIGNIFICANT CHANGE UP (ref 3.5–5.3)
POTASSIUM SERPL-SCNC: 4.1 MMOL/L — SIGNIFICANT CHANGE UP (ref 3.5–5.3)
RBC # BLD: 2.44 M/UL — LOW (ref 3.8–5.2)
RBC # FLD: 13.9 % — SIGNIFICANT CHANGE UP (ref 10.3–14.5)
SODIUM SERPL-SCNC: 138 MMOL/L — SIGNIFICANT CHANGE UP (ref 135–145)
WBC # BLD: 6.3 K/UL — SIGNIFICANT CHANGE UP (ref 3.8–10.5)
WBC # FLD AUTO: 6.3 K/UL — SIGNIFICANT CHANGE UP (ref 3.8–10.5)

## 2025-02-25 PROCEDURE — 99233 SBSQ HOSP IP/OBS HIGH 50: CPT | Mod: GC

## 2025-02-25 RX ADMIN — Medication 650 MILLIGRAM(S): at 05:12

## 2025-02-25 RX ADMIN — FUROSEMIDE 80 MILLIGRAM(S): 10 INJECTION INTRAMUSCULAR; INTRAVENOUS at 13:23

## 2025-02-25 RX ADMIN — FUROSEMIDE 80 MILLIGRAM(S): 10 INJECTION INTRAMUSCULAR; INTRAVENOUS at 05:15

## 2025-02-25 RX ADMIN — Medication 60 MILLIGRAM(S): at 05:13

## 2025-02-25 RX ADMIN — Medication 1334 MILLIGRAM(S): at 13:23

## 2025-02-25 RX ADMIN — Medication 1334 MILLIGRAM(S): at 08:34

## 2025-02-25 RX ADMIN — Medication 325 MILLIGRAM(S): at 13:23

## 2025-02-25 RX ADMIN — Medication 1334 MILLIGRAM(S): at 17:22

## 2025-02-25 RX ADMIN — HEPARIN SODIUM 5000 UNIT(S): 1000 INJECTION INTRAVENOUS; SUBCUTANEOUS at 05:13

## 2025-02-25 RX ADMIN — Medication 650 MILLIGRAM(S): at 21:12

## 2025-02-25 RX ADMIN — Medication 1 APPLICATION(S): at 05:20

## 2025-02-25 RX ADMIN — Medication 650 MILLIGRAM(S): at 13:23

## 2025-02-25 NOTE — CHART NOTE - NSCHARTNOTEFT_GEN_A_CORE
Conversation held with Patient and her family in Greek in regards to have her PD catheter placement scheduled for 2/26/2025  Patient and family refused as patient does not want to have HD session they day of Procedure.  Primary team updated. Conversation held with Patient and her family ,in Danish,  in regards to have her PD catheter placement scheduled for 2/26/2025  Patient and family refused as patient does not want to have HD session the day of Procedure.  Primary team updated.

## 2025-02-25 NOTE — PROGRESS NOTE ADULT - ASSESSMENT
60 y/o female with pmh of CKD 5 , IGA nephropathy complaining of intermittent pulsating chest pain in the right precordium for the last 2 days.  Patient also complaining of shortness of breath with is worse when laying down.  She is complaining of cramping in the legs so patient stopped her torsemide yesterday.  She has a history of CKD (autoimmune glomerulonephritis) and is scheduled for consultation for dialysis next week.  She also has a history of hypertension and hyperlipidemia and anemia.  She is currently taking iron, hydralazine, nifedipine, torsemide and bicarb.  Her nephrologist is Dr. Yossi Palma.  She denies any fever or cough.  She denies any recent travel or history of PE/DVT.  She has no known or allergies.  Patient has been voiding normally.   is used for history and physical examination.  PCP - Aliza Correa MD   - [facility]. Daughter states pt primarily cared for by Dr. Palma.     CKD stage 5 with uremic s/s and volume overload:   - Pt. initiated on HD on 2/21 via non tunneled dialysis catheter. pt. tolerated HD well, seen on 2nd session of HD yesterday.  Pt refused PD catheter given she was really drained. PLan for tunnelled HD cath this week and maybe PD catheter later on this week or next week or outpatient.  Pt aware that she can be dc with HD at [facility] and pd can also be done outpatient but daughters and pt may want to get it done this admission      #Hyponatremia with low urine Na and elevated Uosm,   - likely due to volume overload and possibly component of ADH stimulation triggered by pain  - resolved with HD  - Initiated on HD on 2/21. Had another session of HD today. Monitor labs.   .  Anemia  - s/p  1 unit of prbc    Hypocalcemia likely contributing to leg cramps  - Please replete calcium, especially while giving loop diuretics  - check serum phosphorus level.     HTN  - c/w home meds    dvt px  - sq heparin

## 2025-02-25 NOTE — CONSULT NOTE ADULT - SUBJECTIVE AND OBJECTIVE BOX
[facility] DIVISION OF KIDNEY DISEASES AND HYPERTENSION -- INITIAL CONSULT NOTE  --------------------------------------------------------------------------------  HPI: 59F with HTN and advanced CKD stage 5 p/w chest pain. Nephrology consulted for CKD management.    Pt seen and examined at bedside. Her outpatient nephrologist is Dr. Palma. Pt was started on Torsemide 40mg on 2/11/25. Per daughter, she lost 2 lbs, but still has edema. She started c/o leg cramps for the past few days, as well as chest pain, but denied SOB or orthopnea. She has an appt to see Dr. Frausto next week for PD cath placement.    Upon presentations, vitals are stable. Labs significant for Na 118, , Cr 10.6, Ca 6.7, BNP 15,394. Urine studies showed low urine sodium with elevated Uosm, large blood but no RBCs, and UPCR 4.5g. CK level 635.      PAST HISTORY  --------------------------------------------------------------------------------  PAST MEDICAL & SURGICAL HISTORY:  Proteinuria  Hyperlipidemia  H/O bleeding following renal biopsy  Stage 5 chronic kidney disease not on chronic dialysis  H/O transfusion of whole blood  Hypertension  S/P cholecystectomy  H/O tubal ligation      FAMILY HISTORY:    PAST SOCIAL HISTORY:    ALLERGIES & MEDICATIONS  --------------------------------------------------------------------------------  Allergies    No Known Allergies      Standing Inpatient Medications    PRN Inpatient Medications      REVIEW OF SYSTEMS  --------------------------------------------------------------------------------  Gen: No fever  Respiratory: No dyspnea  CV: No chest pain  GI: No abdominal pain, diarrhea, constipation, nausea, vomiting  : No increased frequency, dysuria, hematuria  Skin: No rashes  MSK: +LE edema, +leg cramps intermittent  Neuro: No dizziness/lightheadedness    All other systems were reviewed and are negative, except as noted.    VITALS/PHYSICAL EXAM  --------------------------------------------------------------------------------  T(C): 37 (02-20-25 @ 13:07), Max: 37 (02-20-25 @ 13:07)  HR: 72 (02-20-25 @ 13:07) (72 - 88)  BP: 136/82 (02-20-25 @ 13:07) (135/73 - 141/71)  RR: 21 (02-20-25 @ 13:07) (20 - 23)  SpO2: 99% (02-20-25 @ 13:07) (96% - 99%)  Wt(kg): --  Height (cm): 149.9 (02-20-25 @ 08:09)  Weight (kg): 54 (02-20-25 @ 08:09)  BMI (kg/m2): 24 (02-20-25 @ 08:09)  BSA (m2): 1.48 (02-20-25 @ 08:09)      PHYSICAL EXAM:  Gen: NAD  Neuro: non-focal, slight asterixis  HEENT: +JVD  Pulm: CTA B/L  CV: +S1S2  Abd: soft, non-tender/non-distended  Extremities: ++pitting B/L LE edema  Skin: Warm    LABS/STUDIES  --------------------------------------------------------------------------------              8.4    11.00 >-----------<  195      [02-20-25 @ 08:47]              23.0     118  |  74  |  101  ----------------------------<  133      [02-20-25 @ 08:47]  3.7   |  15  |  10.62        Ca     6.7     [02-20-25 @ 08:47]      Mg     2.2     [02-20-25 @ 08:47]    TPro  7.9  /  Alb  4.2  /  TBili  0.5  /  DBili  x   /  AST  22  /  ALT  14  /  AlkPhos  109  [02-20-25 @ 08:47]    PT/INR: PT 11.6 , INR 1.02       [02-20-25 @ 08:47]  PTT: 34.9       [02-20-25 @ 08:47]      Creatinine Trend:  SCr 10.62 [02-20 @ 08:47]    Urinalysis - [02-20-25 @ 10:15]      Color Yellow / Appearance Cloudy / SG 1.012 / pH 5.5      Gluc Negative / Ketone Negative  / Bili Negative / Urobili 0.2       Blood Large / Protein >=1000 / Leuk Est Negative / Nitrite Negative      RBC 2 / WBC 2 / Hyaline  / Gran  / Sq Epi  / Non Sq Epi 1 / Bacteria Negative    Urine Creatinine 80      [02-20-25 @ 10:15]  Urine Protein 357      [02-20-25 @ 10:15]  Urine Sodium 15      [02-20-25 @ 10:15]  Urine Potassium 26      [02-20-25 @ 10:15]  Urine Osmolality 228      [02-20-25 @ 10:15]
  PAST MEDICAL & SURGICAL HISTORY:  Proteinuria      Hyperlipidemia      H/O bleeding following renal biopsy      Stage 5 chronic kidney disease not on chronic dialysis      H/O transfusion of whole blood      Hypertension      Uses Guinean as primary spoken language      Colon cancer screening      S/P cholecystectomy      H/O tubal ligation          FAMILY HISTORY:      Allergies    No Known Allergies    Intolerances        HOME MEDICATIONS:  OBJECTIVE:  ICU Vital Signs Last 24 Hrs  T(C): 37 (2025 13:07), Max: 37 (2025 13:07)  T(F): 98.6 (2025 13:07), Max: 98.6 (2025 13:07)  HR: 72 (2025 13:07) (72 - 88)  BP: 136/82 (2025 13:07) (135/73 - 141/71)  BP(mean): 100 (2025 09:13) (100 - 100)  ABP: --  ABP(mean): --  RR: 21 (2025 13:07) (20 - 23)  SpO2: 99% (2025 13:07) (96% - 99%)    O2 Parameters below as of 2025 13:07  Patient On (Oxygen Delivery Method): room air              CAPILLARY BLOOD GLUCOSE          PHYSICAL EXAM:  General:   HEENT:   Lymph Nodes:  Neck:   Respiratory:   Cardiovascular:   Abdomen:   Extremities:   Skin:   Neurological:  Psychiatry:    LINES:     HOSPITAL MEDICATIONS:  MEDICATIONS  (STANDING):    MEDICATIONS  (PRN):      LABS:                        8.4    11.00 )-----------( 195      ( 2025 08:47 )             23.0     Hgb Trend: 8.4<--  02-20    118[LL]  |  74[L]  |  101[H]  ----------------------------<  133[H]  3.7   |  15[L]  |  10.62[H]    Ca    6.7[L]      2025 08:47  Mg     2.2     02-20    TPro  7.9  /  Alb  4.2  /  TBili  0.5  /  DBili  x   /  AST  22  /  ALT  14  /  AlkPhos  109  02-20    Creatinine Trend: 10.62<--  PT/INR - ( 2025 08:47 )   PT: 11.6 sec;   INR: 1.02 ratio         PTT - ( 2025 08:47 )  PTT:34.9 sec  Urinalysis Basic - ( 2025 10:15 )    Color: Yellow / Appearance: Cloudy / S.012 / pH: x  Gluc: x / Ketone: Negative mg/dL  / Bili: Negative / Urobili: 0.2 mg/dL   Blood: x / Protein: >=1000 mg/dL / Nitrite: Negative   Leuk Esterase: Negative / RBC: 2 /HPF / WBC 2 /HPF   Sq Epi: x / Non Sq Epi: 1 /HPF / Bacteria: Negative /HPF        Venous Blood Gas:   @ 08:27  7.33/33/77/17/97.9  VBG Lactate: 1.6      MICROBIOLOGY:     RADIOLOGY:  [ ] Reviewed and interpreted by me    EKG:
    HPI: 59F with HTN and advanced CKD stage 5 (biopsy-proven IgA nephropathy in 2019) p/w chest pain. Nephrology consulted for CKD management and is now requesting tunneled HD catheter for HD and Transplant Surgery consultation for PD catheter placement .    Allergies: No Known Allergies    PSgHx: Lap Cholecystectomy 1998             Tubal Ligation ~30 years ago    Medications (Abx/Cardiac/Anticoagulation/Blood Products)  aspirin  chewable: 324 milliGRAM(s) Oral (02-20 @ 09:08)  furosemide   Injectable: 80 milliGRAM(s) IV Push (02-20 @ 13:34)  furosemide   Injectable: 80 milliGRAM(s) IV Push (02-21 @ 05:35)  heparin   Injectable: 5000 Unit(s) SubCutaneous (02-21 @ 05:37)  hydrALAZINE: 25 milliGRAM(s) Oral (02-21 @ 05:37)  NIFEdipine XL: 60 milliGRAM(s) Oral (02-21 @ 05:37)    Data:  T(C): 36.8  HR: 78  BP: 117/65  RR: 18  SpO2: 96%    PHYSICAL EXAM:  GENERAL: NAD, well-developed  HEAD:  Atraumatic, Normocephalic  EYES: EOMI, PERRLA, conjunctiva and sclera clear  NECK: Supple, No JVD  CHEST/LUNG: Clear to auscultation bilaterally; No wheeze  HEART: Regular rate and rhythm; No murmurs, rubs, or gallops  ABDOMEN: Soft, Nontender, Nondistended; Bowel sounds present  EXTREMITIES:  2+ Peripheral Pulses, No clubbing, cyanosis, or edema  PSYCH: AAOx3  NEUROLOGY: non-focal  SKIN: No rashes or lesions    LABS:      -WBC 10.49 / HgB 7.6 / Hct 21.4 / Plt 157  -Na 121 / Cl 76 / BUN 99 / Glucose 102  -K 3.6 / CO2 16 / Cr 10.89  -ALT -- / Alk Phos -- / T.Bili --  -INR 1.02 / PTT 34.9        
Interventional Radiology    Evaluate for Procedure: non-tunneled to tunneled hd catheter conversion    HPI: 59y Female with     Allergies: No Known Allergies    Medications (Abx/Cardiac/Anticoagulation/Blood Products)  furosemide   Injectable: 80 milliGRAM(s) IV Push ( @ 05:15)  heparin   Injectable: 5000 Unit(s) SubCutaneous ( @ 05:13)  NIFEdipine XL: 60 milliGRAM(s) Oral ( @ 05:13)    Data:  T(C): 37  HR: 78  BP: 129/81  RR: 18  SpO2: 94%    -WBC 6.30 / HgB 7.5 / Hct 22.6 / Plt 168  -Na 138 / Cl 96 / BUN 42 / Glucose 88  -K 4.1 / CO2 29 / Cr 5.03  -ALT -- / Alk Phos -- / T.Bili --  -INR 0.99 / PTT 31.8    Assessment/Plan: 59F with HTN and advanced CKD stage 5 (biopsy-proven IgA nephropathy in 2019) p/w chest pain. Nephrology consulted for CKD management and is now s/p nontunneled hd catheter placement on  now requesting tunneled HD catheter for outpatient HD.     - case reviewed and approved for Wednesday,   - please place IR procedure order under BLAN[facility] [place]  - STAT labs in AM (cbc,coags, bmp, T&S)  - hold AC x24hrs  - NPO tonight at 11pm  - d/w primary team    Any questions or concerns regarding above please reach out to IR:   -Available on microsoft teams  -During working hours (7a-5p): call [phone]  -Emergent issues after 5pm: page: [phone]  -Non-emergent consults: Please place a [place] order "IR Consult" with an appropriate callback number  -Scheduling questions: [phone]  -Clinic/Outpatient [phone]      XIOMARA Bell- BC  Available on teams      
Interventional Radiology    Evaluate for Procedure:     HPI: 59F with HTN and advanced CKD stage 5 (biopsy-proven IgA nephropathy in 2019) p/w chest pain. Nephrology consulted for CKD management and is now requesting tunneled HD catheter for outpatient HD.     Allergies: No Known Allergies    Medications (Abx/Cardiac/Anticoagulation/Blood Products)  aspirin  chewable: 324 milliGRAM(s) Oral (02-20 @ 09:08)  furosemide   Injectable: 80 milliGRAM(s) IV Push (02-20 @ 13:34)  furosemide   Injectable: 80 milliGRAM(s) IV Push (02-21 @ 05:35)  heparin   Injectable: 5000 Unit(s) SubCutaneous (02-21 @ 05:37)  hydrALAZINE: 25 milliGRAM(s) Oral (02-21 @ 05:37)  NIFEdipine XL: 60 milliGRAM(s) Oral (02-21 @ 05:37)    Data:  T(C): 36.8  HR: 78  BP: 117/65  RR: 18  SpO2: 96%    -WBC 10.49 / HgB 7.6 / Hct 21.4 / Plt 157  -Na 121 / Cl 76 / BUN 99 / Glucose 102  -K 3.6 / CO2 16 / Cr 10.89  -ALT -- / Alk Phos -- / T.Bili --  -INR 1.02 / PTT 34.9    Radiology:     Assessment/Plan:   59F with HTN and advanced CKD stage 5 (biopsy-proven IgA nephropathy in 2019) p/w chest pain. Nephrology consulted for CKD management and is now requesting tunneled HD catheter for outpatient HD.     - d/w nephrology attending, no urgent need for HD today  - case reviewed and approved for tunneled HD catheter placement on Monday 2/24  - please place IR procedure order under BLAN[facility] (free text)   - STAT labs in AM (cbc,coags, bmp, T&S)  - hold HSQ 12 hours prior to procedure with tentative plan to resume AC 12 hours post procedure, if no concern for bleeding  - NPO on 2/23 at 11pm  - d/w primary team

## 2025-02-25 NOTE — PROGRESS NOTE ADULT - ASSESSMENT
59F with HTN and advanced CKD stage 5 (biopsy-proven IgA nephropathy in 2019) p/w chest pain. Nephrology consulted for CKD management.    #CKD stage 5 with uremic s/s and volume overload: Pt. initiated on HD on 2/21 via non tunneled dialysis catheter. pt. tolerated HD well, seen on 2nd session of HD yesterday.  Pt refused PD catheter given she was really drained. PLan for tunnelled HD cath this week and maybe PD catheter later on this week or next week or outpatient.  Pt aware that she can be dc with HD at [facility] and pd can also be done outpatient but daughters and pt may want to get it done this admission      #Hyponatremia with low urine Na and elevated Uosm, likely due to volume overload and possibly component of ADH stimulation triggered by pain  - resolved with HD  - Initiated on HD on 2/21. Had another session of HD today. Monitor labs.     #Hypocalcemia in the setting of Hyperphosphatemia and advanced CKD  - iPTH elevated, in the setting of secondary hyperparathyroidism, levels acceptable for CKD.   - Continue calcium acetate 1334mg TID with meals    #Anemia  - in the setting of advanced CKD  - Follow up results of iron studies. May benefit  JYOTSNA  - Monitor CBC    d/w with pt and daughter and  and Dr Prabha Palma MD  Office   Contact me directly via Microsoft Teams     (After 5 pm or on weekends please page the on-call fellow/attending, can check [email or web address] for schedule. Login is milan teixeira, schedule under [facility] medicine, psych, derm)

## 2025-02-25 NOTE — CONSULT NOTE ADULT - CONSULT REASON
Tunneled HD catheter placement
Advanced CKD
Hyponatremia
PD catheter placement
non-tunneled to tunneled hd catheter conversion

## 2025-02-26 LAB
ALBUMIN SERPL ELPH-MCNC: 3.2 G/DL — LOW (ref 3.3–5)
ALP SERPL-CCNC: 89 U/L — SIGNIFICANT CHANGE UP (ref 40–120)
ALT FLD-CCNC: 13 U/L — SIGNIFICANT CHANGE UP (ref 10–45)
ANION GAP SERPL CALC-SCNC: 15 MMOL/L — SIGNIFICANT CHANGE UP (ref 5–17)
APTT BLD: 31.3 SEC — SIGNIFICANT CHANGE UP (ref 24.5–35.6)
AST SERPL-CCNC: 20 U/L — SIGNIFICANT CHANGE UP (ref 10–40)
BILIRUB SERPL-MCNC: 0.2 MG/DL — SIGNIFICANT CHANGE UP (ref 0.2–1.2)
BUN SERPL-MCNC: 72 MG/DL — HIGH (ref 7–23)
CALCIUM SERPL-MCNC: 8.8 MG/DL — SIGNIFICANT CHANGE UP (ref 8.4–10.5)
CHLORIDE SERPL-SCNC: 97 MMOL/L — SIGNIFICANT CHANGE UP (ref 96–108)
CO2 SERPL-SCNC: 25 MMOL/L — SIGNIFICANT CHANGE UP (ref 22–31)
CREAT SERPL-MCNC: 7 MG/DL — HIGH (ref 0.5–1.3)
EGFR: 6 ML/MIN/1.73M2 — LOW
EGFR: 6 ML/MIN/1.73M2 — LOW
GLUCOSE SERPL-MCNC: 87 MG/DL — SIGNIFICANT CHANGE UP (ref 70–99)
HBV SURFACE AB SER-ACNC: REACTIVE — SIGNIFICANT CHANGE UP
HCT VFR BLD CALC: 20.9 % — CRITICAL LOW (ref 34.5–45)
HCT VFR BLD CALC: 26.2 % — LOW (ref 34.5–45)
HGB BLD-MCNC: 6.8 G/DL — CRITICAL LOW (ref 11.5–15.5)
HGB BLD-MCNC: 8.9 G/DL — LOW (ref 11.5–15.5)
INR BLD: 1.01 RATIO — SIGNIFICANT CHANGE UP (ref 0.85–1.16)
MCHC RBC-ENTMCNC: 30 PG — SIGNIFICANT CHANGE UP (ref 27–34)
MCHC RBC-ENTMCNC: 30.7 PG — SIGNIFICANT CHANGE UP (ref 27–34)
MCHC RBC-ENTMCNC: 32.5 G/DL — SIGNIFICANT CHANGE UP (ref 32–36)
MCHC RBC-ENTMCNC: 34 G/DL — SIGNIFICANT CHANGE UP (ref 32–36)
MCV RBC AUTO: 90.3 FL — SIGNIFICANT CHANGE UP (ref 80–100)
MCV RBC AUTO: 92.1 FL — SIGNIFICANT CHANGE UP (ref 80–100)
NRBC BLD AUTO-RTO: 0 /100 WBCS — SIGNIFICANT CHANGE UP (ref 0–0)
NRBC BLD AUTO-RTO: 0 /100 WBCS — SIGNIFICANT CHANGE UP (ref 0–0)
PLATELET # BLD AUTO: 138 K/UL — LOW (ref 150–400)
PLATELET # BLD AUTO: 142 K/UL — LOW (ref 150–400)
POTASSIUM SERPL-MCNC: 4.1 MMOL/L — SIGNIFICANT CHANGE UP (ref 3.5–5.3)
POTASSIUM SERPL-SCNC: 4.1 MMOL/L — SIGNIFICANT CHANGE UP (ref 3.5–5.3)
PROT SERPL-MCNC: 6.2 G/DL — SIGNIFICANT CHANGE UP (ref 6–8.3)
PROTHROM AB SERPL-ACNC: 11.6 SEC — SIGNIFICANT CHANGE UP (ref 9.9–13.4)
RBC # BLD: 2.27 M/UL — LOW (ref 3.8–5.2)
RBC # BLD: 2.9 M/UL — LOW (ref 3.8–5.2)
RBC # FLD: 13.6 % — SIGNIFICANT CHANGE UP (ref 10.3–14.5)
RBC # FLD: 14.3 % — SIGNIFICANT CHANGE UP (ref 10.3–14.5)
SARS-COV-2 RNA SPEC QL NAA+PROBE: SIGNIFICANT CHANGE UP
SODIUM SERPL-SCNC: 137 MMOL/L — SIGNIFICANT CHANGE UP (ref 135–145)
WBC # BLD: 5.65 K/UL — SIGNIFICANT CHANGE UP (ref 3.8–10.5)
WBC # BLD: 6.86 K/UL — SIGNIFICANT CHANGE UP (ref 3.8–10.5)
WBC # FLD AUTO: 5.65 K/UL — SIGNIFICANT CHANGE UP (ref 3.8–10.5)
WBC # FLD AUTO: 6.86 K/UL — SIGNIFICANT CHANGE UP (ref 3.8–10.5)

## 2025-02-26 PROCEDURE — 99233 SBSQ HOSP IP/OBS HIGH 50: CPT | Mod: GC

## 2025-02-26 PROCEDURE — 36558 INSERT TUNNELED CV CATH: CPT | Mod: RT

## 2025-02-26 PROCEDURE — 99221 1ST HOSP IP/OBS SF/LOW 40: CPT

## 2025-02-26 RX ADMIN — Medication 650 MILLIGRAM(S): at 07:36

## 2025-02-26 RX ADMIN — Medication 650 MILLIGRAM(S): at 17:16

## 2025-02-26 RX ADMIN — FUROSEMIDE 80 MILLIGRAM(S): 10 INJECTION INTRAMUSCULAR; INTRAVENOUS at 17:18

## 2025-02-26 RX ADMIN — Medication 1 APPLICATION(S): at 06:30

## 2025-02-26 RX ADMIN — Medication 60 MILLIGRAM(S): at 07:36

## 2025-02-26 RX ADMIN — Medication 1334 MILLIGRAM(S): at 17:16

## 2025-02-26 RX ADMIN — FUROSEMIDE 80 MILLIGRAM(S): 10 INJECTION INTRAMUSCULAR; INTRAVENOUS at 06:30

## 2025-02-26 NOTE — BH CONSULTATION LIAISON ASSESSMENT NOTE - RISK ASSESSMENT
Risk factors: current medical issues    Protective factors: no current SIIP/HIIP, no h/o SA/SIB, no h/o psych admissions, no access to weapons, no active substance abuse, no psychosis, domiciled, intact marriage, social supports, positive therapeutic relationship, engaged in treatment, compliant with treatment, help-seeking behaviors    Overall, pt is a low risk of harm to self/others and does not meet criteria for psychiatric admission.

## 2025-02-26 NOTE — PROVIDER CONTACT NOTE (CRITICAL VALUE NOTIFICATION) - SITUATION
Pt admitted for hyponatremia requiring new onset dialysis for stage 5 chronic kidney disease
pt with am labs H?H 6.8 / 20.9
critical lab value, sodium 120
pt with am labs  hgb 6.6  Hct 19.7

## 2025-02-26 NOTE — PRE-ANESTHESIA EVALUATION ADULT - NSANTHPMHFT_GEN_ALL_CORE
60 y/o female with pmh of CKD 5 , IGA nephropathy,  hypertension and hyperlipidemia and anemia was volume overloaded iso missing diuretics, now s/p dilaysis feeling better, for temp to perm

## 2025-02-26 NOTE — BH CONSULTATION LIAISON ASSESSMENT NOTE - HPI (INCLUDE ILLNESS QUALITY, SEVERITY, DURATION, TIMING, CONTEXT, MODIFYING FACTORS, ASSOCIATED SIGNS AND SYMPTOMS)
59F, , retired, domiciled in private residence with spouse, PMHx of HTN, HLD, IgA nephropathy, CKD (autoimmune glomerulonephritis), presented to hospital complaining of intermittent pulsating chest pain in the right precordium for the last 2 days, shortness of breath made worse when laying down, and cramping in the legs (torsemide stopped yesterday). She is scheduled for consultation for dialysis next week. Psychiatry consult called hospital day 6 for depression evaluation.  was used to obtain patient history. PCP - Aliza Correa MD   - [facility].    Upon assessment, patient is A&Ox3, primarily Kyrgyz speaking, pleasant and cooperative. Patient presents with euthymic mood, despite expressing frustration and discontent with her current hospital stay. Patient states that she is excited to get out of the hospital and return to her regular life at home. Patient denies SI/HI/NSSIB/pSA, substance abuse, legal issues or access to firearms at home. She also denies any current mood symptoms such as decreased overall mood, anhedonia, poor concentration, sleep difficulties, recent weight changes or current anxiety symptoms such as racing thoughts, muscle tension, irritability. Patient denies history of psychiatric illness or previous psychiatric admissions and states that nobody in her family has been diagnosed with psychiatric illness. She has never experienced hallucinations or other psychotic symptoms. She does not wish to be started on any psychiatric medications at this time.    Patient's son was in the room with patient and was addressed following the patient encounter. Patient's son speaks English. He confirms that patient has not been experiencing significant psychiatric symptoms and reports that nobody in the family has experienced psychiatric illness. Patient's son feels comfortable not pursuing psychiatric medical management at this time.

## 2025-02-26 NOTE — BH CONSULTATION LIAISON ASSESSMENT NOTE - NSICDXPASTMEDICALHX_GEN_ALL_CORE_FT
PAST MEDICAL HISTORY:  Colon cancer screening     H/O bleeding following renal biopsy     H/O transfusion of whole blood     Hyperlipidemia     Hypertension     Proteinuria     Stage 5 chronic kidney disease not on chronic dialysis     Uses Lithuanian as primary spoken language

## 2025-02-26 NOTE — BH CONSULTATION LIAISON ASSESSMENT NOTE - DESCRIPTION
Patient is 60yo female who lives in private residence with spouse. Patient has solid support system of friends and family; patient has 6 children. Patient denies overall feelings of depression and anxiety, but is unhappy with her current circumstances of being in the hospital and having to receive treatment. Patient states that she stays busy at home, doing many different activities that she enjoys. Denies SI/HI/NSSIB/pSA, substance abuse, legal issues or access to firearms at home.

## 2025-02-26 NOTE — PROGRESS NOTE ADULT - ASSESSMENT
58 y/o female with pmh of CKD 5 , IGA nephropathy complaining of intermittent pulsating chest pain in the right precordium for the last 2 days.  Patient also complaining of shortness of breath with is worse when laying down.  She is complaining of cramping in the legs so patient stopped her torsemide yesterday.  She has a history of CKD (autoimmune glomerulonephritis) and is scheduled for consultation for dialysis next week.  She also has a history of hypertension and hyperlipidemia and anemia.  She is currently taking iron, hydralazine, nifedipine, torsemide and bicarb.  Her nephrologist is Dr. Yossi Palma.  She denies any fever or cough.  She denies any recent travel or history of PE/DVT.  She has no known or allergies.  Patient has been voiding normally.   is used for history and physical examination.  PCP - Aliza Correa MD   - [facility]. Daughter states pt primarily cared for by Dr. Palma.     CKD stage 5 with uremic s/s and volume overload: Pt. initiated on HD on 2/21 via non tunneled dialysis catheter. pt. tolerated HD well.  Plan for HD today after tunnelled HD placement  Needs PRBCS now and then cath placement and then HD after that  PD cath placement maybe outpatient or later this week. Pt prefers No HD done the day of PD cath placement if we do it inpatient      #Hyponatremia with low urine Na and elevated Uosm,   - likely due to volume overload and possibly component of ADH stimulation triggered by pain  - resolved with HD  - Initiated on HD on 2/21.   .  Anemia  - s/p  1 unit of prbc  - keep hgb above 7    Hypocalcemia likely contributing to leg cramps  - Please replete calcium,   - check serum phosphorus level.     HTN  - c/w home meds    dvt px  - sq heparin

## 2025-02-26 NOTE — PROCEDURE NOTE - PROCEDURE FINDINGS AND DETAILS
indwelling non tunneled right ij catheter removed over a wire. new right ij tunneled dialysis cathter placed. tip in svc. ok to access.
Ultrasound and fluoroscopic-guided insertion of right internal jugular vein approach non-tunneled hemodialysis catheter.  Catheter tip is in the SVC.  Patient tolerated the procedure well with no complications.  Catheter may be used immediately.

## 2025-02-26 NOTE — BH CONSULTATION LIAISON ASSESSMENT NOTE - NSBHCHARTREVIEWLAB_PSY_A_CORE FT
6.8    5.65  )-----------( 138      ( 26 Feb 2025 07:12 )             20.9   02-26    137  |  97  |  72[H]  ----------------------------<  87  4.1   |  25  |  7.00[H]    Ca    8.8      26 Feb 2025 07:11  Phos  4.0     02-25  Mg     2.1     02-25    TPro  6.2  /  Alb  3.2[L]  /  TBili  0.2  /  DBili  x   /  AST  20  /  ALT  13  /  AlkPhos  89  02-26  Urinalysis Basic - ( 26 Feb 2025 07:11 )    Color: x / Appearance: x / SG: x / pH: x  Gluc: 87 mg/dL / Ketone: x  / Bili: x / Urobili: x   Blood: x / Protein: x / Nitrite: x   Leuk Esterase: x / RBC: x / WBC x   Sq Epi: x / Non Sq Epi: x / Bacteria: x

## 2025-02-26 NOTE — BH CONSULTATION LIAISON ASSESSMENT NOTE - NSBHCHARTREVIEWINVESTIGATE_PSY_A_CORE FT
EKG    Ventricular Rate 93 BPM    Atrial Rate 93 BPM    P-R Interval 144 ms    QRS Duration 84 ms    Q-T Interval 392 ms    QTC Calculation(Bazett) 487 ms    P Axis 70 degrees    R Axis 67 degrees    T Axis 66 degrees    Diagnosis Line NORMAL SINUS RHYTHM  PROLONGED QT  ABNORMAL ECG  WHEN COMPARED WITH ECG OF 02-NOV-2019 20:27,  SIGNIFICANT CHANGES HAVE OCCURRED  Confirmed by MARCIANO YOUSIF PERWAIZ ([id number]) on 2/23/2025 10:42:03 PM

## 2025-02-26 NOTE — BH CONSULTATION LIAISON ASSESSMENT NOTE - SUMMARY
59F, , retired, domiciled in private residence with spouse, PMHx of HTN, HLD, IgA nephropathy, CKD (autoimmune glomerulonephritis), presented to hospital complaining of intermittent pulsating chest pain in the right precordium for the last 2 days, shortness of breath made worse when laying down, and cramping in the legs (torsemide stopped yesterday). She is scheduled for consultation for dialysis next week. Psychiatry consult called hospital day 6 for depression evaluation.  was used to obtain patient history. PCP - Aliza Correa MD   - [facility].    Upon assessment, patient is A&Ox3, primarily Malian speaking, pleasant and cooperative. Patient presents with euthymic mood, despite expressing frustration and discontent with her current hospital stay. Patient states that she is excited to get out of the hospital and return to her regular life at home. Patient denies SI/HI/NSSIB/pSA, substance abuse, legal issues or access to firearms at home. She does not wish to be started on any psychiatric medications at this time. Presentation consistent with adjustment disorder, unspecified.

## 2025-02-26 NOTE — PROVIDER CONTACT NOTE (CRITICAL VALUE NOTIFICATION) - ACTION/TREATMENT ORDERED:
-- DO NOT REPLY / DO NOT REPLY ALL --  -- This inbox is not monitored  -- Message is from Engagement Center Operations (ECO) --    General Patient Message:     Patient sister would like a call back from office regarding trulicity medication. Please assist    Caller Information         Type Contact Phone/Fax    05/14/2024 11:01 AM CDT Phone (Incoming) BRENDA MONTGOMERY (Emergency Contact) [phone]            Alternative phone number: n/a    Can a detailed message be left? Yes - Voicemail      Patient has been advised the message will be addressed within 2-3 business days.            
awaiting further instruction
lab to be redrawn in the a.m, follow plan of care
PA aware. Will continue to monitor daily lab values.
monitor for bleeding

## 2025-02-26 NOTE — PRE PROCEDURE NOTE - PRE PROCEDURE EVALUATION
Interventional Radiology Pre-Procedure Note    Patient is a 59y old Female with renal failure requiring long term dialysis access.    PAST MEDICAL & SURGICAL HISTORY:  Proteinuria      Hyperlipidemia      H/O bleeding following renal biopsy      Stage 5 chronic kidney disease not on chronic dialysis      H/O transfusion of whole blood      Hypertension      Uses Lithuanian as primary spoken language      Colon cancer screening      S/P cholecystectomy      H/O tubal ligation           Allergies: No Known Allergies      LABS:                        6.8    5.65  )-----------( 138      ( 26 Feb 2025 07:12 )             20.9     02-26    137  |  97  |  72[H]  ----------------------------<  87  4.1   |  25  |  7.00[H]    Ca    8.8      26 Feb 2025 07:11  Phos  4.0     02-25  Mg     2.1     02-25    TPro  6.2  /  Alb  3.2[L]  /  TBili  0.2  /  DBili  x   /  AST  20  /  ALT  13  /  AlkPhos  89  02-26    PT/INR - ( 26 Feb 2025 07:07 )   PT: 11.6 sec;   INR: 1.01 ratio         PTT - ( 26 Feb 2025 07:07 )  PTT:31.3 sec    T(C): 36.7 (02-26-25 @ 14:13), Max: 37.1 (02-26-25 @ 00:30)  HR: 61 (02-26-25 @ 15:35) (59 - 89)  BP: 142/81 (02-26-25 @ 15:35) (127/65 - 165/82)  RR: 11 (02-26-25 @ 15:35) (8 - 18)  SpO2: 95% (02-26-25 @ 15:35) (94% - 98%)    Procedure/ risks/ benefits were explained, informed consent obtained from patient, verbalizes understanding.

## 2025-02-26 NOTE — BH CONSULTATION LIAISON ASSESSMENT NOTE - CURRENT MEDICATION
MEDICATIONS  (STANDING):  calcium acetate 1334 milliGRAM(s) Oral three times a day with meals  chlorhexidine 4% Liquid 1 Application(s) Topical <User Schedule>  ferrous    sulfate 325 milliGRAM(s) Oral daily  furosemide   Injectable 80 milliGRAM(s) IV Push two times a day  NIFEdipine XL 60 milliGRAM(s) Oral daily  ondansetron Injectable 4 milliGRAM(s) IV Push once  sodium bicarbonate 650 milliGRAM(s) Oral three times a day    MEDICATIONS  (PRN):  sodium chloride 0.9% lock flush 10 milliLiter(s) IV Push every 1 hour PRN Pre/post blood products, medications, blood draw, and to maintain line patency

## 2025-02-26 NOTE — PROGRESS NOTE ADULT - ASSESSMENT
59F with HTN and advanced CKD stage 5 (biopsy-proven IgA nephropathy in 2019) p/w chest pain. Nephrology consulted for CKD management.    #CKD stage 5 with uremic s/s and volume overload: Pt. initiated on HD on 2/21 via non tunneled dialysis catheter. pt. tolerated HD well.  Plan for HD today after tunnelled HD placement  Needs PRBCS now and then cath placement and then HD after that  PD cath placement maybe outpatient or later this week. Pt prefers No HD done the day of PD cath placement if we do it inpatient      #Hyponatremia with low urine Na and elevated Uosm, likely due to volume overload and possibly component of ADH stimulation triggered by pain  - resolved with HD  - Initiated on HD on 2/21. See above    #Hypocalcemia in the setting of Hyperphosphatemia and advanced CKD  - iPTH elevated, in the setting of secondary hyperparathyroidism, levels acceptable for CKD.   - Continue calcium acetate 1334mg TID with meals    #Anemia  - in the setting of advanced CKD  - Follow up results of iron studies. May benefit  JYOTSNA  - Monitor CBC    d/w with pt and son and NP and Dr Armando  On dc, pt prefers HD at [facility] unit under myself    Yossi Palma MD  Office   Contact me directly via Microsoft Teams     (After 5 pm or on weekends please page the on-call fellow/attending, can check [email or web address] for schedule. Login is milan teixeira, schedule under [facility] medicine, psych, derm)

## 2025-02-26 NOTE — BH CONSULTATION LIAISON ASSESSMENT NOTE - NSBHCHARTREVIEWVS_PSY_A_CORE FT
Vital Signs Last 24 Hrs  T(C): 36.8 (26 Feb 2025 06:20), Max: 37.1 (26 Feb 2025 00:30)  T(F): 98.3 (26 Feb 2025 06:20), Max: 98.8 (26 Feb 2025 00:30)  HR: 66 (26 Feb 2025 06:20) (66 - 89)  BP: 139/78 (26 Feb 2025 06:20) (134/64 - 168/76)  BP(mean): --  RR: 18 (26 Feb 2025 06:20) (18 - 18)  SpO2: 96% (26 Feb 2025 06:20) (94% - 96%)    Parameters below as of 26 Feb 2025 06:20  Patient On (Oxygen Delivery Method): room air

## 2025-02-26 NOTE — BH CONSULTATION LIAISON ASSESSMENT NOTE - NSBHATTESTCOMMENTATTENDFT_PSY_A_CORE
Pt is a 58 y/o   female with hx of ESRD on HD, and psychiatry called for depression. pt seen, AOA x 3,  used, denies acute complaints, no si/hi, no depression or anxiety and sleep and appetite have been fair. pt doing well overall she states. no acute complaints other than her current medical issues. no manic or psychotic symptoms present, and son confirms pt doing well. pt not interested in pursuing psychotropic medications at this time. no si/hi. agree with psych student's A/P above.

## 2025-02-26 NOTE — PROVIDER CONTACT NOTE (CRITICAL VALUE NOTIFICATION) - ASSESSMENT
VSS. Patient in not symptomatic
a&o x4, monitoring vitals
pt lying in bed in nAD
pt sitting in chair in nAD

## 2025-02-26 NOTE — PROVIDER CONTACT NOTE (CRITICAL VALUE NOTIFICATION) - BACKGROUND
came in due to hyponatremia and fluid overload. pending permcath placement
pt admitted for fluid overload
pt admitted with fluid overload , new dialysis

## 2025-02-27 ENCOUNTER — APPOINTMENT (OUTPATIENT)
Dept: TRANSPLANT | Facility: CLINIC | Age: 60
End: 2025-02-27

## 2025-02-27 PROCEDURE — 99233 SBSQ HOSP IP/OBS HIGH 50: CPT | Mod: GC

## 2025-02-27 RX ADMIN — Medication 650 MILLIGRAM(S): at 21:51

## 2025-02-27 RX ADMIN — Medication 325 MILLIGRAM(S): at 12:46

## 2025-02-27 RX ADMIN — Medication 1 APPLICATION(S): at 05:04

## 2025-02-27 RX ADMIN — Medication 60 MILLIGRAM(S): at 05:04

## 2025-02-27 RX ADMIN — Medication 650 MILLIGRAM(S): at 05:04

## 2025-02-27 RX ADMIN — FUROSEMIDE 80 MILLIGRAM(S): 10 INJECTION INTRAMUSCULAR; INTRAVENOUS at 16:33

## 2025-02-27 RX ADMIN — FUROSEMIDE 80 MILLIGRAM(S): 10 INJECTION INTRAMUSCULAR; INTRAVENOUS at 05:04

## 2025-02-27 RX ADMIN — Medication 1334 MILLIGRAM(S): at 08:57

## 2025-02-27 RX ADMIN — Medication 1334 MILLIGRAM(S): at 17:36

## 2025-02-27 RX ADMIN — Medication 1334 MILLIGRAM(S): at 12:46

## 2025-02-27 RX ADMIN — Medication 325 MILLIGRAM(S): at 00:17

## 2025-02-27 RX ADMIN — Medication 650 MILLIGRAM(S): at 14:02

## 2025-02-27 RX ADMIN — Medication 650 MILLIGRAM(S): at 00:18

## 2025-02-27 NOTE — PROGRESS NOTE ADULT - ASSESSMENT
58 y/o female with pmh of CKD 5 , IGA nephropathy complaining of intermittent pulsating chest pain in the right precordium for the last 2 days.  Patient also complaining of shortness of breath with is worse when laying down.  She is complaining of cramping in the legs so patient stopped her torsemide yesterday.  She has a history of CKD (autoimmune glomerulonephritis) and is scheduled for consultation for dialysis next week.  She also has a history of hypertension and hyperlipidemia and anemia.  She is currently taking iron, hydralazine, nifedipine, torsemide and bicarb.  Her nephrologist is Dr. Yossi Palma.  She denies any fever or cough.  She denies any recent travel or history of PE/DVT.  She has no known or allergies.  Patient has been voiding normally.   is used for history and physical examination.  PCP - Aliza Correa MD   - [facility]. Daughter states pt primarily cared for by Dr. Palma.     CKD stage 5 with uremic s/s and volume overload: Pt. initiated on HD on 2/21 via non tunneled dialysis catheter. pt. tolerated HD well.  had HD yesterday with new tunnelled HD catheter done on 2/26  plan for HD again today as PD cath placement planned for tomorrow  NPO after midnight  D/w with Dr Armando and daughter and pt       #Hyponatremia with low urine Na and elevated Uosm,   - likely due to volume overload and possibly component of ADH stimulation triggered by pain  - resolved with HD  - Initiated on HD on 2/21.   .  Anemia  - s/p  1 unit of prbc  - keep hgb above 7    Hypocalcemia likely contributing to leg cramps  - Please replete calcium,   - check serum phosphorus level.     HTN  - c/w home meds    dvt px  - sq heparin

## 2025-02-27 NOTE — PROGRESS NOTE ADULT - ASSESSMENT
59F with HTN and advanced CKD stage 5 (biopsy-proven IgA nephropathy in 2019) p/w chest pain. Nephrology consulted for CKD management.    #CKD stage 5 with uremic s/s and volume overload: Pt. initiated on HD on 2/21 via non tunneled dialysis catheter. pt. tolerated HD well.  had HD yesterday with new tunnelled HD catheter done on 2/26  plan for HD again today as PD cath placement planned for tomorrow  NPO after midnight  D/w with Dr Armando and daughter and pt     #Hyponatremia with low urine Na and elevated Uosm, likely due to volume overload and possibly component of ADH stimulation triggered by pain  - resolved with HD    #Hypocalcemia in the setting of Hyperphosphatemia and advanced CKD  - iPTH elevated, in the setting of secondary hyperparathyroidism, levels acceptable for CKD.   - Continue calcium acetate 1334mg TID with meals    #Anemia  - in the setting of advanced CKD  - Follow up results of iron studies. May benefit  JYOTSNA  - Monitor CBC    On dc, pt prefers HD at [facility] and awaiting quant to plan dc to outpt unit    Yossi Palma MD  Office   Contact me directly via Microsoft Teams     (After 5 pm or on weekends please page the on-call fellow/attending, can check [email or web address] for schedule. Login is milan teixeira, schedule under [facility] medicine, psych, derm)

## 2025-02-27 NOTE — CHART NOTE - NSCHARTNOTEFT_GEN_A_CORE
Spoke with patient's daughter on phone Cee John - explained her that Dr. Armando is okay to place PD cath tomorrow.   She wanted me to explain it to her mom as well.  Spoke with patient and another daughter at bedside - explained PD cath can be placed tomorrow by the transplant surgery team and we can do HD today.   Pt did not want PD cath placement for now. She said that she wanted to get better now and would get PD cath later.       Discussed with the primary team.   No HD for today. Will do HD tomorrow. Spoke with patient's daughter on phone Dora Cee - explained her that PD surgeon is okay to place PD cath tomorrow.   She wanted me to explain it to her mom as well.  Spoke with patient and another daughter at bedside - explained PD cath can be placed tomorrow by the transplant surgery team and we can do HD today.   Pt did not want PD cath placement for now. She said that she wanted to get better now and would get PD cath later.       Discussed with the primary team.   No HD for today. Will do HD tomorrow.

## 2025-02-28 PROCEDURE — 99233 SBSQ HOSP IP/OBS HIGH 50: CPT | Mod: GC

## 2025-02-28 RX ADMIN — Medication 650 MILLIGRAM(S): at 04:48

## 2025-02-28 RX ADMIN — Medication 650 MILLIGRAM(S): at 15:47

## 2025-02-28 RX ADMIN — Medication 1334 MILLIGRAM(S): at 15:47

## 2025-02-28 RX ADMIN — Medication 1334 MILLIGRAM(S): at 09:26

## 2025-02-28 RX ADMIN — Medication 650 MILLIGRAM(S): at 22:50

## 2025-02-28 RX ADMIN — FUROSEMIDE 80 MILLIGRAM(S): 10 INJECTION INTRAMUSCULAR; INTRAVENOUS at 04:47

## 2025-02-28 RX ADMIN — FUROSEMIDE 80 MILLIGRAM(S): 10 INJECTION INTRAMUSCULAR; INTRAVENOUS at 15:47

## 2025-02-28 RX ADMIN — Medication 60 MILLIGRAM(S): at 03:39

## 2025-02-28 RX ADMIN — Medication 1 APPLICATION(S): at 04:49

## 2025-02-28 RX ADMIN — Medication 325 MILLIGRAM(S): at 15:47

## 2025-02-28 NOTE — PROGRESS NOTE ADULT - ASSESSMENT
60 y/o female with pmh of CKD 5 , IGA nephropathy complaining of intermittent pulsating chest pain in the right precordium for the last 2 days.  Patient also complaining of shortness of breath with is worse when laying down.  She is complaining of cramping in the legs so patient stopped her torsemide yesterday.  She has a history of CKD (autoimmune glomerulonephritis) and is scheduled for consultation for dialysis next week.  She also has a history of hypertension and hyperlipidemia and anemia.  She is currently taking iron, hydralazine, nifedipine, torsemide and bicarb.  Her nephrologist is Dr. Yossi Palma.  She denies any fever or cough.  She denies any recent travel or history of PE/DVT.  She has no known or allergies.  Patient has been voiding normally.   is used for history and physical examination.  PCP - Aliza Correa MD   - [facility]. Daughter states pt primarily cared for by Dr. Palma.     CKD stage 5 with uremic s/s and volume overload: Pt. initiated on HD on 2/21 via non tunneled dialysis catheter. pt. tolerated HD well.  had HD yesterday with new tunnelled HD catheter done on 2/26  plan for HD again today as PD cath placement planned as out pt after discharge  out pf t HD to be set up  await QuantiFeron gold    #Hyponatremia with low urine Na and elevated Uosm,   - likely due to volume overload and possibly component of ADH stimulation triggered by pain  - resolved with HD  - Initiated on HD on 2/21.   .  Anemia  - s/p transfusion  - keep hgb above 7    Hypocalcemia likely contributing to leg cramps  - Please replete calcium,   - check serum phosphorus level.     HTN  - c/w home meds    dvt px  - sq heparin

## 2025-02-28 NOTE — PROGRESS NOTE ADULT - ASSESSMENT
59F with HTN and advanced CKD stage 5 (biopsy-proven IgA nephropathy in 2019) p/w chest pain. Nephrology consulted for CKD management.    #CKD stage 5 with uremic s/s and volume overload: Pt. initiated on HD on 2/21 via non tunneled dialysis catheter. pt. tolerated HD well.  had HD yesterday with new tunnelled HD catheter done on 2/26  refusing PD cath placement. plan for HD today  pt has MWF spot at [facility] at 630pm,  can be discharged if has quant come back this weekend    #Hyponatremia with low urine Na and elevated Uosm, likely due to volume overload and possibly component of ADH stimulation triggered by pain  - resolved with HD  -dc IV diuretics, change to torsemide 40mg qd on non dialysis days    #Hypocalcemia in the setting of Hyperphosphatemia and advanced CKD  - iPTH elevated, in the setting of secondary hyperparathyroidism, levels acceptable for CKD.   - Continue calcium acetate 1334mg TID with meals    #Anemia  - in the setting of advanced CKD  - Can give IV iron and JYOTSNA as outpt on dc no urgency now    For weekend coverage, please call Dr. Lester Metcalf( fellow) or Dr Zee Friedman( Attending)      Yossi Palma MD  Office   Contact me directly via Microsoft Teams     (After 5 pm or on weekends please page the on-call fellow/attending, can check [email or web address] for schedule. Login is milan teixeira, schedule under [facility] medicine, psych, derm)

## 2025-03-01 RX ORDER — TORSEMIDE 10 MG
40 TABLET ORAL
Refills: 0 | Status: DISCONTINUED | OUTPATIENT
Start: 2025-03-01 | End: 2025-03-04

## 2025-03-01 RX ADMIN — Medication 650 MILLIGRAM(S): at 21:08

## 2025-03-01 RX ADMIN — FUROSEMIDE 80 MILLIGRAM(S): 10 INJECTION INTRAMUSCULAR; INTRAVENOUS at 06:42

## 2025-03-01 RX ADMIN — Medication 1334 MILLIGRAM(S): at 12:50

## 2025-03-01 RX ADMIN — Medication 325 MILLIGRAM(S): at 12:49

## 2025-03-01 RX ADMIN — Medication 60 MILLIGRAM(S): at 06:42

## 2025-03-01 RX ADMIN — Medication 40 MILLIGRAM(S): at 21:08

## 2025-03-01 RX ADMIN — Medication 650 MILLIGRAM(S): at 06:43

## 2025-03-01 RX ADMIN — Medication 650 MILLIGRAM(S): at 15:37

## 2025-03-01 RX ADMIN — Medication 1 APPLICATION(S): at 06:45

## 2025-03-01 RX ADMIN — Medication 1334 MILLIGRAM(S): at 17:21

## 2025-03-01 RX ADMIN — Medication 1334 MILLIGRAM(S): at 09:34

## 2025-03-01 NOTE — PROGRESS NOTE ADULT - ASSESSMENT
60 y/o female with pmh of CKD 5 , IGA nephropathy complaining of intermittent pulsating chest pain in the right precordium for the last 2 days.  Patient also complaining of shortness of breath with is worse when laying down.  She is complaining of cramping in the legs so patient stopped her torsemide yesterday.  She has a history of CKD (autoimmune glomerulonephritis) and is scheduled for consultation for dialysis next week.  She also has a history of hypertension and hyperlipidemia and anemia.  She is currently taking iron, hydralazine, nifedipine, torsemide and bicarb.  Her nephrologist is Dr. Yossi Palma.  She denies any fever or cough.  She denies any recent travel or history of PE/DVT.  She has no known or allergies.  Patient has been voiding normally.   is used for history and physical examination.  PCP - Aliza Correa MD   - [facility]. Daughter states pt primarily cared for by Dr. Palma.     CKD stage 5 with uremic s/s and volume overload: Pt. initiated on HD on 2/21 via non tunneled dialysis catheter. pt. tolerated HD well.  had HD yesterday with new tunnelled HD catheter done on 2/26  plan for HD again today as PD cath placement planned as out pt after discharge  out pf t HD to be set up  await QuantiFeron gold    #Hyponatremia with low urine Na and elevated Uosm,   - likely due to volume overload and possibly component of ADH stimulation triggered by pain  - resolved with HD  - Initiated on HD on 2/21.   .  Anemia  - s/p transfusion  - keep hgb above 7    Hypocalcemia likely contributing to leg cramps  - Please replete calcium,   - check serum phosphorus level.     HTN  - c/w home meds    dvt px  - sq heparin

## 2025-03-02 RX ADMIN — Medication 1334 MILLIGRAM(S): at 17:08

## 2025-03-02 RX ADMIN — Medication 650 MILLIGRAM(S): at 05:12

## 2025-03-02 RX ADMIN — Medication 40 MILLIGRAM(S): at 09:03

## 2025-03-02 RX ADMIN — Medication 650 MILLIGRAM(S): at 13:39

## 2025-03-02 RX ADMIN — Medication 1 APPLICATION(S): at 05:12

## 2025-03-02 RX ADMIN — Medication 325 MILLIGRAM(S): at 11:19

## 2025-03-02 RX ADMIN — Medication 1334 MILLIGRAM(S): at 11:20

## 2025-03-02 RX ADMIN — Medication 60 MILLIGRAM(S): at 05:12

## 2025-03-02 RX ADMIN — Medication 1334 MILLIGRAM(S): at 09:03

## 2025-03-02 RX ADMIN — Medication 650 MILLIGRAM(S): at 21:01

## 2025-03-02 NOTE — PROGRESS NOTE ADULT - ASSESSMENT
60 y/o female with pmh of CKD 5 , IGA nephropathy complaining of intermittent pulsating chest pain in the right precordium for the last 2 days.  Patient also complaining of shortness of breath with is worse when laying down.  She is complaining of cramping in the legs so patient stopped her torsemide yesterday.  She has a history of CKD (autoimmune glomerulonephritis) and is scheduled for consultation for dialysis next week.  She also has a history of hypertension and hyperlipidemia and anemia.  She is currently taking iron, hydralazine, nifedipine, torsemide and bicarb.  Her nephrologist is Dr. Yossi Palma.  She denies any fever or cough.  She denies any recent travel or history of PE/DVT.  She has no known or allergies.  Patient has been voiding normally.   is used for history and physical examination.  PCP - Aliza Correa MD   - [facility]. Daughter states pt primarily cared for by Dr. Palma.     CKD stage 5 with uremic s/s and volume overload: Pt. initiated on HD on 2/21 via non tunneled dialysis catheter. pt. tolerated HD well.  had HD yesterday with new tunnelled HD catheter done on 2/26  plan for HD again today as PD cath placement planned as out pt after discharge  out pf t HD to be set up  await QuantiFeron gold    Hyponatremia with low urine Na and elevated Uosm,   - likely due to volume overload and possibly component of ADH stimulation triggered by pain  - resolved with HD  - Initiated on HD on 2/21.   .  Anemia  - s/p transfusion  - keep hgb above 7    Hypocalcemia likely contributing to leg cramps  - Please replete calcium,   - check serum phosphorus level.     HTN  - c/w home meds    dvt px  - sq heparin

## 2025-03-03 LAB
ANION GAP SERPL CALC-SCNC: 18 MMOL/L — HIGH (ref 5–17)
BUN SERPL-MCNC: 94 MG/DL — HIGH (ref 7–23)
CALCIUM SERPL-MCNC: 8.6 MG/DL — SIGNIFICANT CHANGE UP (ref 8.4–10.5)
CHLORIDE SERPL-SCNC: 96 MMOL/L — SIGNIFICANT CHANGE UP (ref 96–108)
CO2 SERPL-SCNC: 24 MMOL/L — SIGNIFICANT CHANGE UP (ref 22–31)
CREAT SERPL-MCNC: 8.3 MG/DL — HIGH (ref 0.5–1.3)
EGFR: 5 ML/MIN/1.73M2 — LOW
EGFR: 5 ML/MIN/1.73M2 — LOW
GAMMA INTERFERON BACKGROUND BLD IA-ACNC: 0.04 IU/ML — SIGNIFICANT CHANGE UP
GLUCOSE SERPL-MCNC: 93 MG/DL — SIGNIFICANT CHANGE UP (ref 70–99)
HCT VFR BLD CALC: 23.3 % — LOW (ref 34.5–45)
HGB BLD-MCNC: 7.7 G/DL — LOW (ref 11.5–15.5)
M TB IFN-G BLD-IMP: NEGATIVE — SIGNIFICANT CHANGE UP
M TB IFN-G CD4+ BCKGRND COR BLD-ACNC: 0 IU/ML — SIGNIFICANT CHANGE UP
M TB IFN-G CD4+CD8+ BCKGRND COR BLD-ACNC: 0.01 IU/ML — SIGNIFICANT CHANGE UP
MCHC RBC-ENTMCNC: 29.8 PG — SIGNIFICANT CHANGE UP (ref 27–34)
MCHC RBC-ENTMCNC: 33 G/DL — SIGNIFICANT CHANGE UP (ref 32–36)
MCV RBC AUTO: 90.3 FL — SIGNIFICANT CHANGE UP (ref 80–100)
NRBC BLD AUTO-RTO: 0 /100 WBCS — SIGNIFICANT CHANGE UP (ref 0–0)
PLATELET # BLD AUTO: 131 K/UL — LOW (ref 150–400)
POTASSIUM SERPL-MCNC: 4 MMOL/L — SIGNIFICANT CHANGE UP (ref 3.5–5.3)
POTASSIUM SERPL-SCNC: 4 MMOL/L — SIGNIFICANT CHANGE UP (ref 3.5–5.3)
QUANT TB PLUS MITOGEN MINUS NIL: 1.49 IU/ML — SIGNIFICANT CHANGE UP
RBC # BLD: 2.58 M/UL — LOW (ref 3.8–5.2)
RBC # FLD: 13.2 % — SIGNIFICANT CHANGE UP (ref 10.3–14.5)
SODIUM SERPL-SCNC: 138 MMOL/L — SIGNIFICANT CHANGE UP (ref 135–145)
WBC # BLD: 6.18 K/UL — SIGNIFICANT CHANGE UP (ref 3.8–10.5)
WBC # FLD AUTO: 6.18 K/UL — SIGNIFICANT CHANGE UP (ref 3.8–10.5)

## 2025-03-03 PROCEDURE — 90935 HEMODIALYSIS ONE EVALUATION: CPT | Mod: GC

## 2025-03-03 RX ORDER — EPOETIN ALFA 10000 [IU]/ML
4000 SOLUTION INTRAVENOUS; SUBCUTANEOUS
Refills: 0 | Status: DISCONTINUED | OUTPATIENT
Start: 2025-03-03 | End: 2025-03-04

## 2025-03-03 RX ADMIN — Medication 650 MILLIGRAM(S): at 06:04

## 2025-03-03 RX ADMIN — Medication 1334 MILLIGRAM(S): at 11:54

## 2025-03-03 RX ADMIN — Medication 1334 MILLIGRAM(S): at 17:01

## 2025-03-03 RX ADMIN — Medication 60 MILLIGRAM(S): at 06:04

## 2025-03-03 RX ADMIN — Medication 650 MILLIGRAM(S): at 21:52

## 2025-03-03 RX ADMIN — Medication 1 APPLICATION(S): at 06:04

## 2025-03-03 RX ADMIN — Medication 650 MILLIGRAM(S): at 13:06

## 2025-03-03 RX ADMIN — Medication 325 MILLIGRAM(S): at 11:54

## 2025-03-03 NOTE — PROGRESS NOTE ADULT - ASSESSMENT
60 y/o female with pmh of CKD 5 , IGA nephropathy complaining of intermittent pulsating chest pain in the right precordium for the last 2 days.  Patient also complaining of shortness of breath with is worse when laying down.  She is complaining of cramping in the legs so patient stopped her torsemide yesterday.  She has a history of CKD (autoimmune glomerulonephritis) and is scheduled for consultation for dialysis next week.  She also has a history of hypertension and hyperlipidemia and anemia.  She is currently taking iron, hydralazine, nifedipine, torsemide and bicarb.  Her nephrologist is Dr. Yossi Palma.  She denies any fever or cough.  She denies any recent travel or history of PE/DVT.  She has no known or allergies.  Patient has been voiding normally.   is used for history and physical examination.  PCP - Aliza Correa MD   - [facility]. Daughter states pt primarily cared for by Dr. Palma.     CKD stage 5 with uremic s/s and volume overload: Pt. initiated on HD on 2/21 via non tunneled dialysis catheter. pt. tolerated HD well.  had HD yesterday with new tunnelled HD catheter done on 2/26  plan for HD again today as PD cath placement planned as out pt after discharge  out pf t HD to be set up  await QuantiFeron gold    Hyponatremia with low urine Na and elevated Uosm,   - likely due to volume overload and possibly component of ADH stimulation triggered by pain  - resolved with HD  - Initiated on HD on 2/21.   .  Anemia  - s/p transfusion  - keep hgb above 7    Hypocalcemia likely contributing to leg cramps  - Please replete calcium,   - check serum phosphorus level.     HTN  - c/w home meds    dvt px  - sq heparin

## 2025-03-03 NOTE — PROGRESS NOTE ADULT - ATTENDING COMMENTS
seen on dialysis   tolerating well   all questions answered  planned to go to [facility] with [facility]. fajardo - [facility]   can be discharged once quant results are back     winter montes  nephrology attending   please contact me on TEAMS   Office- [phone]

## 2025-03-03 NOTE — PROGRESS NOTE ADULT - ASSESSMENT
59F with HTN and advanced CKD stage 5 (biopsy-proven IgA nephropathy in 2019) p/w chest pain. Admitted for hyponatremia, and dialysis initiation.     1. CKD stage 5 with uremic s/s and volume overload, now likely ESRD.  Pt. initiated on HD on 2/21 via non tunneled dialysis catheter. Underwent RIJ TDC placement on 2/26. Last HD was on 2/28. Pt seen while on HD today, tolerating treatment and TDC functioning well. Was initially planning for PD, but pt now refusing and will consider as outpt. For now plan to continue HD, next HD on 3/5.   Pt has MWF spot at [facility] at 630pm, can be discharged once quant results.     2. Hyponatremia: resolved with HD  -Continue torsemide 40mg qd on non dialysis days    3. Hypocalcemia in the setting of Hyperphosphatemia and advanced CKD  - iPTH elevated 377, in the setting of secondary hyperparathyroidism, levels in acceptable range   - Continue calcium acetate 1334mg TID with meals, check phos lvl daily    4. Anemia  - Will dose JYOTSNA w/ HD   - Can give IV iron as outpt on dc no urgency to start now    If you have any questions, please feel free to contact me  Felicity Sidhu  Nephrology Fellow  [email or web address]  (After 5pm or on weekends please page the on-call fellow)

## 2025-03-04 ENCOUNTER — TRANSCRIPTION ENCOUNTER (OUTPATIENT)
Age: 60
End: 2025-03-04

## 2025-03-04 VITALS
RESPIRATION RATE: 20 BRPM | TEMPERATURE: 98 F | HEART RATE: 80 BPM | DIASTOLIC BLOOD PRESSURE: 87 MMHG | SYSTOLIC BLOOD PRESSURE: 154 MMHG

## 2025-03-04 RX ORDER — FERROUS SULFATE 137(45) MG
1 TABLET, EXTENDED RELEASE ORAL
Refills: 0 | DISCHARGE

## 2025-03-04 RX ORDER — EPOETIN ALFA 10000 [IU]/ML
4000 SOLUTION INTRAVENOUS; SUBCUTANEOUS
Qty: 0 | Refills: 0 | DISCHARGE

## 2025-03-04 RX ORDER — NIFEDIPINE 30 MG
1 TABLET, EXTENDED RELEASE 24 HR ORAL
Qty: 0 | Refills: 0 | DISCHARGE
Start: 2025-03-04

## 2025-03-04 RX ORDER — FERROUS SULFATE 137(45) MG
1 TABLET, EXTENDED RELEASE ORAL
Qty: 0 | Refills: 0 | DISCHARGE
Start: 2025-03-04

## 2025-03-04 RX ORDER — TORSEMIDE 10 MG
2 TABLET ORAL
Refills: 0 | DISCHARGE

## 2025-03-04 RX ORDER — NIFEDIPINE 30 MG
1 TABLET, EXTENDED RELEASE 24 HR ORAL
Refills: 0 | DISCHARGE

## 2025-03-04 RX ADMIN — Medication 325 MILLIGRAM(S): at 11:18

## 2025-03-04 RX ADMIN — Medication 60 MILLIGRAM(S): at 05:11

## 2025-03-04 RX ADMIN — Medication 40 MILLIGRAM(S): at 11:19

## 2025-03-04 RX ADMIN — Medication 1334 MILLIGRAM(S): at 11:20

## 2025-03-04 RX ADMIN — Medication 1334 MILLIGRAM(S): at 17:01

## 2025-03-04 RX ADMIN — Medication 650 MILLIGRAM(S): at 05:11

## 2025-03-04 RX ADMIN — Medication 1 APPLICATION(S): at 05:14

## 2025-03-04 RX ADMIN — Medication 650 MILLIGRAM(S): at 12:35

## 2025-03-04 NOTE — DISCHARGE NOTE NURSING/CASE MANAGEMENT/SOCIAL WORK - FINANCIAL ASSISTANCE
[facility] provides services at a reduced cost to those who are determined to be eligible through [facility]’s financial assistance program. Information regarding [facility]’s financial assistance program can be found by going to [email or web address] or by calling [phone].

## 2025-03-04 NOTE — DISCHARGE NOTE PROVIDER - NSDCMRMEDTOKEN_GEN_ALL_CORE_FT
ferrous sulfate 325 mg (65 mg elemental iron) oral tablet: 1 tab(s) orally once a day  hydrALAZINE 25 mg oral tablet: 1 tab(s) orally 2 times a day  NIFEdipine 60 mg oral tablet, extended release: 1 tab(s) orally once a day  sodium bicarbonate 650 mg oral tablet: 1 tab(s) orally 3 times a day (with meals)  torsemide 20 mg oral tablet: 2 tab(s) orally once a day  Vitamin D tablet: 1 tablet orally once a day   calcium acetate 667 mg oral tablet: 2 tab(s) orally 3 times a day (with meals)  Epogen 4000 units/mL preservative-free injectable solution: 4,000 unit(s) intravenously 3 times a week Monday, Wednesday, Friday  ferrous sulfate 325 mg (65 mg elemental iron) oral tablet: 1 tab(s) orally once a day  hydrALAZINE 25 mg oral tablet: 1 tab(s) orally 2 times a day  NIFEdipine 60 mg oral tablet, extended release: 1 tab(s) orally once a day  sodium bicarbonate 650 mg oral tablet: 1 tab(s) orally 3 times a day (with meals)  torsemide 40 mg oral tablet: 1 tab(s) orally once a day  Vitamin D tablet: 1 tablet orally once a day

## 2025-03-04 NOTE — DISCHARGE NOTE PROVIDER - CARE PROVIDER_API CALL
SHAHZAD GARCIA  [address] [place] [address]  Phone: [phone]  Fax: [phone]  Follow Up Time:

## 2025-03-04 NOTE — DISCHARGE NOTE PROVIDER - HOSPITAL COURSE
HPI:  58 y/o female with pmh of CKD 5 , IGA nephropathy complaining of intermittent pulsating chest pain in the right precordium for the last 2 days.  Patient also complaining of shortness of breath with is worse when laying down.  She is complaining of cramping in the legs so patient stopped her torsemide yesterday.  She has a history of CKD (autoimmune glomerulonephritis) and is scheduled for consultation for dialysis next week.  She also has a history of hypertension and hyperlipidemia and anemia.  She is currently taking iron, hydralazine, nifedipine, torsemide and bicarb.  Her nephrologist is Dr. Yossi Palma.  She denies any fever or cough.  She denies any recent travel or history of PE/DVT.  She has no known or allergies.  Patient has been voiding normally.   is used for history and physical examination.  PCP - Aliza Correa MD   - [facility]. Daughter states pt primarily cared for by Dr. Palma.    (20 Feb 2025 16:17)    Hospital Course:      Important Medication Changes and Reason:    Active or Pending Issues Requiring Follow-up:    Advanced Directives:   [ ] Full code  [ ] DNR  [ ] Hospice    Discharge Diagnoses:         HPI:  60 y/o female with pmh of CKD 5 , IGA nephropathy complaining of intermittent pulsating chest pain in the right precordium for the last 2 days.  Patient also complaining of shortness of breath with is worse when laying down.  She is complaining of cramping in the legs so patient stopped her torsemide yesterday.  She has a history of CKD (autoimmune glomerulonephritis) and is scheduled for consultation for dialysis next week.  She also has a history of hypertension and hyperlipidemia and anemia.  She is currently taking iron, hydralazine, nifedipine, torsemide and bicarb.  Her nephrologist is Dr. Yossi Palma.  She denies any fever or cough.  She denies any recent travel or history of PE/DVT.  She has no known or allergies.  Patient has been voiding normally.   is used for history and physical examination.  PCP - Aliza Correa MD   - [facility]. Daughter states pt primarily cared for by Dr. Palma.    (20 Feb 2025 16:17)    Hospital Course:    59F with HTN and advanced CKD stage 5 (biopsy-proven IgA nephropathy in 2019) p/w chest pain. Admitted for hyponatremia, and dialysis initiation.     1. CKD stage 5 with uremic s/s and volume overload, now likely ESRD.  Pt. initiated on HD on 2/21 via non tunneled dialysis catheter. Underwent RIJ TDC placement on 2/26. Was initially planning for PD, but pt now refusing and will consider as outpt. For now plan to continue HD, next HD on 3/5.   Pt has MWF spot at [facility] at 630pm, can be discharged once quantiferone test  resulted.     2. Hyponatremia: resolved with HD  -Continue torsemide 40mg qd on non dialysis days    3. Hypocalcemia in the setting of Hyperphosphatemia and advanced CKD  - iPTH elevated 377, in the setting of secondary hyperparathyroidism, levels in acceptable range   - Continue calcium acetate 1334mg TID with meals.    4. Anemia  - Will dose JYOTSNA w/ HD   - Can give IV iron as outpt on dc no urgency to start now  Patient cleared for discharge home . Awaiting quantiferone test result        Important Medication Changes and Reason:    Active or Pending Issues Requiring Follow-up:  Renal  Advanced Directives:   [x ] Full code  [ ] DNR  [ ] Hospice    Discharge Diagnoses:  ESRD  Anemia  Hyponatremia  Hypocalcemia         HPI:  58 y/o female with pmh of CKD 5 , IGA nephropathy complaining of intermittent pulsating chest pain in the right precordium for the last 2 days.  Patient also complaining of shortness of breath with is worse when laying down.  She is complaining of cramping in the legs so patient stopped her torsemide yesterday.  She has a history of CKD (autoimmune glomerulonephritis) and is scheduled for consultation for dialysis next week.  She also has a history of hypertension and hyperlipidemia and anemia.  She is currently taking iron, hydralazine, nifedipine, torsemide and bicarb.  Her nephrologist is Dr. Yossi Palma.  She denies any fever or cough.  She denies any recent travel or history of PE/DVT.  She has no known or allergies.  Patient has been voiding normally.   is used for history and physical examination.  PCP - Aliza Correa MD   - [facility]. Daughter states pt primarily cared for by Dr. Palma.    (20 Feb 2025 16:17)    Hospital Course:    59F with HTN and advanced CKD stage 5 (biopsy-proven IgA nephropathy in 2019) p/w chest pain. Admitted for hyponatremia, and dialysis initiation.     1. CKD stage 5 with uremic s/s and volume overload, now likely ESRD.  Pt. initiated on HD on 2/21 via non tunneled dialysis catheter. Underwent RIJ TDC placement on 2/26. Was initially planning for PD, but pt now refusing and will consider as outpt. For now plan to continue HD, next HD on 3/5.   Pt has MWF spot at [facility] at 630pm.    2. Hyponatremia: resolved with HD  -Continue torsemide 40mg qd on non dialysis days    3. Hypocalcemia in the setting of Hyperphosphatemia and advanced CKD  - iPTH elevated 377, in the setting of secondary hyperparathyroidism, levels in acceptable range   - Continue calcium acetate 1334mg TID with meals.    4. Anemia  - Will dose JYOTSNA w/ HD   - Can give IV iron as outpt on dc no urgency to start now  Quantiferone test negative. Cleared for discharge with out patient HD.        Important Medication Changes and Reason:    Active or Pending Issues Requiring Follow-up:  Renal  Advanced Directives:   [x ] Full code  [ ] DNR  [ ] Hospice    Discharge Diagnoses:  ESRD  Anemia  Hyponatremia  Hypocalcemia

## 2025-03-04 NOTE — PROGRESS NOTE ADULT - PROVIDER SPECIALTY LIST ADULT
Internal Medicine
Nephrology
Internal Medicine
Nephrology
Nephrology
Internal Medicine
Nephrology
Nephrology
Internal Medicine
Nephrology

## 2025-03-04 NOTE — DISCHARGE NOTE NURSING/CASE MANAGEMENT/SOCIAL WORK - ADDITIONAL RESOURCE TYPE OF SERVICE
[employer] (3/5 pickup 12pm, 3/7 pickup 430pm, 3/10 pickup 430pm) Allowed one person to accompany

## 2025-03-04 NOTE — PROGRESS NOTE ADULT - ASSESSMENT
60 y/o female with pmh of CKD 5 , IGA nephropathy complaining of intermittent pulsating chest pain in the right precordium for the last 2 days.  Patient also complaining of shortness of breath with is worse when laying down.  She is complaining of cramping in the legs so patient stopped her torsemide yesterday.  She has a history of CKD (autoimmune glomerulonephritis) and is scheduled for consultation for dialysis next week.  She also has a history of hypertension and hyperlipidemia and anemia.  She is currently taking iron, hydralazine, nifedipine, torsemide and bicarb.  Her nephrologist is Dr. Yossi Palma.  She denies any fever or cough.  She denies any recent travel or history of PE/DVT.  She has no known or allergies.  Patient has been voiding normally.   is used for history and physical examination.  PCP - Aliza Correa MD   - [facility]. Daughter states pt primarily cared for by Dr. Palma.     CKD stage 5 with uremic s/s and volume overload: Pt. initiated on HD on 2/21 via non tunneled dialysis catheter. pt. tolerated HD well.  had HD yesterday with new tunnelled HD catheter done on 2/26  plan for HD again today as PD cath placement planned as out pt after discharge  out pf t HD to be set up   QuantiFeron gold negative    Hyponatremia with low urine Na and elevated Uosm,   - likely due to volume overload and possibly component of ADH stimulation triggered by pain  - resolved with HD  - Initiated on HD on 2/21.   .  Anemia  - s/p transfusion  - keep hgb above 7    Hypocalcemia likely contributing to leg cramps  - Please replete calcium,   - check serum phosphorus level.     HTN  - c/w home meds    dvt px  - sq heparin    d/c planning

## 2025-03-04 NOTE — PROGRESS NOTE ADULT - SUBJECTIVE AND OBJECTIVE BOX
[facility] DIVISION OF KIDNEY DISEASES AND HYPERTENSION --    Reason for consult: Advanced CKD    24 hour events/subjective: Patient seen and examined during HD. States LE edema has improved. Denies SOB, has mild chest wall tenderness. No n/v/d.      PAST HISTORY  --------------------------------------------------------------------------------  No significant changes to PMH, PSH, FHx, SHx, unless otherwise noted    ALLERGIES & MEDICATIONS  --------------------------------------------------------------------------------  Allergies    No Known Allergies      Standing Inpatient Medications  ferrous    sulfate 325 milliGRAM(s) Oral daily  furosemide   Injectable 80 milliGRAM(s) IV Push two times a day  heparin   Injectable 5000 Unit(s) SubCutaneous every 8 hours  hydrALAZINE 25 milliGRAM(s) Oral two times a day  NIFEdipine XL 60 milliGRAM(s) Oral daily  sodium bicarbonate 650 milliGRAM(s) Oral three times a day    PRN Inpatient Medications      REVIEW OF SYSTEMS  --------------------------------------------------------------------------------  Gen: No fever  Respiratory: No dyspnea  CV: No chest pain  GI: No abdominal pain, diarrhea, constipation, nausea, vomiting  : No increased frequency, dysuria, hematuria  Skin: No rashes  MSK: +LE edema  Neuro: No dizziness/lightheadedness    All other systems were reviewed and are negative, except as noted.    VITALS/PHYSICAL EXAM  --------------------------------------------------------------------------------  T(C): 36.6 (02-21-25 @ 09:18), Max: 37 (02-20-25 @ 13:07)  HR: 84 (02-21-25 @ 09:18) (64 - 85)  BP: 101/64 (02-21-25 @ 09:18) (101/64 - 177/95)  RR: 18 (02-21-25 @ 09:18) (18 - 21)  SpO2: 95% (02-21-25 @ 09:18) (95% - 99%)  Wt(kg): --  Height (cm): 149.9 (02-20-25 @ 08:09)  Weight (kg): 54 (02-20-25 @ 08:09)  BMI (kg/m2): 24 (02-20-25 @ 08:09)  BSA (m2): 1.48 (02-20-25 @ 08:09)      02-20-25 @ 07:01  -  02-21-25 @ 07:00  --------------------------------------------------------  IN: 240 mL / OUT: 1350 mL / NET: -1110 mL    02-21-25 @ 07:01  -  02-21-25 @ 11:06  --------------------------------------------------------  IN: 240 mL / OUT: 0 mL / NET: 240 mL      PHYSICAL EXAM:  Gen: NAD  Neuro: non-focal, slight asterixis  HEENT: +JVD  Pulm: CTA B/L  CV: +S1S2  Abd: soft, non-tender/non-distended  Extremities: ++pitting B/L LE edema  Skin: Warm  Access: RIJ non tunneled dialysis catheter in situ, site non tender, dry without any active bleed    LABS/STUDIES  --------------------------------------------------------------------------------              7.6    10.49 >-----------<  157      [02-21-25 @ 07:06]              21.4     121  |  76  |  99  ----------------------------<  102      [02-21-25 @ 07:06]  3.6   |  16  |  10.89        Ca     6.6     [02-21-25 @ 07:06]      Mg     2.2     [02-21-25 @ 07:06]      Phos  11.0     [02-21-25 @ 07:06]    TPro  7.9  /  Alb  4.2  /  TBili  0.5  /  DBili  x   /  AST  22  /  ALT  14  /  AlkPhos  109  [02-20-25 @ 08:47]    PT/INR: PT 11.6 , INR 1.02       [02-20-25 @ 08:47]  PTT: 34.9       [02-20-25 @ 08:47]    Serum Osmolality 289      [02-21-25 @ 00:23]    Creatinine Trend:  SCr 10.89 [02-21 @ 07:06]  SCr 10.77 [02-21 @ 00:23]  SCr 10.62 [02-20 @ 08:47]      Urine Creatinine 80      [02-20-25 @ 10:15]  Urine Protein 357      [02-20-25 @ 10:15]  Urine Sodium 15      [02-20-25 @ 10:15]  Urine Urea Nitrogen 365      [02-20-25 @ 10:15]  Urine Potassium 26      [02-20-25 @ 10:15]  Urine Osmolality 228      [02-20-25 @ 10:15]      HBsAg Nonreact      [02-20-25 @ 20:02]    
DATE OF SERVICE: 02-21-25 @ 12:06    Patient is a 59y old  Female who presents with a chief complaint of sob (21 Feb 2025 11:06)      SUBJECTIVE / OVERNIGHT EVENTS:  No chest pain. No shortness of breath. No complaints. No events overnight.     MEDICATIONS  (STANDING):  ferrous    sulfate 325 milliGRAM(s) Oral daily  furosemide   Injectable 80 milliGRAM(s) IV Push two times a day  heparin   Injectable 5000 Unit(s) SubCutaneous every 8 hours  NIFEdipine XL 60 milliGRAM(s) Oral daily  sodium bicarbonate 650 milliGRAM(s) Oral three times a day    MEDICATIONS  (PRN):      Vital Signs Last 24 Hrs  T(C): 36.6 (21 Feb 2025 09:18), Max: 37 (20 Feb 2025 13:07)  T(F): 97.8 (21 Feb 2025 09:18), Max: 98.6 (20 Feb 2025 13:07)  HR: 84 (21 Feb 2025 09:18) (64 - 85)  BP: 101/64 (21 Feb 2025 09:18) (101/64 - 177/95)  BP(mean): --  RR: 18 (21 Feb 2025 09:18) (18 - 21)  SpO2: 95% (21 Feb 2025 09:18) (95% - 99%)    Parameters below as of 21 Feb 2025 09:18  Patient On (Oxygen Delivery Method): room air      CAPILLARY BLOOD GLUCOSE        I&O's Summary    20 Feb 2025 07:01  -  21 Feb 2025 07:00  --------------------------------------------------------  IN: 240 mL / OUT: 1350 mL / NET: -1110 mL    21 Feb 2025 07:01  -  21 Feb 2025 12:06  --------------------------------------------------------  IN: 240 mL / OUT: 0 mL / NET: 240 mL        PHYSICAL EXAM:  GENERAL: NAD, well-developed  HEAD:  Atraumatic, Normocephalic  EYES: EOMI, PERRLA, conjunctiva and sclera clear  NECK: Supple, No JVD  CHEST/LUNG: Clear to auscultation bilaterally; No wheeze  HEART: Regular rate and rhythm; No murmurs, rubs, or gallops  ABDOMEN: Soft, Nontender, Nondistended; Bowel sounds present  EXTREMITIES:  2+ Peripheral Pulses, No clubbing, cyanosis, or edema  PSYCH: AAOx3  NEUROLOGY: non-focal  SKIN: No rashes or lesions    LABS:                        7.6    10.49 )-----------( 157      ( 21 Feb 2025 07:06 )             21.4     02-21    121[L]  |  76[L]  |  99[H]  ----------------------------<  102[H]  3.6   |  16[L]  |  10.89[H]    Ca    6.6[L]      21 Feb 2025 07:06  Phos  11.0     02-21  Mg     2.2     02-21    TPro  7.9  /  Alb  4.2  /  TBili  0.5  /  DBili  x   /  AST  22  /  ALT  14  /  AlkPhos  109  02-20    PT/INR - ( 20 Feb 2025 08:47 )   PT: 11.6 sec;   INR: 1.02 ratio         PTT - ( 20 Feb 2025 08:47 )  PTT:34.9 sec  CARDIAC MARKERS ( 20 Feb 2025 08:47 )  x     / x     / x     / x     / 3.1 ng/mL      Urinalysis Basic - ( 21 Feb 2025 07:06 )    Color: x / Appearance: x / SG: x / pH: x  Gluc: 102 mg/dL / Ketone: x  / Bili: x / Urobili: x   Blood: x / Protein: x / Nitrite: x   Leuk Esterase: x / RBC: x / WBC x   Sq Epi: x / Non Sq Epi: x / Bacteria: x        RADIOLOGY & ADDITIONAL TESTS:    Imaging Personally Reviewed:    Consultant(s) Notes Reviewed:      Care Discussed with Consultants/Other Providers:  
DATE OF SERVICE: 02-22-25 @ 12:45    Patient is a 59y old  Female who presents with a chief complaint of sob (22 Feb 2025 09:10)      SUBJECTIVE / OVERNIGHT EVENTS:  No chest pain. No shortness of breath. No complaints. No events overnight.     MEDICATIONS  (STANDING):  calcium acetate 1334 milliGRAM(s) Oral three times a day with meals  chlorhexidine 4% Liquid 1 Application(s) Topical <User Schedule>  ferrous    sulfate 325 milliGRAM(s) Oral daily  furosemide   Injectable 80 milliGRAM(s) IV Push two times a day  heparin   Injectable 5000 Unit(s) SubCutaneous every 8 hours  NIFEdipine XL 60 milliGRAM(s) Oral daily  sodium bicarbonate 650 milliGRAM(s) Oral three times a day    MEDICATIONS  (PRN):  sodium chloride 0.9% lock flush 10 milliLiter(s) IV Push every 1 hour PRN Pre/post blood products, medications, blood draw, and to maintain line patency      Vital Signs Last 24 Hrs  T(C): 36.8 (22 Feb 2025 11:05), Max: 36.9 (21 Feb 2025 15:57)  T(F): 98.3 (22 Feb 2025 11:05), Max: 98.4 (21 Feb 2025 15:57)  HR: 94 (22 Feb 2025 11:05) (62 - 94)  BP: 122/74 (22 Feb 2025 11:05) (104/60 - 139/81)  BP(mean): --  RR: 18 (22 Feb 2025 11:05) (17 - 20)  SpO2: 95% (22 Feb 2025 11:05) (94% - 98%)    Parameters below as of 22 Feb 2025 11:05  Patient On (Oxygen Delivery Method): room air      CAPILLARY BLOOD GLUCOSE        I&O's Summary    21 Feb 2025 07:01  -  22 Feb 2025 07:00  --------------------------------------------------------  IN: 480 mL / OUT: 1100 mL / NET: -620 mL    22 Feb 2025 07:01  -  22 Feb 2025 12:45  --------------------------------------------------------  IN: 0 mL / OUT: 1000 mL / NET: -1000 mL        PHYSICAL EXAM:  GENERAL: NAD, well-developed  HEAD:  Atraumatic, Normocephalic  EYES: EOMI, PERRLA, conjunctiva and sclera clear  NECK: Supple, No JVD  CHEST/LUNG: Clear to auscultation bilaterally; No wheeze  HEART: Regular rate and rhythm; No murmurs, rubs, or gallops  ABDOMEN: Soft, Nontender, Nondistended; Bowel sounds present  EXTREMITIES:  2+ Peripheral Pulses, No clubbing, cyanosis, luis edema  PSYCH: AAOx3  NEUROLOGY: non-focal  SKIN: No rashes or lesions    LABS:                        7.1    8.67  )-----------( 165      ( 22 Feb 2025 07:12 )             20.1     02-22    126[L]  |  85[L]  |  82[H]  ----------------------------<  107[H]  3.7   |  22  |  8.07[H]    Ca    7.2[L]      22 Feb 2025 07:11  Phos  11.0     02-21  Mg     2.2     02-21            Urinalysis Basic - ( 22 Feb 2025 07:11 )    Color: x / Appearance: x / SG: x / pH: x  Gluc: 107 mg/dL / Ketone: x  / Bili: x / Urobili: x   Blood: x / Protein: x / Nitrite: x   Leuk Esterase: x / RBC: x / WBC x   Sq Epi: x / Non Sq Epi: x / Bacteria: x        RADIOLOGY & ADDITIONAL TESTS:    Imaging Personally Reviewed:    Consultant(s) Notes Reviewed:      Care Discussed with Consultants/Other Providers:  
DATE OF SERVICE: 02-26-25 @ 19:54    Patient is a 59y old  Female who presents with a chief complaint of sob (26 Feb 2025 10:56)      SUBJECTIVE / OVERNIGHT EVENTS:  No chest pain. No shortness of breath. No complaints. No events overnight.     MEDICATIONS  (STANDING):  calcium acetate 1334 milliGRAM(s) Oral three times a day with meals  chlorhexidine 4% Liquid 1 Application(s) Topical <User Schedule>  ferrous    sulfate 325 milliGRAM(s) Oral daily  furosemide   Injectable 80 milliGRAM(s) IV Push two times a day  NIFEdipine XL 60 milliGRAM(s) Oral daily  ondansetron Injectable 4 milliGRAM(s) IV Push once  sodium bicarbonate 650 milliGRAM(s) Oral three times a day    MEDICATIONS  (PRN):  sodium chloride 0.9% lock flush 10 milliLiter(s) IV Push every 1 hour PRN Pre/post blood products, medications, blood draw, and to maintain line patency      Vital Signs Last 24 Hrs  T(C): 36.7 (26 Feb 2025 16:50), Max: 37.1 (26 Feb 2025 00:30)  T(F): 98 (26 Feb 2025 16:50), Max: 98.8 (26 Feb 2025 00:30)  HR: 65 (26 Feb 2025 16:50) (56 - 89)  BP: 134/90 (26 Feb 2025 16:50) (127/65 - 165/82)  BP(mean): 109 (26 Feb 2025 16:05) (87 - 109)  RR: 20 (26 Feb 2025 16:50) (8 - 20)  SpO2: 94% (26 Feb 2025 16:50) (94% - 98%)    Parameters below as of 26 Feb 2025 16:50  Patient On (Oxygen Delivery Method): room air      CAPILLARY BLOOD GLUCOSE        I&O's Summary    25 Feb 2025 07:01  -  26 Feb 2025 07:00  --------------------------------------------------------  IN: 960 mL / OUT: 0 mL / NET: 960 mL        PHYSICAL EXAM:  GENERAL: NAD, well-developed  HEAD:  Atraumatic, Normocephalic  EYES: EOMI, PERRLA, conjunctiva and sclera clear  NECK: Supple, No JVD  CHEST/LUNG: Clear to auscultation bilaterally; No wheeze  HEART: Regular rate and rhythm; No murmurs, rubs, or gallops  ABDOMEN: Soft, Nontender, Nondistended; Bowel sounds present  EXTREMITIES:  2+ Peripheral Pulses, No clubbing, cyanosis, or edema  PSYCH: AAOx3  NEUROLOGY: non-focal  SKIN: No rashes or lesions    LABS:                        6.8    5.65  )-----------( 138      ( 26 Feb 2025 07:12 )             20.9     02-26    137  |  97  |  72[H]  ----------------------------<  87  4.1   |  25  |  7.00[H]    Ca    8.8      26 Feb 2025 07:11  Phos  4.0     02-25  Mg     2.1     02-25    TPro  6.2  /  Alb  3.2[L]  /  TBili  0.2  /  DBili  x   /  AST  20  /  ALT  13  /  AlkPhos  89  02-26    PT/INR - ( 26 Feb 2025 07:07 )   PT: 11.6 sec;   INR: 1.01 ratio         PTT - ( 26 Feb 2025 07:07 )  PTT:31.3 sec      Urinalysis Basic - ( 26 Feb 2025 07:11 )    Color: x / Appearance: x / SG: x / pH: x  Gluc: 87 mg/dL / Ketone: x  / Bili: x / Urobili: x   Blood: x / Protein: x / Nitrite: x   Leuk Esterase: x / RBC: x / WBC x   Sq Epi: x / Non Sq Epi: x / Bacteria: x        RADIOLOGY & ADDITIONAL TESTS:    Imaging Personally Reviewed:    Consultant(s) Notes Reviewed:      Care Discussed with Consultants/Other Providers:  
DATE OF SERVICE: 03-01-25 @ 10:42    Patient is a 59y old  Female who presents with a chief complaint of sob (28 Feb 2025 13:04)      SUBJECTIVE / OVERNIGHT EVENTS:  No chest pain. No shortness of breath. No complaints. No events overnight.     MEDICATIONS  (STANDING):  calcium acetate 1334 milliGRAM(s) Oral three times a day with meals  chlorhexidine 4% Liquid 1 Application(s) Topical <User Schedule>  ferrous    sulfate 325 milliGRAM(s) Oral daily  furosemide   Injectable 80 milliGRAM(s) IV Push two times a day  NIFEdipine XL 60 milliGRAM(s) Oral daily  ondansetron Injectable 4 milliGRAM(s) IV Push once  sodium bicarbonate 650 milliGRAM(s) Oral three times a day    MEDICATIONS  (PRN):  sodium chloride 0.9% lock flush 10 milliLiter(s) IV Push every 1 hour PRN Pre/post blood products, medications, blood draw, and to maintain line patency      Vital Signs Last 24 Hrs  T(C): 36.8 (01 Mar 2025 09:00), Max: 37.1 (01 Mar 2025 05:52)  T(F): 98.2 (01 Mar 2025 09:00), Max: 98.8 (01 Mar 2025 05:52)  HR: 77 (01 Mar 2025 09:00) (60 - 83)  BP: 129/71 (01 Mar 2025 09:00) (129/71 - 167/90)  BP(mean): --  RR: 18 (01 Mar 2025 09:00) (17 - 20)  SpO2: 94% (01 Mar 2025 09:00) (94% - 98%)    Parameters below as of 01 Mar 2025 09:00  Patient On (Oxygen Delivery Method): room air      CAPILLARY BLOOD GLUCOSE        I&O's Summary    28 Feb 2025 07:01  -  01 Mar 2025 07:00  --------------------------------------------------------  IN: 540 mL / OUT: 1500 mL / NET: -960 mL    01 Mar 2025 07:01  -  01 Mar 2025 10:42  --------------------------------------------------------  IN: 480 mL / OUT: 0 mL / NET: 480 mL        PHYSICAL EXAM:  GENERAL: NAD, well-developed  HEAD:  Atraumatic, Normocephalic  EYES: EOMI, PERRLA, conjunctiva and sclera clear  NECK: Supple, No JVD  CHEST/LUNG: Clear to auscultation bilaterally; No wheeze  HEART: Regular rate and rhythm; No murmurs, rubs, or gallops  ABDOMEN: Soft, Nontender, Nondistended; Bowel sounds present  EXTREMITIES:  2+ Peripheral Pulses, No clubbing, cyanosis, or edema  PSYCH: AAOx3  NEUROLOGY: non-focal  SKIN: No rashes or lesions    LABS:                    RADIOLOGY & ADDITIONAL TESTS:    Imaging Personally Reviewed:    Consultant(s) Notes Reviewed:      Care Discussed with Consultants/Other Providers:  
DATE OF SERVICE: 03-03-25 @ 09:44    Patient is a 59y old  Female who presents with a chief complaint of sob (02 Mar 2025 09:28)      SUBJECTIVE / OVERNIGHT EVENTS:  No chest pain. No shortness of breath. No complaints. No events overnight.     MEDICATIONS  (STANDING):  calcium acetate 1334 milliGRAM(s) Oral three times a day with meals  chlorhexidine 4% Liquid 1 Application(s) Topical <User Schedule>  ferrous    sulfate 325 milliGRAM(s) Oral daily  NIFEdipine XL 60 milliGRAM(s) Oral daily  ondansetron Injectable 4 milliGRAM(s) IV Push once  sodium bicarbonate 650 milliGRAM(s) Oral three times a day  torsemide 40 milliGRAM(s) Oral <User Schedule>    MEDICATIONS  (PRN):  sodium chloride 0.9% lock flush 10 milliLiter(s) IV Push every 1 hour PRN Pre/post blood products, medications, blood draw, and to maintain line patency      Vital Signs Last 24 Hrs  T(C): 36.4 (03 Mar 2025 07:53), Max: 37.4 (02 Mar 2025 20:17)  T(F): 97.5 (03 Mar 2025 07:53), Max: 99.3 (02 Mar 2025 20:17)  HR: 89 (03 Mar 2025 07:53) (60 - 89)  BP: 139/81 (03 Mar 2025 07:53) (131/76 - 188/91)  BP(mean): --  RR: 18 (03 Mar 2025 07:53) (18 - 18)  SpO2: 96% (03 Mar 2025 07:53) (95% - 97%)    Parameters below as of 03 Mar 2025 07:53  Patient On (Oxygen Delivery Method): room air      CAPILLARY BLOOD GLUCOSE        I&O's Summary    02 Mar 2025 07:01  -  03 Mar 2025 07:00  --------------------------------------------------------  IN: 960 mL / OUT: 0 mL / NET: 960 mL        PHYSICAL EXAM:  GENERAL: NAD, well-developed  HEAD:  Atraumatic, Normocephalic  EYES: EOMI, PERRLA, conjunctiva and sclera clear  NECK: Supple, No JVD  CHEST/LUNG: Clear to auscultation bilaterally; No wheeze  HEART: Regular rate and rhythm; No murmurs, rubs, or gallops  ABDOMEN: Soft, Nontender, Nondistended; Bowel sounds present  EXTREMITIES:  2+ Peripheral Pulses, No clubbing, cyanosis, or edema  PSYCH: AAOx3  NEUROLOGY: non-focal  SKIN: No rashes or lesions    LABS:                        7.7    6.18  )-----------( 131      ( 03 Mar 2025 07:28 )             23.3     03-03    138  |  96  |  94[H]  ----------------------------<  93  4.0   |  24  |  8.30[H]    Ca    8.6      03 Mar 2025 07:26            Urinalysis Basic - ( 03 Mar 2025 07:26 )    Color: x / Appearance: x / SG: x / pH: x  Gluc: 93 mg/dL / Ketone: x  / Bili: x / Urobili: x   Blood: x / Protein: x / Nitrite: x   Leuk Esterase: x / RBC: x / WBC x   Sq Epi: x / Non Sq Epi: x / Bacteria: x        RADIOLOGY & ADDITIONAL TESTS:    Imaging Personally Reviewed:    Consultant(s) Notes Reviewed:      Care Discussed with Consultants/Other Providers:  
DATE OF SERVICE: 03-04-25 @ 12:09    Patient is a 59y old  Female who presents with a chief complaint of sob (04 Mar 2025 11:31)      SUBJECTIVE / OVERNIGHT EVENTS:    MEDICATIONS  (STANDING):  calcium acetate 1334 milliGRAM(s) Oral three times a day with meals  chlorhexidine 4% Liquid 1 Application(s) Topical <User Schedule>  epoetin neo (EPOGEN) Injectable 4000 Unit(s) IV Push <User Schedule>  ferrous    sulfate 325 milliGRAM(s) Oral daily  NIFEdipine XL 60 milliGRAM(s) Oral daily  ondansetron Injectable 4 milliGRAM(s) IV Push once  sodium bicarbonate 650 milliGRAM(s) Oral three times a day  torsemide 40 milliGRAM(s) Oral <User Schedule>    MEDICATIONS  (PRN):  sodium chloride 0.9% lock flush 10 milliLiter(s) IV Push every 1 hour PRN Pre/post blood products, medications, blood draw, and to maintain line patency      Vital Signs Last 24 Hrs  T(C): 36.8 (04 Mar 2025 11:10), Max: 36.8 (04 Mar 2025 11:10)  T(F): 98.3 (04 Mar 2025 11:10), Max: 98.3 (04 Mar 2025 11:10)  HR: 77 (04 Mar 2025 11:10) (61 - 77)  BP: 131/79 (04 Mar 2025 11:10) (131/79 - 162/78)  BP(mean): --  RR: 18 (04 Mar 2025 11:10) (18 - 20)  SpO2: 96% (04 Mar 2025 11:10) (95% - 96%)    Parameters below as of 04 Mar 2025 04:50  Patient On (Oxygen Delivery Method): room air      CAPILLARY BLOOD GLUCOSE        I&O's Summary    03 Mar 2025 07:01  -  04 Mar 2025 07:00  --------------------------------------------------------  IN: 540 mL / OUT: 1500 mL / NET: -960 mL        PHYSICAL EXAM:  GENERAL: NAD, well-developed  HEAD:  Atraumatic, Normocephalic  EYES: EOMI, PERRLA, conjunctiva and sclera clear  NECK: Supple, No JVD  CHEST/LUNG: Clear to auscultation bilaterally; No wheeze  HEART: Regular rate and rhythm; No murmurs, rubs, or gallops  ABDOMEN: Soft, Nontender, Nondistended; Bowel sounds present  EXTREMITIES:  2+ Peripheral Pulses, No clubbing, cyanosis, or edema  PSYCH: AAOx3  NEUROLOGY: non-focal  SKIN: No rashes or lesions    LABS:                        7.7    6.18  )-----------( 131      ( 03 Mar 2025 07:28 )             23.3     03-03    138  |  96  |  94[H]  ----------------------------<  93  4.0   |  24  |  8.30[H]    Ca    8.6      03 Mar 2025 07:26      Quantiferon Plus TB (02.27.25 @ 11:42)   Quantiferon TB Plus: NEGATIVE: NEGATIVE: M. tuberculosis infection is NOT likely. No interferon-gamma   response to M. tuberculosis antigens was detected   POSITIVE: M. tuberculosis infection is likely. Interferon-gamma response   to M. tuberculosis antigens was detected. False-positive results may   occur in patients with prior infection with M. marinum, M. szulgai, or M.   kansasii.   INDETERMINATE: Likelihood of M. tuberculosis infection cannot be   determined. Repeat testing on a new specimen is suggested.   The QuantiFERON-TB Gold Plus assay measures T-cell release of   interferon-gamma following stimulation by M. tuberculosis complex   antigens (ESAT-6 and CFP-10). The magnitude of the amount of measured   interferon-gamma cannot be correlated to stage or degree of infection,   level of immune responsiveness, or likelihood for progression to active   disease. Results should be used in conjunction with risk assessment,   radiography, and other medical and diagnostic evaluations.  Quantiferon TB Plus Nil: 0.04 IU/mL  Quantiferon TB Plus TB1 minus Nil: 0.00 IU/mL  Quantiferon TB Plus TB2 minus Nil: 0.01 IU/mL  Quant TB Plus Mitogen minus Nil: 1.49 IU/mL      Urinalysis Basic - ( 03 Mar 2025 07:26 )    Color: x / Appearance: x / SG: x / pH: x  Gluc: 93 mg/dL / Ketone: x  / Bili: x / Urobili: x   Blood: x / Protein: x / Nitrite: x   Leuk Esterase: x / RBC: x / WBC x   Sq Epi: x / Non Sq Epi: x / Bacteria: x        RADIOLOGY & ADDITIONAL TESTS:    Imaging Personally Reviewed:    Consultant(s) Notes Reviewed:      Care Discussed with Consultants/Other Providers:  
[facility] DIVISION OF KIDNEY DISEASES AND HYPERTENSION -- FOLLOW UP NOTE  --------------------------------------------------------------------------------  Chief Complaint:    24 hour events/subjective:      PAST HISTORY  --------------------------------------------------------------------------------  No significant changes to PMH, PSH, FHx, SHx, unless otherwise noted    ALLERGIES & MEDICATIONS  --------------------------------------------------------------------------------  Allergies    No Known Allergies    Intolerances      Standing Inpatient Medications  calcium acetate 1334 milliGRAM(s) Oral three times a day with meals  chlorhexidine 4% Liquid 1 Application(s) Topical <User Schedule>  ferrous    sulfate 325 milliGRAM(s) Oral daily  furosemide   Injectable 80 milliGRAM(s) IV Push two times a day  NIFEdipine XL 60 milliGRAM(s) Oral daily  ondansetron Injectable 4 milliGRAM(s) IV Push once  sodium bicarbonate 650 milliGRAM(s) Oral three times a day    PRN Inpatient Medications  sodium chloride 0.9% lock flush 10 milliLiter(s) IV Push every 1 hour PRN      REVIEW OF SYSTEMS  --------------------------------------------------------------------------------  Gen: No weight changes, fatigue, fevers/chills, weakness  Skin: No rashes  Head/Eyes/Ears/Mouth: No headache; Normal hearing; Normal vision w/o blurriness; No sinus pain/discomfort, sore throat  Respiratory: No dyspnea, cough, wheezing, hemoptysis  CV: No chest pain, PND, orthopnea  GI: No abdominal pain, diarrhea, constipation, nausea, vomiting, melena, hematochezia  : No increased frequency, dysuria, hematuria, nocturia  MSK: No joint pain/swelling; no back pain; no edema  Neuro: No dizziness/lightheadedness, weakness, seizures, numbness, tingling  Heme: No easy bruising or bleeding  Endo: No heat/cold intolerance  Psych: No significant nervousness, anxiety, stress, depression    All other systems were reviewed and are negative, except as noted.    VITALS/PHYSICAL EXAM  --------------------------------------------------------------------------------  T(C): 36.6 (02-27-25 @ 04:55), Max: 37.1 (02-27-25 @ 00:18)  HR: 74 (02-27-25 @ 04:55) (56 - 88)  BP: 166/93 (02-27-25 @ 04:55) (127/65 - 177/92)  RR: 18 (02-27-25 @ 04:55) (8 - 20)  SpO2: 95% (02-27-25 @ 04:55) (94% - 98%)  Wt(kg): --  Height (cm): 149.9 (02-26-25 @ 14:13)  Weight (kg): 61.2 (02-26-25 @ 14:13)  BMI (kg/m2): 27.2 (02-26-25 @ 14:13)  BSA (m2): 1.56 (02-26-25 @ 14:13)      02-26-25 @ 07:01  -  02-27-25 @ 07:00  --------------------------------------------------------  IN: 1160 mL / OUT: 2300 mL / NET: -1140 mL    02-27-25 @ 07:01  -  02-27-25 @ 11:17  --------------------------------------------------------  IN: 240 mL / OUT: 0 mL / NET: 240 mL      Physical Exam:  	Gen: NAD, well-appearing  	HEENT: PERRL, supple neck, clear oropharynx  	Pulm: CTA B/L  	CV: RRR, S1S2; no rub  	Back: No spinal or CVA tenderness; no sacral edema  	Abd: +BS, soft, nontender/nondistended  	: No suprapubic tenderness  	UE: Warm, FROM, no clubbing, intact strength; no edema; no asterixis  	LE: Warm, FROM, no clubbing, intact strength; no edema  	Neuro: No focal deficits, intact gait  	Psych: Normal affect and mood  	Skin: Warm, without rashes  	Vascular access:    LABS/STUDIES  --------------------------------------------------------------------------------              8.9    6.86  >-----------<  142      [02-26-25 @ 20:38]              26.2     137  |  97  |  72  ----------------------------<  87      [02-26-25 @ 07:11]  4.1   |  25  |  7.00        Ca     8.8     [02-26-25 @ 07:11]    TPro  6.2  /  Alb  3.2  /  TBili  0.2  /  DBili  x   /  AST  20  /  ALT  13  /  AlkPhos  89  [02-26-25 @ 07:11]    PT/INR: PT 11.6 , INR 1.01       [02-26-25 @ 07:07]  PTT: 31.3       [02-26-25 @ 07:07]      Creatinine Trend:  SCr 7.00 [02-26 @ 07:11]  SCr 5.03 [02-25 @ 07:15]  SCr 3.61 [02-24 @ 16:02]  SCr 7.56 [02-24 @ 07:01]  SCr 6.11 [02-23 @ 07:20]    Urinalysis - [02-26-25 @ 07:11]      Color  / Appearance  / SG  / pH       Gluc 87 / Ketone   / Bili  / Urobili        Blood  / Protein  / Leuk Est  / Nitrite       RBC  / WBC  / Hyaline  / Gran  / Sq Epi  / Non Sq Epi  / Bacteria       Iron 22, TIBC 209, %sat 11      [02-21-25 @ 07:06]  Ferritin 935      [02-21-25 @ 07:07]  PTH -- (Ca 6.8)      [02-22-25 @ 07:11]   377  TSH 1.93      [02-21-25 @ 07:07]    HBsAb Reactive      [02-25-25 @ 18:52]  HBsAg Nonreact      [02-24-25 @ 12:59]  HCV 0.21, Nonreact      [02-24-25 @ 12:59]    
[facility] Division of Kidney Diseases & Hypertension  FOLLOW UP NOTE  [phone]--------------------------------------------------------------------------------  Chief Complaint: Newly deemed ESRD now on HD     24 hour events/subjective: Pt seen and evaluated this morning while undergoing HD. Tolerating HD treatment and TDC functioning well. Reports feeling well, endorses no complaints. Denies any headaches, nausea, vomiting, fevers/chills, chest pain, palpitations, SOB, abdominal pain, and leg swelling.    PAST HISTORY  --------------------------------------------------------------------------------  No significant changes to PMH, PSH, FHx, SHx, unless otherwise noted    ALLERGIES & MEDICATIONS  --------------------------------------------------------------------------------  Allergies    No Known Allergies    Intolerances    Standing Inpatient Medications  calcium acetate 1334 milliGRAM(s) Oral three times a day with meals  chlorhexidine 4% Liquid 1 Application(s) Topical <User Schedule>  ferrous    sulfate 325 milliGRAM(s) Oral daily  NIFEdipine XL 60 milliGRAM(s) Oral daily  ondansetron Injectable 4 milliGRAM(s) IV Push once  sodium bicarbonate 650 milliGRAM(s) Oral three times a day  torsemide 40 milliGRAM(s) Oral <User Schedule>    PRN Inpatient Medications  sodium chloride 0.9% lock flush 10 milliLiter(s) IV Push every 1 hour PRN    REVIEW OF SYSTEMS  --------------------------------------------------------------------------------  Gen: No fevers/chills  Skin: No rashes  Head/Eyes/Ears/Mouth: No headache  Respiratory: No dyspnea  CV: No chest pain  GI: No abdominal pain  : No increased frequency  MSK: No joint pain/swelling; no edema  Neuro: No dizziness/lightheadedness    All other systems were reviewed and are negative, except as noted.    VITALS/PHYSICAL EXAM  --------------------------------------------------------------------------------  T(C): 36.8 (03-03-25 @ 11:48), Max: 37.4 (03-02-25 @ 20:17)  HR: 75 (03-03-25 @ 11:48) (60 - 89)  BP: 139/87 (03-03-25 @ 11:48) (139/81 - 188/91)  RR: 18 (03-03-25 @ 11:48) (18 - 18)  SpO2: 96% (03-03-25 @ 11:48) (95% - 97%)  Wt(kg): --    03-02-25 @ 07:01  -  03-03-25 @ 07:00  --------------------------------------------------------  IN: 960 mL / OUT: 0 mL / NET: 960 mL    03-03-25 @ 07:01  -  03-03-25 @ 12:03  --------------------------------------------------------  IN: 0 mL / OUT: 1500 mL / NET: -1500 mL    Physical Exam:  	Gen: NAD, well-appearing  	HEENT: PERRL, MMM  	Pulm: CTA B/L  	CV: RRR, S1S2  	Abd: +BS, soft, nontender/nondistended  	: No suprapubic tenderness              Extremities: no bilateral LE edema noted.               Neuro: Awake   	Skin: Warm and dry   	Vascular access: [facility]     LABS/STUDIES  --------------------------------------------------------------------------------              7.7    6.18  >-----------<  131      [03-03-25 @ 07:28]              23.3     138  |  96  |  94  ----------------------------<  93      [03-03-25 @ 07:26]  4.0   |  24  |  8.30        Ca     8.6     [03-03-25 @ 07:26]    Creatinine Trend:  SCr 8.30 [03-03 @ 07:26]  SCr 7.00 [02-26 @ 07:11]  SCr 5.03 [02-25 @ 07:15]  SCr 3.61 [02-24 @ 16:02]  SCr 7.56 [02-24 @ 07:01]    HBsAb Reactive      [02-25-25 @ 18:52]  HBsAg Nonreact      [02-24-25 @ 12:59]  HCV 0.21, Nonreact      [02-24-25 @ 12:59]
DATE OF SERVICE: 02-25-25 @ 16:01    Patient is a 59y old  Female who presents with a chief complaint of sob (25 Feb 2025 12:58)      SUBJECTIVE / OVERNIGHT EVENTS:  No chest pain. No shortness of breath. No complaints. No events overnight. refused PD catheter placement but she is agreeable now    MEDICATIONS  (STANDING):  calcium acetate 1334 milliGRAM(s) Oral three times a day with meals  chlorhexidine 4% Liquid 1 Application(s) Topical <User Schedule>  ferrous    sulfate 325 milliGRAM(s) Oral daily  furosemide   Injectable 80 milliGRAM(s) IV Push two times a day  NIFEdipine XL 60 milliGRAM(s) Oral daily  ondansetron Injectable 4 milliGRAM(s) IV Push once  sodium bicarbonate 650 milliGRAM(s) Oral three times a day    MEDICATIONS  (PRN):  sodium chloride 0.9% lock flush 10 milliLiter(s) IV Push every 1 hour PRN Pre/post blood products, medications, blood draw, and to maintain line patency      Vital Signs Last 24 Hrs  T(C): 36.9 (25 Feb 2025 13:00), Max: 37.4 (25 Feb 2025 05:06)  T(F): 98.4 (25 Feb 2025 13:00), Max: 99.3 (25 Feb 2025 05:06)  HR: 79 (25 Feb 2025 13:00) (60 - 79)  BP: 168/76 (25 Feb 2025 13:00) (126/80 - 175/90)  BP(mean): --  RR: 18 (25 Feb 2025 13:00) (18 - 18)  SpO2: 96% (25 Feb 2025 13:00) (94% - 98%)    Parameters below as of 25 Feb 2025 13:00  Patient On (Oxygen Delivery Method): room air      CAPILLARY BLOOD GLUCOSE        I&O's Summary    24 Feb 2025 07:01  -  25 Feb 2025 07:00  --------------------------------------------------------  IN: 0 mL / OUT: 1600 mL / NET: -1600 mL    25 Feb 2025 07:01  -  25 Feb 2025 16:01  --------------------------------------------------------  IN: 600 mL / OUT: 0 mL / NET: 600 mL        PHYSICAL EXAM:  GENERAL: NAD, well-developed  HEAD:  Atraumatic, Normocephalic  EYES: EOMI, PERRLA, conjunctiva and sclera clear  NECK: Supple, No JVD  CHEST/LUNG: Clear to auscultation bilaterally; No wheeze  HEART: Regular rate and rhythm; No murmurs, rubs, or gallops  ABDOMEN: Soft, Nontender, Nondistended; Bowel sounds present  EXTREMITIES:  2+ Peripheral Pulses, No clubbing, cyanosis, or edema  PSYCH: AAOx3  NEUROLOGY: non-focal  SKIN: No rashes or lesions    LABS:                        7.5    6.30  )-----------( 168      ( 25 Feb 2025 07:19 )             22.6     02-25    138  |  96  |  42[H]  ----------------------------<  88  4.1   |  29  |  5.03[H]    Ca    8.6      25 Feb 2025 07:15  Phos  4.0     02-25  Mg     2.1     02-25      PT/INR - ( 24 Feb 2025 07:05 )   PT: 11.3 sec;   INR: 0.99 ratio         PTT - ( 24 Feb 2025 07:05 )  PTT:31.8 sec      Urinalysis Basic - ( 25 Feb 2025 07:15 )    Color: x / Appearance: x / SG: x / pH: x  Gluc: 88 mg/dL / Ketone: x  / Bili: x / Urobili: x   Blood: x / Protein: x / Nitrite: x   Leuk Esterase: x / RBC: x / WBC x   Sq Epi: x / Non Sq Epi: x / Bacteria: x        RADIOLOGY & ADDITIONAL TESTS:    Imaging Personally Reviewed:    Consultant(s) Notes Reviewed:      Care Discussed with Consultants/Other Providers:  
DATE OF SERVICE: 02-27-25 @ 11:22    Patient is a 59y old  Female who presents with a chief complaint of sob (27 Feb 2025 11:16)      SUBJECTIVE / OVERNIGHT EVENTS:  No chest pain. No shortness of breath. No complaints. No events overnight.     MEDICATIONS  (STANDING):  calcium acetate 1334 milliGRAM(s) Oral three times a day with meals  chlorhexidine 4% Liquid 1 Application(s) Topical <User Schedule>  ferrous    sulfate 325 milliGRAM(s) Oral daily  furosemide   Injectable 80 milliGRAM(s) IV Push two times a day  NIFEdipine XL 60 milliGRAM(s) Oral daily  ondansetron Injectable 4 milliGRAM(s) IV Push once  sodium bicarbonate 650 milliGRAM(s) Oral three times a day    MEDICATIONS  (PRN):  sodium chloride 0.9% lock flush 10 milliLiter(s) IV Push every 1 hour PRN Pre/post blood products, medications, blood draw, and to maintain line patency      Vital Signs Last 24 Hrs  T(C): 36.6 (27 Feb 2025 04:55), Max: 37.1 (27 Feb 2025 00:18)  T(F): 97.9 (27 Feb 2025 04:55), Max: 98.8 (27 Feb 2025 00:18)  HR: 74 (27 Feb 2025 04:55) (56 - 88)  BP: 166/93 (27 Feb 2025 04:55) (127/65 - 177/92)  BP(mean): 109 (26 Feb 2025 16:05) (87 - 109)  RR: 18 (27 Feb 2025 04:55) (8 - 20)  SpO2: 95% (27 Feb 2025 04:55) (94% - 98%)    Parameters below as of 27 Feb 2025 04:55  Patient On (Oxygen Delivery Method): room air      CAPILLARY BLOOD GLUCOSE        I&O's Summary    26 Feb 2025 07:01  -  27 Feb 2025 07:00  --------------------------------------------------------  IN: 1160 mL / OUT: 2300 mL / NET: -1140 mL    27 Feb 2025 07:01  -  27 Feb 2025 11:22  --------------------------------------------------------  IN: 240 mL / OUT: 0 mL / NET: 240 mL        PHYSICAL EXAM:  GENERAL: NAD, well-developed  HEAD:  Atraumatic, Normocephalic  EYES: EOMI, PERRLA, conjunctiva and sclera clear  NECK: Supple, No JVD  CHEST/LUNG: Clear to auscultation bilaterally; No wheeze  HEART: Regular rate and rhythm; No murmurs, rubs, or gallops  ABDOMEN: Soft, Nontender, Nondistended; Bowel sounds present  EXTREMITIES:  2+ Peripheral Pulses, No clubbing, cyanosis, or edema  PSYCH: AAOx3  NEUROLOGY: non-focal  SKIN: No rashes or lesions    LABS:                        8.9    6.86  )-----------( 142      ( 26 Feb 2025 20:38 )             26.2     02-26    137  |  97  |  72[H]  ----------------------------<  87  4.1   |  25  |  7.00[H]    Ca    8.8      26 Feb 2025 07:11    TPro  6.2  /  Alb  3.2[L]  /  TBili  0.2  /  DBili  x   /  AST  20  /  ALT  13  /  AlkPhos  89  02-26    PT/INR - ( 26 Feb 2025 07:07 )   PT: 11.6 sec;   INR: 1.01 ratio         PTT - ( 26 Feb 2025 07:07 )  PTT:31.3 sec      Urinalysis Basic - ( 26 Feb 2025 07:11 )    Color: x / Appearance: x / SG: x / pH: x  Gluc: 87 mg/dL / Ketone: x  / Bili: x / Urobili: x   Blood: x / Protein: x / Nitrite: x   Leuk Esterase: x / RBC: x / WBC x   Sq Epi: x / Non Sq Epi: x / Bacteria: x        RADIOLOGY & ADDITIONAL TESTS:    Imaging Personally Reviewed:    Consultant(s) Notes Reviewed:      Care Discussed with Consultants/Other Providers:  
DATE OF SERVICE: 03-02-25 @ 09:28    Patient is a 59y old  Female who presents with a chief complaint of sob (01 Mar 2025 10:42)      SUBJECTIVE / OVERNIGHT EVENTS:  No chest pain. No shortness of breath. No complaints. No events overnight.     MEDICATIONS  (STANDING):  calcium acetate 1334 milliGRAM(s) Oral three times a day with meals  chlorhexidine 4% Liquid 1 Application(s) Topical <User Schedule>  ferrous    sulfate 325 milliGRAM(s) Oral daily  NIFEdipine XL 60 milliGRAM(s) Oral daily  ondansetron Injectable 4 milliGRAM(s) IV Push once  sodium bicarbonate 650 milliGRAM(s) Oral three times a day  torsemide 40 milliGRAM(s) Oral <User Schedule>    MEDICATIONS  (PRN):  sodium chloride 0.9% lock flush 10 milliLiter(s) IV Push every 1 hour PRN Pre/post blood products, medications, blood draw, and to maintain line patency      Vital Signs Last 24 Hrs  T(C): 36.8 (02 Mar 2025 05:16), Max: 36.9 (01 Mar 2025 17:21)  T(F): 98.2 (02 Mar 2025 05:16), Max: 98.4 (01 Mar 2025 17:21)  HR: 60 (02 Mar 2025 05:16) (60 - 79)  BP: 171/90 (02 Mar 2025 05:16) (130/80 - 171/90)  BP(mean): --  RR: 19 (02 Mar 2025 05:16) (18 - 19)  SpO2: 96% (02 Mar 2025 05:16) (96% - 98%)    Parameters below as of 02 Mar 2025 05:16  Patient On (Oxygen Delivery Method): room air      CAPILLARY BLOOD GLUCOSE        I&O's Summary    01 Mar 2025 07:01  -  02 Mar 2025 07:00  --------------------------------------------------------  IN: 1680 mL / OUT: 0 mL / NET: 1680 mL        PHYSICAL EXAM:  GENERAL: NAD, well-developed  HEAD:  Atraumatic, Normocephalic  EYES: EOMI, PERRLA, conjunctiva and sclera clear  NECK: Supple, No JVD  CHEST/LUNG: Clear to auscultation bilaterally; No wheeze  HEART: Regular rate and rhythm; No murmurs, rubs, or gallops  ABDOMEN: Soft, Nontender, Nondistended; Bowel sounds present  EXTREMITIES:  2+ Peripheral Pulses, No clubbing, cyanosis, or edema  PSYCH: AAOx3  NEUROLOGY: non-focal  SKIN: No rashes or lesions    LABS:                    RADIOLOGY & ADDITIONAL TESTS:    Imaging Personally Reviewed:    Consultant(s) Notes Reviewed:      Care Discussed with Consultants/Other Providers:  
[facility] DIVISION OF KIDNEY DISEASES AND HYPERTENSION --    Reason for consult: Advanced CKD    24 hour events/subjective: Patient seen and examined at bedside. States LE edema has improved. Denies SOB, has mild chest wall tenderness. No n/v/d.      PAST HISTORY  --------------------------------------------------------------------------------  No significant changes to PMH, PSH, FHx, SHx, unless otherwise noted    ALLERGIES & MEDICATIONS  --------------------------------------------------------------------------------  Allergies    No Known Allergies      Standing Inpatient Medications  ferrous    sulfate 325 milliGRAM(s) Oral daily  furosemide   Injectable 80 milliGRAM(s) IV Push two times a day  heparin   Injectable 5000 Unit(s) SubCutaneous every 8 hours  hydrALAZINE 25 milliGRAM(s) Oral two times a day  NIFEdipine XL 60 milliGRAM(s) Oral daily  sodium bicarbonate 650 milliGRAM(s) Oral three times a day    PRN Inpatient Medications      REVIEW OF SYSTEMS  --------------------------------------------------------------------------------  Gen: No fever  Respiratory: No dyspnea  CV: No chest pain  GI: No abdominal pain, diarrhea, constipation, nausea, vomiting  : No increased frequency, dysuria, hematuria  Skin: No rashes  MSK: +LE edema  Neuro: No dizziness/lightheadedness    All other systems were reviewed and are negative, except as noted.    VITALS/PHYSICAL EXAM  --------------------------------------------------------------------------------  T(C): 36.6 (02-21-25 @ 09:18), Max: 37 (02-20-25 @ 13:07)  HR: 84 (02-21-25 @ 09:18) (64 - 85)  BP: 101/64 (02-21-25 @ 09:18) (101/64 - 177/95)  RR: 18 (02-21-25 @ 09:18) (18 - 21)  SpO2: 95% (02-21-25 @ 09:18) (95% - 99%)  Wt(kg): --  Height (cm): 149.9 (02-20-25 @ 08:09)  Weight (kg): 54 (02-20-25 @ 08:09)  BMI (kg/m2): 24 (02-20-25 @ 08:09)  BSA (m2): 1.48 (02-20-25 @ 08:09)      02-20-25 @ 07:01  -  02-21-25 @ 07:00  --------------------------------------------------------  IN: 240 mL / OUT: 1350 mL / NET: -1110 mL    02-21-25 @ 07:01  -  02-21-25 @ 11:06  --------------------------------------------------------  IN: 240 mL / OUT: 0 mL / NET: 240 mL      PHYSICAL EXAM:  Gen: NAD  Neuro: non-focal, slight asterixis  HEENT: +JVD  Pulm: CTA B/L  CV: +S1S2  Abd: soft, non-tender/non-distended  Extremities: ++pitting B/L LE edema  Skin: Warm    LABS/STUDIES  --------------------------------------------------------------------------------              7.6    10.49 >-----------<  157      [02-21-25 @ 07:06]              21.4     121  |  76  |  99  ----------------------------<  102      [02-21-25 @ 07:06]  3.6   |  16  |  10.89        Ca     6.6     [02-21-25 @ 07:06]      Mg     2.2     [02-21-25 @ 07:06]      Phos  11.0     [02-21-25 @ 07:06]    TPro  7.9  /  Alb  4.2  /  TBili  0.5  /  DBili  x   /  AST  22  /  ALT  14  /  AlkPhos  109  [02-20-25 @ 08:47]    PT/INR: PT 11.6 , INR 1.02       [02-20-25 @ 08:47]  PTT: 34.9       [02-20-25 @ 08:47]    Serum Osmolality 289      [02-21-25 @ 00:23]    Creatinine Trend:  SCr 10.89 [02-21 @ 07:06]  SCr 10.77 [02-21 @ 00:23]  SCr 10.62 [02-20 @ 08:47]      Urine Creatinine 80      [02-20-25 @ 10:15]  Urine Protein 357      [02-20-25 @ 10:15]  Urine Sodium 15      [02-20-25 @ 10:15]  Urine Urea Nitrogen 365      [02-20-25 @ 10:15]  Urine Potassium 26      [02-20-25 @ 10:15]  Urine Osmolality 228      [02-20-25 @ 10:15]      HBsAg Nonreact      [02-20-25 @ 20:02]    
DATE OF SERVICE: 02-23-25 @ 10:09    Patient is a 59y old  Female who presents with a chief complaint of sob (22 Feb 2025 12:45)      SUBJECTIVE / OVERNIGHT EVENTS:  No chest pain. No shortness of breath. No complaints. No events overnight.     MEDICATIONS  (STANDING):  calcium acetate 1334 milliGRAM(s) Oral three times a day with meals  chlorhexidine 4% Liquid 1 Application(s) Topical <User Schedule>  ferrous    sulfate 325 milliGRAM(s) Oral daily  furosemide   Injectable 80 milliGRAM(s) IV Push two times a day  heparin   Injectable 5000 Unit(s) SubCutaneous every 8 hours  NIFEdipine XL 60 milliGRAM(s) Oral daily  sodium bicarbonate 650 milliGRAM(s) Oral three times a day    MEDICATIONS  (PRN):  sodium chloride 0.9% lock flush 10 milliLiter(s) IV Push every 1 hour PRN Pre/post blood products, medications, blood draw, and to maintain line patency      Vital Signs Last 24 Hrs  T(C): 37 (23 Feb 2025 04:21), Max: 37 (23 Feb 2025 04:21)  T(F): 98.6 (23 Feb 2025 04:21), Max: 98.6 (23 Feb 2025 04:21)  HR: 75 (23 Feb 2025 04:21) (72 - 94)  BP: 167/88 (23 Feb 2025 04:21) (119/75 - 167/88)  BP(mean): --  RR: 18 (23 Feb 2025 04:21) (18 - 18)  SpO2: 96% (23 Feb 2025 04:21) (95% - 96%)    Parameters below as of 23 Feb 2025 04:21  Patient On (Oxygen Delivery Method): room air      CAPILLARY BLOOD GLUCOSE        I&O's Summary    22 Feb 2025 07:01  -  23 Feb 2025 07:00  --------------------------------------------------------  IN: 720 mL / OUT: 1000 mL / NET: -280 mL        PHYSICAL EXAM:  GENERAL: NAD, well-developed  HEAD:  Atraumatic, Normocephalic  EYES: EOMI, PERRLA, conjunctiva and sclera clear  NECK: Supple, No JVD  CHEST/LUNG: Clear to auscultation bilaterally; No wheeze  HEART: Regular rate and rhythm; No murmurs, rubs, or gallops  ABDOMEN: Soft, Nontender, Nondistended; Bowel sounds present  EXTREMITIES:  2+ Peripheral Pulses, No clubbing, cyanosis, luis edema  PSYCH: AAOx3  NEUROLOGY: non-focal  SKIN: No rashes or lesions    LABS:                        6.6    7.01  )-----------( 165      ( 23 Feb 2025 07:22 )             19.7     02-23    135  |  95[L]  |  54[H]  ----------------------------<  91  3.8   |  22  |  6.11[H]    Ca    8.0[L]      23 Feb 2025 07:20  Phos  4.1     02-23  Mg     2.2     02-23            Urinalysis Basic - ( 23 Feb 2025 07:20 )    Color: x / Appearance: x / SG: x / pH: x  Gluc: 91 mg/dL / Ketone: x  / Bili: x / Urobili: x   Blood: x / Protein: x / Nitrite: x   Leuk Esterase: x / RBC: x / WBC x   Sq Epi: x / Non Sq Epi: x / Bacteria: x        RADIOLOGY & ADDITIONAL TESTS:    Imaging Personally Reviewed:    Consultant(s) Notes Reviewed:      Care Discussed with Consultants/Other Providers:  
[facility] DIVISION OF KIDNEY DISEASES AND HYPERTENSION -- FOLLOW UP NOTE  --------------------------------------------------------------------------------  Chief Complaint:    24 hour events/subjective:        PAST HISTORY  --------------------------------------------------------------------------------  No significant changes to PMH, PSH, FHx, SHx, unless otherwise noted    ALLERGIES & MEDICATIONS  --------------------------------------------------------------------------------  Allergies    No Known Allergies    Intolerances      Standing Inpatient Medications  calcium acetate 1334 milliGRAM(s) Oral three times a day with meals  chlorhexidine 4% Liquid 1 Application(s) Topical <User Schedule>  ferrous    sulfate 325 milliGRAM(s) Oral daily  furosemide   Injectable 80 milliGRAM(s) IV Push two times a day  NIFEdipine XL 60 milliGRAM(s) Oral daily  ondansetron Injectable 4 milliGRAM(s) IV Push once  sodium bicarbonate 650 milliGRAM(s) Oral three times a day    PRN Inpatient Medications  sodium chloride 0.9% lock flush 10 milliLiter(s) IV Push every 1 hour PRN      REVIEW OF SYSTEMS  --------------------------------------------------------------------------------  Gen: No weight changes, fatigue, fevers/chills, weakness  Skin: No rashes  Head/Eyes/Ears/Mouth: No headache; Normal hearing; Normal vision w/o blurriness; No sinus pain/discomfort, sore throat  Respiratory: No dyspnea, cough, wheezing, hemoptysis  CV: No chest pain, PND, orthopnea  GI: No abdominal pain, diarrhea, constipation, nausea, vomiting, melena, hematochezia  : No increased frequency, dysuria, hematuria, nocturia  MSK: No joint pain/swelling; no back pain; no edema  Neuro: No dizziness/lightheadedness, weakness, seizures, numbness, tingling  Heme: No easy bruising or bleeding  Endo: No heat/cold intolerance  Psych: No significant nervousness, anxiety, stress, depression    All other systems were reviewed and are negative, except as noted.    VITALS/PHYSICAL EXAM  --------------------------------------------------------------------------------  T(C): 36.3 (02-28-25 @ 12:05), Max: 37.2 (02-27-25 @ 16:17)  HR: 79 (02-28-25 @ 12:05) (62 - 83)  BP: 159/97 (02-28-25 @ 12:05) (138/79 - 191/85)  RR: 18 (02-28-25 @ 12:05) (18 - 18)  SpO2: 96% (02-28-25 @ 12:05) (94% - 97%)  Wt(kg): --  Height (cm): 149.9 (02-26-25 @ 14:13)  Weight (kg): 61.2 (02-26-25 @ 14:13)  BMI (kg/m2): 27.2 (02-26-25 @ 14:13)  BSA (m2): 1.56 (02-26-25 @ 14:13)      02-27-25 @ 07:01  -  02-28-25 @ 07:00  --------------------------------------------------------  IN: 1070 mL / OUT: 0 mL / NET: 1070 mL      Physical Exam:  	Gen: NAD, well-appearing  	HEENT: PERRL, supple neck, clear oropharynx  	Pulm: CTA B/L  	CV: RRR, S1S2; no rub  	Back: No spinal or CVA tenderness; no sacral edema  	Abd: +BS, soft, nontender/nondistended  	: No suprapubic tenderness  	UE: Warm, FROM, no clubbing, intact strength; no edema; no asterixis  	LE: Warm, FROM, no clubbing, intact strength; no edema  	Neuro: No focal deficits, intact gait  	Psych: Normal affect and mood  	Skin: Warm, without rashes  	Vascular access:    LABS/STUDIES  --------------------------------------------------------------------------------              8.9    6.86  >-----------<  142      [02-26-25 @ 20:38]              26.2                 Creatinine Trend:  SCr 7.00 [02-26 @ 07:11]  SCr 5.03 [02-25 @ 07:15]  SCr 3.61 [02-24 @ 16:02]  SCr 7.56 [02-24 @ 07:01]  SCr 6.11 [02-23 @ 07:20]    Urinalysis - [02-26-25 @ 07:11]      Color  / Appearance  / SG  / pH       Gluc 87 / Ketone   / Bili  / Urobili        Blood  / Protein  / Leuk Est  / Nitrite       RBC  / WBC  / Hyaline  / Gran  / Sq Epi  / Non Sq Epi  / Bacteria       Iron 22, TIBC 209, %sat 11      [02-21-25 @ 07:06]  Ferritin 935      [02-21-25 @ 07:07]  PTH -- (Ca 6.8)      [02-22-25 @ 07:11]   377  TSH 1.93      [02-21-25 @ 07:07]    HBsAb Reactive      [02-25-25 @ 18:52]  HBsAg Nonreact      [02-24-25 @ 12:59]  HCV 0.21, Nonreact      [02-24-25 @ 12:59]    
[facility] DIVISION OF KIDNEY DISEASES AND HYPERTENSION -- FOLLOW UP NOTE  --------------------------------------------------------------------------------  Chief Complaint:    24 hour events/subjective:        PAST HISTORY  --------------------------------------------------------------------------------  No significant changes to PMH, PSH, FHx, SHx, unless otherwise noted    ALLERGIES & MEDICATIONS  --------------------------------------------------------------------------------  Allergies    No Known Allergies    Intolerances      Standing Inpatient Medications  calcium acetate 1334 milliGRAM(s) Oral three times a day with meals  chlorhexidine 4% Liquid 1 Application(s) Topical <User Schedule>  ferrous    sulfate 325 milliGRAM(s) Oral daily  furosemide   Injectable 80 milliGRAM(s) IV Push two times a day  NIFEdipine XL 60 milliGRAM(s) Oral daily  ondansetron Injectable 4 milliGRAM(s) IV Push once  sodium bicarbonate 650 milliGRAM(s) Oral three times a day    PRN Inpatient Medications  sodium chloride 0.9% lock flush 10 milliLiter(s) IV Push every 1 hour PRN      REVIEW OF SYSTEMS  --------------------------------------------------------------------------------  Gen: No weight changes, fatigue, fevers/chills, weakness  Skin: No rashes  Head/Eyes/Ears/Mouth: No headache; Normal hearing; Normal vision w/o blurriness; No sinus pain/discomfort, sore throat  Respiratory: No dyspnea, cough, wheezing, hemoptysis  CV: No chest pain, PND, orthopnea  GI: No abdominal pain, diarrhea, constipation, nausea, vomiting, melena, hematochezia  : No increased frequency, dysuria, hematuria, nocturia  MSK: No joint pain/swelling; no back pain; no edema  Neuro: No dizziness/lightheadedness, weakness, seizures, numbness, tingling  Heme: No easy bruising or bleeding  Endo: No heat/cold intolerance  Psych: No significant nervousness, anxiety, stress, depression    All other systems were reviewed and are negative, except as noted.    VITALS/PHYSICAL EXAM  --------------------------------------------------------------------------------  T(C): 36.8 (02-26-25 @ 06:20), Max: 37.1 (02-26-25 @ 00:30)  HR: 66 (02-26-25 @ 06:20) (66 - 89)  BP: 139/78 (02-26-25 @ 06:20) (134/64 - 168/76)  RR: 18 (02-26-25 @ 06:20) (18 - 18)  SpO2: 96% (02-26-25 @ 06:20) (94% - 96%)  Wt(kg): --        02-25-25 @ 07:01  -  02-26-25 @ 07:00  --------------------------------------------------------  IN: 960 mL / OUT: 0 mL / NET: 960 mL      Physical Exam:  	Gen: NAD, well-appearing  	HEENT: PERRL, supple neck, clear oropharynx  	Pulm: CTA B/L  	CV: RRR, S1S2; no rub  	Back: No spinal or CVA tenderness; no sacral edema  	Abd: +BS, soft, nontender/nondistended  	: No suprapubic tenderness  	UE: Warm, FROM, no clubbing, intact strength; no edema; no asterixis  	LE: Warm, FROM, no clubbing, intact strength; no edema  	Neuro: No focal deficits, intact gait  	Psych: Normal affect and mood  	Skin: Warm, without rashes  	Vascular access:    LABS/STUDIES  --------------------------------------------------------------------------------              6.8    5.65  >-----------<  138      [02-26-25 @ 07:12]              20.9     137  |  97  |  72  ----------------------------<  87      [02-26-25 @ 07:11]  4.1   |  25  |  7.00        Ca     8.8     [02-26-25 @ 07:11]      Mg     2.1     [02-25-25 @ 07:15]      Phos  4.0     [02-25-25 @ 07:15]    TPro  6.2  /  Alb  3.2  /  TBili  0.2  /  DBili  x   /  AST  20  /  ALT  13  /  AlkPhos  89  [02-26-25 @ 07:11]    PT/INR: PT 11.6 , INR 1.01       [02-26-25 @ 07:07]  PTT: 31.3       [02-26-25 @ 07:07]      Creatinine Trend:  SCr 7.00 [02-26 @ 07:11]  SCr 5.03 [02-25 @ 07:15]  SCr 3.61 [02-24 @ 16:02]  SCr 7.56 [02-24 @ 07:01]  SCr 6.11 [02-23 @ 07:20]    Urinalysis - [02-26-25 @ 07:11]      Color  / Appearance  / SG  / pH       Gluc 87 / Ketone   / Bili  / Urobili        Blood  / Protein  / Leuk Est  / Nitrite       RBC  / WBC  / Hyaline  / Gran  / Sq Epi  / Non Sq Epi  / Bacteria     Urine Creatinine 80      [02-20-25 @ 10:15]  Urine Protein 357      [02-20-25 @ 10:15]  Urine Sodium 15      [02-20-25 @ 10:15]  Urine Urea Nitrogen 365      [02-20-25 @ 10:15]  Urine Potassium 26      [02-20-25 @ 10:15]  Urine Osmolality 228      [02-20-25 @ 10:15]    Iron 22, TIBC 209, %sat 11      [02-21-25 @ 07:06]  Ferritin 935      [02-21-25 @ 07:07]  PTH -- (Ca 6.8)      [02-22-25 @ 07:11]   377  TSH 1.93      [02-21-25 @ 07:07]    HBsAg Nonreact      [02-24-25 @ 12:59]  HCV 0.21, Nonreact      [02-24-25 @ 12:59]    
DATE OF SERVICE: 02-24-25 @ 12:52    Patient is a 59y old  Female who presents with a chief complaint of sob (23 Feb 2025 10:09)      SUBJECTIVE / OVERNIGHT EVENTS:    MEDICATIONS  (STANDING):  calcium acetate 1334 milliGRAM(s) Oral three times a day with meals  chlorhexidine 4% Liquid 1 Application(s) Topical <User Schedule>  ferrous    sulfate 325 milliGRAM(s) Oral daily  furosemide   Injectable 80 milliGRAM(s) IV Push two times a day  heparin   Injectable 5000 Unit(s) SubCutaneous every 8 hours  NIFEdipine XL 60 milliGRAM(s) Oral daily  sodium bicarbonate 650 milliGRAM(s) Oral three times a day    MEDICATIONS  (PRN):  sodium chloride 0.9% lock flush 10 milliLiter(s) IV Push every 1 hour PRN Pre/post blood products, medications, blood draw, and to maintain line patency      Vital Signs Last 24 Hrs  T(C): 36.6 (24 Feb 2025 11:48), Max: 37.2 (24 Feb 2025 00:00)  T(F): 97.8 (24 Feb 2025 11:48), Max: 99 (24 Feb 2025 00:00)  HR: 79 (24 Feb 2025 11:48) (65 - 92)  BP: 140/83 (24 Feb 2025 11:48) (131/78 - 167/83)  BP(mean): --  RR: 18 (24 Feb 2025 11:48) (17 - 18)  SpO2: 95% (24 Feb 2025 11:48) (95% - 98%)    Parameters below as of 24 Feb 2025 11:48  Patient On (Oxygen Delivery Method): room air      CAPILLARY BLOOD GLUCOSE        I&O's Summary    23 Feb 2025 07:01  -  24 Feb 2025 07:00  --------------------------------------------------------  IN: 1180 mL / OUT: 0 mL / NET: 1180 mL    24 Feb 2025 07:01  -  24 Feb 2025 12:52  --------------------------------------------------------  IN: 0 mL / OUT: 1600 mL / NET: -1600 mL        PHYSICAL EXAM:  GENERAL: NAD, well-developed  HEAD:  Atraumatic, Normocephalic  EYES: EOMI, PERRLA, conjunctiva and sclera clear  NECK: Supple, No JVD  CHEST/LUNG: Clear to auscultation bilaterally; No wheeze  HEART: Regular rate and rhythm; No murmurs, rubs, or gallops  ABDOMEN: Soft, Nontender, Nondistended; Bowel sounds present  EXTREMITIES:  2+ Peripheral Pulses, No clubbing, cyanosis, or edema  PSYCH: AAOx3  NEUROLOGY: non-focal  SKIN: No rashes or lesions    LABS:                        7.4    6.75  )-----------( 156      ( 24 Feb 2025 07:04 )             21.9     02-24    136  |  95[L]  |  71[H]  ----------------------------<  90  4.0   |  23  |  7.56[H]    Ca    8.4      24 Feb 2025 07:01  Phos  4.3     02-24  Mg     2.3     02-24      PT/INR - ( 24 Feb 2025 07:05 )   PT: 11.3 sec;   INR: 0.99 ratio         PTT - ( 24 Feb 2025 07:05 )  PTT:31.8 sec      Urinalysis Basic - ( 24 Feb 2025 07:01 )    Color: x / Appearance: x / SG: x / pH: x  Gluc: 90 mg/dL / Ketone: x  / Bili: x / Urobili: x   Blood: x / Protein: x / Nitrite: x   Leuk Esterase: x / RBC: x / WBC x   Sq Epi: x / Non Sq Epi: x / Bacteria: x        RADIOLOGY & ADDITIONAL TESTS:    Imaging Personally Reviewed:    Consultant(s) Notes Reviewed:      Care Discussed with Consultants/Other Providers:  
DATE OF SERVICE: 02-28-25 @ 13:04    Patient is a 59y old  Female who presents with a chief complaint of sob (28 Feb 2025 12:37)      SUBJECTIVE / OVERNIGHT EVENTS:  No chest pain. No shortness of breath. No complaints. No events overnight. PD catheter was scheduled for today but pt refused    MEDICATIONS  (STANDING):  calcium acetate 1334 milliGRAM(s) Oral three times a day with meals  chlorhexidine 4% Liquid 1 Application(s) Topical <User Schedule>  ferrous    sulfate 325 milliGRAM(s) Oral daily  furosemide   Injectable 80 milliGRAM(s) IV Push two times a day  NIFEdipine XL 60 milliGRAM(s) Oral daily  ondansetron Injectable 4 milliGRAM(s) IV Push once  sodium bicarbonate 650 milliGRAM(s) Oral three times a day    MEDICATIONS  (PRN):  sodium chloride 0.9% lock flush 10 milliLiter(s) IV Push every 1 hour PRN Pre/post blood products, medications, blood draw, and to maintain line patency      Vital Signs Last 24 Hrs  T(C): 36.3 (28 Feb 2025 12:05), Max: 37.2 (27 Feb 2025 16:17)  T(F): 97.3 (28 Feb 2025 12:05), Max: 99 (27 Feb 2025 20:05)  HR: 79 (28 Feb 2025 12:05) (62 - 83)  BP: 159/97 (28 Feb 2025 12:05) (138/79 - 191/85)  BP(mean): --  RR: 18 (28 Feb 2025 12:05) (18 - 18)  SpO2: 96% (28 Feb 2025 12:05) (94% - 96%)    Parameters below as of 28 Feb 2025 12:05  Patient On (Oxygen Delivery Method): room air      CAPILLARY BLOOD GLUCOSE        I&O's Summary    27 Feb 2025 07:01  -  28 Feb 2025 07:00  --------------------------------------------------------  IN: 1070 mL / OUT: 0 mL / NET: 1070 mL        PHYSICAL EXAM:  GENERAL: NAD, well-developed  HEAD:  Atraumatic, Normocephalic  EYES: EOMI, PERRLA, conjunctiva and sclera clear  NECK: Supple, No JVD  CHEST/LUNG: Clear to auscultation bilaterally; No wheeze  HEART: Regular rate and rhythm; No murmurs, rubs, or gallops  ABDOMEN: Soft, Nontender, Nondistended; Bowel sounds present  EXTREMITIES:  2+ Peripheral Pulses, No clubbing, cyanosis, or edema  PSYCH: AAOx3  NEUROLOGY: non-focal  SKIN: No rashes or lesions    LABS:                        8.9    6.86  )-----------( 142      ( 26 Feb 2025 20:38 )             26.2                     RADIOLOGY & ADDITIONAL TESTS:    Imaging Personally Reviewed:    Consultant(s) Notes Reviewed:      Care Discussed with Consultants/Other Providers:  
[facility] DIVISION OF KIDNEY DISEASES AND HYPERTENSION -- FOLLOW UP NOTE  --------------------------------------------------------------------------------  Chief Complaint:    24 hour events/subjective:        PAST HISTORY  --------------------------------------------------------------------------------  No significant changes to PMH, PSH, FHx, SHx, unless otherwise noted    ALLERGIES & MEDICATIONS  --------------------------------------------------------------------------------  Allergies    No Known Allergies    Intolerances      Standing Inpatient Medications  calcium acetate 1334 milliGRAM(s) Oral three times a day with meals  chlorhexidine 4% Liquid 1 Application(s) Topical <User Schedule>  ferrous    sulfate 325 milliGRAM(s) Oral daily  furosemide   Injectable 80 milliGRAM(s) IV Push two times a day  NIFEdipine XL 60 milliGRAM(s) Oral daily  ondansetron Injectable 4 milliGRAM(s) IV Push once  sodium bicarbonate 650 milliGRAM(s) Oral three times a day    PRN Inpatient Medications  sodium chloride 0.9% lock flush 10 milliLiter(s) IV Push every 1 hour PRN      REVIEW OF SYSTEMS  --------------------------------------------------------------------------------  Gen: No weight changes, fatigue, fevers/chills, weakness  Skin: No rashes  Head/Eyes/Ears/Mouth: No headache; Normal hearing; Normal vision w/o blurriness; No sinus pain/discomfort, sore throat  Respiratory: No dyspnea, cough, wheezing, hemoptysis  CV: No chest pain, PND, orthopnea  GI: No abdominal pain, diarrhea, constipation, nausea, vomiting, melena, hematochezia  : No increased frequency, dysuria, hematuria, nocturia  MSK: No joint pain/swelling; no back pain; no edema  Neuro: No dizziness/lightheadedness, weakness, seizures, numbness, tingling  Heme: No easy bruising or bleeding  Endo: No heat/cold intolerance  Psych: No significant nervousness, anxiety, stress, depression    All other systems were reviewed and are negative, except as noted.    VITALS/PHYSICAL EXAM  --------------------------------------------------------------------------------  T(C): 37 (02-25-25 @ 09:15), Max: 37.4 (02-25-25 @ 05:06)  HR: 78 (02-25-25 @ 09:15) (60 - 82)  BP: 129/81 (02-25-25 @ 09:15) (126/80 - 175/90)  RR: 18 (02-25-25 @ 09:15) (18 - 18)  SpO2: 94% (02-25-25 @ 09:15) (94% - 98%)  Wt(kg): --        02-24-25 @ 07:01  -  02-25-25 @ 07:00  --------------------------------------------------------  IN: 0 mL / OUT: 1600 mL / NET: -1600 mL      Physical Exam:  	Gen: NAD, well-appearing  	HEENT: PERRL, supple neck, clear oropharynx  	Pulm: CTA B/L  	CV: RRR, S1S2; no rub  	Back: No spinal or CVA tenderness; no sacral edema  	Abd: +BS, soft, nontender/nondistended  	: No suprapubic tenderness  	UE: Warm, FROM, no clubbing, intact strength; no edema; no asterixis  	LE: Warm, FROM, no clubbing, intact strength; no edema  	Neuro: No focal deficits, intact gait  	Psych: Normal affect and mood  	Skin: Warm, without rashes  	Vascular access:    LABS/STUDIES  --------------------------------------------------------------------------------              7.5    6.30  >-----------<  168      [02-25-25 @ 07:19]              22.6     138  |  96  |  42  ----------------------------<  88      [02-25-25 @ 07:15]  4.1   |  29  |  5.03        Ca     8.6     [02-25-25 @ 07:15]      Mg     2.1     [02-25-25 @ 07:15]      Phos  4.0     [02-25-25 @ 07:15]      PT/INR: PT 11.3 , INR 0.99       [02-24-25 @ 07:05]  PTT: 31.8       [02-24-25 @ 07:05]      Creatinine Trend:  SCr 5.03 [02-25 @ 07:15]  SCr 3.61 [02-24 @ 16:02]  SCr 7.56 [02-24 @ 07:01]  SCr 6.11 [02-23 @ 07:20]  SCr 8.07 [02-22 @ 07:11]    Urinalysis - [02-25-25 @ 07:15]      Color  / Appearance  / SG  / pH       Gluc 88 / Ketone   / Bili  / Urobili        Blood  / Protein  / Leuk Est  / Nitrite       RBC  / WBC  / Hyaline  / Gran  / Sq Epi  / Non Sq Epi  / Bacteria     Urine Creatinine 80      [02-20-25 @ 10:15]  Urine Protein 357      [02-20-25 @ 10:15]  Urine Sodium 15      [02-20-25 @ 10:15]  Urine Urea Nitrogen 365      [02-20-25 @ 10:15]  Urine Potassium 26      [02-20-25 @ 10:15]  Urine Osmolality 228      [02-20-25 @ 10:15]    Iron 22, TIBC 209, %sat 11      [02-21-25 @ 07:06]  Ferritin 935      [02-21-25 @ 07:07]  PTH -- (Ca 6.8)      [02-22-25 @ 07:11]   377  TSH 1.93      [02-21-25 @ 07:07]    HBsAg Nonreact      [02-24-25 @ 12:59]  HCV 0.21, Nonreact      [02-24-25 @ 12:59]    
[facility] DIVISION OF KIDNEY DISEASES AND HYPERTENSION --    Reason for consult: Advanced CKD    24 hour events/subjective: Patient seen and examined during HD. States LE edema has improved. Denies SOB, has mild chest wall tenderness. No n/v/d.      PAST HISTORY  --------------------------------------------------------------------------------  No significant changes to PMH, PSH, FHx, SHx, unless otherwise noted    ALLERGIES & MEDICATIONS  --------------------------------------------------------------------------------  Allergies    No Known Allergies      Standing Inpatient Medications  ferrous    sulfate 325 milliGRAM(s) Oral daily  furosemide   Injectable 80 milliGRAM(s) IV Push two times a day  heparin   Injectable 5000 Unit(s) SubCutaneous every 8 hours  hydrALAZINE 25 milliGRAM(s) Oral two times a day  NIFEdipine XL 60 milliGRAM(s) Oral daily  sodium bicarbonate 650 milliGRAM(s) Oral three times a day    PRN Inpatient Medications      REVIEW OF SYSTEMS  --------------------------------------------------------------------------------  Gen: No fever  Respiratory: No dyspnea  CV: No chest pain  GI: No abdominal pain, diarrhea, constipation, nausea, vomiting  : No increased frequency, dysuria, hematuria  Skin: No rashes  MSK: +LE edema  Neuro: No dizziness/lightheadedness    All other systems were reviewed and are negative, except as noted.    VITALS/PHYSICAL EXAM  --------------------------------------------------------------------------------  T(C): 36.6 (02-21-25 @ 09:18), Max: 37 (02-20-25 @ 13:07)  HR: 84 (02-21-25 @ 09:18) (64 - 85)  BP: 101/64 (02-21-25 @ 09:18) (101/64 - 177/95)  RR: 18 (02-21-25 @ 09:18) (18 - 21)  SpO2: 95% (02-21-25 @ 09:18) (95% - 99%)  Wt(kg): --  Height (cm): 149.9 (02-20-25 @ 08:09)  Weight (kg): 54 (02-20-25 @ 08:09)  BMI (kg/m2): 24 (02-20-25 @ 08:09)  BSA (m2): 1.48 (02-20-25 @ 08:09)      02-20-25 @ 07:01  -  02-21-25 @ 07:00  --------------------------------------------------------  IN: 240 mL / OUT: 1350 mL / NET: -1110 mL    02-21-25 @ 07:01  -  02-21-25 @ 11:06  --------------------------------------------------------  IN: 240 mL / OUT: 0 mL / NET: 240 mL      PHYSICAL EXAM:  Gen: NAD  Neuro: non-focal, slight asterixis  HEENT: +JVD  Pulm: CTA B/L  CV: +S1S2  Abd: soft, non-tender/non-distended  Extremities: ++pitting B/L LE edema  Skin: Warm  Access: RIJ non tunneled dialysis catheter in situ, site non tender, dry without any active bleed    LABS/STUDIES  --------------------------------------------------------------------------------              7.6    10.49 >-----------<  157      [02-21-25 @ 07:06]              21.4     121  |  76  |  99  ----------------------------<  102      [02-21-25 @ 07:06]  3.6   |  16  |  10.89        Ca     6.6     [02-21-25 @ 07:06]      Mg     2.2     [02-21-25 @ 07:06]      Phos  11.0     [02-21-25 @ 07:06]    TPro  7.9  /  Alb  4.2  /  TBili  0.5  /  DBili  x   /  AST  22  /  ALT  14  /  AlkPhos  109  [02-20-25 @ 08:47]    PT/INR: PT 11.6 , INR 1.02       [02-20-25 @ 08:47]  PTT: 34.9       [02-20-25 @ 08:47]    Serum Osmolality 289      [02-21-25 @ 00:23]    Creatinine Trend:  SCr 10.89 [02-21 @ 07:06]  SCr 10.77 [02-21 @ 00:23]  SCr 10.62 [02-20 @ 08:47]      Urine Creatinine 80      [02-20-25 @ 10:15]  Urine Protein 357      [02-20-25 @ 10:15]  Urine Sodium 15      [02-20-25 @ 10:15]  Urine Urea Nitrogen 365      [02-20-25 @ 10:15]  Urine Potassium 26      [02-20-25 @ 10:15]  Urine Osmolality 228      [02-20-25 @ 10:15]      HBsAg Nonreact      [02-20-25 @ 20:02]

## 2025-03-04 NOTE — DISCHARGE NOTE NURSING/CASE MANAGEMENT/SOCIAL WORK - NSDCFUADDAPPT_GEN_ALL_CORE_FT
APPTS ARE READY TO BE MADE:  X] YES    Best Family or Patient Contact (if needed):    Additional Information about above appointments (if needed):    1:   2:   3:     Other comments or requests:

## 2025-03-04 NOTE — DISCHARGE NOTE NURSING/CASE MANAGEMENT/SOCIAL WORK - PATIENT PORTAL LINK FT
You can access the FollowMyHealth Patient Portal offered by [facility] by registering at the following website: [email or web address]. By joining [facility]’s FollowMyHealth portal, you will also be able to view your health information using other applications (apps) compatible with our system.

## 2025-03-04 NOTE — DISCHARGE NOTE PROVIDER - NSDCFUSCHEDAPPT_GEN_ALL_CORE_FT
Yossi Palma  [facility]  [address]  Scheduled Appointment: 04/23/2025    Caterina Evangelista  [facility]  [address]  Scheduled Appointment: 04/24/2025

## 2025-03-04 NOTE — DISCHARGE NOTE PROVIDER - NSDCCPCAREPLAN_GEN_ALL_CORE_FT
PRINCIPAL DISCHARGE DIAGNOSIS  Diagnosis: ESRD on dialysis  Assessment and Plan of Treatment: initiated on HD on 2/21 via non tunneled dialysis catheter. Underwent RIJ TDC placement on 2/26. Was initially planning for PD, but pt now refusing and will consider as outpt. For now plan to continue HD, next HD on 3/5.   Pt has MWF spot at [facility] at 630pm, can be discharged once quantiferone test  resulted.         SECONDARY DISCHARGE DIAGNOSES  Diagnosis: Anemia secondary to renal failure  Assessment and Plan of Treatment:   - Will dose JYOTSNA w/ HD   - Can give IV iron as outpt on discharge  no urgency to start now  as per kidney doctor.    Diagnosis: Hyponatremia  Assessment and Plan of Treatment: Noted low sodium level. resolved with HD  -Continue torsemide 40mg qd on non dialysis days      Diagnosis: Hypocalcemia  Assessment and Plan of Treatment:    Hypocalcemia in the setting of Hyperphosphatemia and advanced CKD  - iPTH elevated 377, in the setting of secondary hyperparathyroidism, levels in acceptable range   - Continue calcium acetate 1334mg three times a day with meals.       PRINCIPAL DISCHARGE DIAGNOSIS  Diagnosis: ESRD on dialysis  Assessment and Plan of Treatment: initiated on HD on 2/21 via non tunneled dialysis catheter. Underwent RIJ TDC placement on 2/26. Was initially planning for PD, but pt now refusing and will consider as outpt. For now plan to continue HD, next HD on 3/5.   Pt has MWF spot at [facility] at 630pm,         SECONDARY DISCHARGE DIAGNOSES  Diagnosis: Anemia secondary to renal failure  Assessment and Plan of Treatment:   - Will dose JYOTSNA w/ HD   - Can give IV iron as outpt on discharge  no urgency to start now  as per kidney doctor.    Diagnosis: Hyponatremia  Assessment and Plan of Treatment: Noted low sodium level. resolved with HD  -Continue torsemide 40mg qd on non dialysis days      Diagnosis: Hypocalcemia  Assessment and Plan of Treatment:    Hypocalcemia in the setting of Hyperphosphatemia and advanced CKD  - iPTH elevated 377, in the setting of secondary hyperparathyroidism, levels in acceptable range   - Continue calcium acetate 1334mg three times a day with meals.

## 2025-03-04 NOTE — DISCHARGE NOTE NURSING/CASE MANAGEMENT/SOCIAL WORK - NSDCCRNAME_GEN_ALL_CORE_FT
CHIEF COMPLAINT:  No chief complaint on file.      HISTORY OF PRESENT ILLNESS:  Mr. Dodson is a 64 year old male who presents for evaluation of left foot and ankle pain he points dorsally at the left midfoot and posterior posterolateral at the ankle and calf.  He denies injury.  Says pain is little bit worse with activity.  Swelling is a significant symptom.  He reports history of “sciatica” on the other side but nothing on the left.  Numbness does not seem to be a major component of his symptoms.  He has not tried a brace or had an ankle joint steroid injection.    Office note from Dr. Torres on 11/1/23 is reviewed. Mention of chronic left ankle pain. MRI was ordered.     Dr. Jerez notes are also reviewed.    PAST MEDICAL HISTORY:   has a past medical history of Allergy, Arthritis, Blood clot associated with vein wall inflammation (06/21/2018), Depression, Essential (primary) hypertension, Kidney stones (2020), and Migraine.    He has no past medical history of Sleep apnea.    MEDICATIONS:  has a current medication list which includes the following prescription(s): atorvastatin, tirzepatide, aspirin, acetaminophen, and multiple vitamin.    ALLERGIES:  ALLERGIES:   Allergen Reactions    Iodine Angioedema    Shellfish Allergy   (Food Or Med) ANAPHYLAXIS        SOCIAL HISTORY:   reports that he has never smoked. He has never used smokeless tobacco. He reports that he does not drink alcohol and does not use drugs.    FAMILY HISTORY:  family history includes Atrial Fibrilliation in his brother; Hyperlipidemia in his brother and sister; Other in his father and mother.    REVIEW OF SYSTEMS:  Denies new onset chest pain or shortness of breath today    PHYSICAL EXAMINATION:  There were no vitals taken for this visit. There is no height or weight on file to calculate BMI.    General:  This is an alert male in NAD, appropriately oriented to person.    Mood and affect are normal and he is pleasant and cooperative with  Mikhail Loya exam.  Respirations are unlabored and hearing is intact to spoken word.    Extremity Exam  Evaluation left foot demonstrates a palpable DP pulse sensation present to light touch superficial and deep peroneal nerve distribution as well as tibial.  He has 5/5 tibialis anterior gastroc soleus posterior tibial tendon peroneals.  He has no tenderness over the peroneal tendons on exam today and no tenderness over the ankle joint line.  No erythema minimal swelling.    IMAGING:  Imaging studies reviewed.    MRI left ankle 11/20/23:  IMPRESSION:  1. 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 Correlate for peroneal symptoms.  2. Mild soft tissue under the anterolateral midfoot skin marker, otherwise  unremarkable.    Standing x-rays obtained today demonstrate an anterior ankle joint osteophyte but no significant joint space narrowing.    IMPRESSION / DIAGNOSIS:  Left dorsal foot pain  History of PE  Left posterior ankle and calf pain  H/o LLE edema    PLAN:  We discussed that overall the location of his pain does not really correlate to the ankle joint line and the location of the tibial osteophyte though it is possible this could be a source of pain.  Discussed the possibility of a lace-up ankle brace or steroid injection in the ankle.  Otherwise his MRI is pretty unimpressive.  He does not have tenderness at the peroneal tendons and my interpretation of the pathology on MRI is fairly minimal.  I think options in the future would be lace-up ankle brace which he declined today as well as diagnostic ankle joint steroid joint injection (atc) which he declines for the time being.  He is invited to follow up as needed.

## 2025-03-04 NOTE — DISCHARGE NOTE NURSING/CASE MANAGEMENT/SOCIAL WORK - NSDCCRTYPESERV_GEN_ALL_CORE_FT
Outpatient Hemodialysis Mon-Wed-Fri 630pm (initial tx 3/5/25 facility is requesting patient to arrive for 130pm for intake)

## 2025-03-05 RX ORDER — TORSEMIDE 10 MG
1 TABLET ORAL
Qty: 0 | Refills: 0 | DISCHARGE
Start: 2025-03-05

## 2025-03-26 PROCEDURE — 82728 ASSAY OF FERRITIN: CPT

## 2025-03-26 PROCEDURE — 77001 FLUOROGUIDE FOR VEIN DEVICE: CPT

## 2025-03-26 PROCEDURE — 83735 ASSAY OF MAGNESIUM: CPT

## 2025-03-26 PROCEDURE — 83880 ASSAY OF NATRIURETIC PEPTIDE: CPT

## 2025-03-26 PROCEDURE — 82947 ASSAY GLUCOSE BLOOD QUANT: CPT

## 2025-03-26 PROCEDURE — 85730 THROMBOPLASTIN TIME PARTIAL: CPT

## 2025-03-26 PROCEDURE — 87635 SARS-COV-2 COVID-19 AMP PRB: CPT

## 2025-03-26 PROCEDURE — 86900 BLOOD TYPING SEROLOGIC ABO: CPT

## 2025-03-26 PROCEDURE — 82553 CREATINE MB FRACTION: CPT

## 2025-03-26 PROCEDURE — 82746 ASSAY OF FOLIC ACID SERUM: CPT

## 2025-03-26 PROCEDURE — 86480 TB TEST CELL IMMUN MEASURE: CPT

## 2025-03-26 PROCEDURE — 36430 TRANSFUSION BLD/BLD COMPNT: CPT

## 2025-03-26 PROCEDURE — 84100 ASSAY OF PHOSPHORUS: CPT

## 2025-03-26 PROCEDURE — 36556 INSERT NON-TUNNEL CV CATH: CPT

## 2025-03-26 PROCEDURE — 84300 ASSAY OF URINE SODIUM: CPT

## 2025-03-26 PROCEDURE — 85379 FIBRIN DEGRADATION QUANT: CPT

## 2025-03-26 PROCEDURE — 85610 PROTHROMBIN TIME: CPT

## 2025-03-26 PROCEDURE — 93356 MYOCRD STRAIN IMG SPCKL TRCK: CPT

## 2025-03-26 PROCEDURE — 82803 BLOOD GASES ANY COMBINATION: CPT

## 2025-03-26 PROCEDURE — 99261: CPT

## 2025-03-26 PROCEDURE — 84443 ASSAY THYROID STIM HORMONE: CPT

## 2025-03-26 PROCEDURE — 84133 ASSAY OF URINE POTASSIUM: CPT

## 2025-03-26 PROCEDURE — 81001 URINALYSIS AUTO W/SCOPE: CPT

## 2025-03-26 PROCEDURE — 86923 COMPATIBILITY TEST ELECTRIC: CPT

## 2025-03-26 PROCEDURE — 82435 ASSAY OF BLOOD CHLORIDE: CPT

## 2025-03-26 PROCEDURE — 80053 COMPREHEN METABOLIC PANEL: CPT

## 2025-03-26 PROCEDURE — 87340 HEPATITIS B SURFACE AG IA: CPT

## 2025-03-26 PROCEDURE — 82310 ASSAY OF CALCIUM: CPT

## 2025-03-26 PROCEDURE — 83550 IRON BINDING TEST: CPT

## 2025-03-26 PROCEDURE — C1750: CPT

## 2025-03-26 PROCEDURE — C1752: CPT

## 2025-03-26 PROCEDURE — 85027 COMPLETE CBC AUTOMATED: CPT

## 2025-03-26 PROCEDURE — 84295 ASSAY OF SERUM SODIUM: CPT

## 2025-03-26 PROCEDURE — 85025 COMPLETE CBC W/AUTO DIFF WBC: CPT

## 2025-03-26 PROCEDURE — 96374 THER/PROPH/DIAG INJ IV PUSH: CPT

## 2025-03-26 PROCEDURE — C1769: CPT

## 2025-03-26 PROCEDURE — P9040: CPT

## 2025-03-26 PROCEDURE — 82570 ASSAY OF URINE CREATININE: CPT

## 2025-03-26 PROCEDURE — 83930 ASSAY OF BLOOD OSMOLALITY: CPT

## 2025-03-26 PROCEDURE — 85014 HEMATOCRIT: CPT

## 2025-03-26 PROCEDURE — 83970 ASSAY OF PARATHORMONE: CPT

## 2025-03-26 PROCEDURE — 36558 INSERT TUNNELED CV CATH: CPT

## 2025-03-26 PROCEDURE — 82550 ASSAY OF CK (CPK): CPT

## 2025-03-26 PROCEDURE — 84156 ASSAY OF PROTEIN URINE: CPT

## 2025-03-26 PROCEDURE — 86706 HEP B SURFACE ANTIBODY: CPT

## 2025-03-26 PROCEDURE — 93308 TTE F-UP OR LMTD: CPT

## 2025-03-26 PROCEDURE — 93005 ELECTROCARDIOGRAM TRACING: CPT

## 2025-03-26 PROCEDURE — 82607 VITAMIN B-12: CPT

## 2025-03-26 PROCEDURE — 83935 ASSAY OF URINE OSMOLALITY: CPT

## 2025-03-26 PROCEDURE — 99285 EMERGENCY DEPT VISIT HI MDM: CPT

## 2025-03-26 PROCEDURE — 84484 ASSAY OF TROPONIN QUANT: CPT

## 2025-03-26 PROCEDURE — 71045 X-RAY EXAM CHEST 1 VIEW: CPT

## 2025-03-26 PROCEDURE — 82330 ASSAY OF CALCIUM: CPT

## 2025-03-26 PROCEDURE — 84540 ASSAY OF URINE/UREA-N: CPT

## 2025-03-26 PROCEDURE — C1894: CPT

## 2025-03-26 PROCEDURE — 93306 TTE W/DOPPLER COMPLETE: CPT

## 2025-03-26 PROCEDURE — 76937 US GUIDE VASCULAR ACCESS: CPT

## 2025-03-26 PROCEDURE — 85018 HEMOGLOBIN: CPT

## 2025-03-26 PROCEDURE — 86901 BLOOD TYPING SEROLOGIC RH(D): CPT

## 2025-03-26 PROCEDURE — 71275 CT ANGIOGRAPHY CHEST: CPT | Mod: MC

## 2025-03-26 PROCEDURE — 83605 ASSAY OF LACTIC ACID: CPT

## 2025-03-26 PROCEDURE — 36415 COLL VENOUS BLD VENIPUNCTURE: CPT

## 2025-03-26 PROCEDURE — 80074 ACUTE HEPATITIS PANEL: CPT

## 2025-03-26 PROCEDURE — 80048 BASIC METABOLIC PNL TOTAL CA: CPT

## 2025-03-26 PROCEDURE — 82652 VIT D 1 25-DIHYDROXY: CPT

## 2025-03-26 PROCEDURE — 86850 RBC ANTIBODY SCREEN: CPT

## 2025-03-26 PROCEDURE — 83540 ASSAY OF IRON: CPT

## 2025-03-26 PROCEDURE — P9016: CPT

## 2025-03-26 PROCEDURE — 84132 ASSAY OF SERUM POTASSIUM: CPT

## 2025-04-23 ENCOUNTER — APPOINTMENT (OUTPATIENT)
Dept: NEPHROLOGY | Facility: CLINIC | Age: 60
End: 2025-04-23

## 2025-04-24 ENCOUNTER — APPOINTMENT (OUTPATIENT)
Dept: TRANSPLANT | Facility: CLINIC | Age: 60
End: 2025-04-24

## 2025-04-24 ENCOUNTER — APPOINTMENT (OUTPATIENT)
Dept: NEPHROLOGY | Facility: CLINIC | Age: 60
End: 2025-04-24

## 2025-05-01 ENCOUNTER — APPOINTMENT (OUTPATIENT)
Dept: NEPHROLOGY | Facility: CLINIC | Age: 60
End: 2025-05-01
Payer: COMMERCIAL

## 2025-05-01 VITALS
BODY MASS INDEX: 22.87 KG/M2 | TEMPERATURE: 98.8 F | DIASTOLIC BLOOD PRESSURE: 88 MMHG | HEART RATE: 73 BPM | RESPIRATION RATE: 18 BRPM | HEIGHT: 57 IN | SYSTOLIC BLOOD PRESSURE: 166 MMHG | WEIGHT: 106 LBS | OXYGEN SATURATION: 97 %

## 2025-05-01 DIAGNOSIS — I10 ESSENTIAL (PRIMARY) HYPERTENSION: ICD-10-CM

## 2025-05-01 DIAGNOSIS — N18.6 END STAGE RENAL DISEASE: ICD-10-CM

## 2025-05-01 DIAGNOSIS — Z01.818 ENCOUNTER FOR OTHER PREPROCEDURAL EXAMINATION: ICD-10-CM

## 2025-05-01 DIAGNOSIS — E21.3 HYPERPARATHYROIDISM, UNSPECIFIED: ICD-10-CM

## 2025-05-01 LAB
ALBUMIN SERPL ELPH-MCNC: 4.9 G/DL
ALP BLD-CCNC: 107 U/L
ALT SERPL-CCNC: 13 U/L
ANION GAP SERPL CALC-SCNC: 19 MMOL/L
APPEARANCE: CLEAR
AST SERPL-CCNC: 22 U/L
BACTERIA: NEGATIVE /HPF
BILIRUB SERPL-MCNC: 0.4 MG/DL
BILIRUBIN URINE: NEGATIVE
BLOOD URINE: ABNORMAL
BUN SERPL-MCNC: 31 MG/DL
C PEPTIDE SERPL-MCNC: 6.7 NG/ML
CALCIUM SERPL-MCNC: 9.4 MG/DL
CALCIUM SERPL-MCNC: 9.4 MG/DL
CAST: NORMAL /LPF
CHLORIDE SERPL-SCNC: 90 MMOL/L
CHOLEST SERPL-MCNC: 263 MG/DL
CO2 SERPL-SCNC: 24 MMOL/L
COLOR: YELLOW
CREAT SERPL-MCNC: 4.29 MG/DL
EGFRCR SERPLBLD CKD-EPI 2021: 11 ML/MIN/1.73M2
EPITHELIAL CELLS: 5 /HPF
GLUCOSE QUALITATIVE U: NEGATIVE
GLUCOSE SERPL-MCNC: 93 MG/DL
HDLC SERPL-MCNC: 104 MG/DL
KETONES URINE: NEGATIVE
LDLC SERPL-MCNC: 140 MG/DL
LEUKOCYTE ESTERASE URINE: NEGATIVE
MAGNESIUM SERPL-MCNC: 2.5 MG/DL
MICROSCOPIC-UA: NORMAL
NITRITE URINE: NEGATIVE
NONHDLC SERPL-MCNC: 159 MG/DL
PARATHYROID HORMONE INTACT: 125 PG/ML
PH URINE: 7
PHOSPHATE SERPL-MCNC: 4.4 MG/DL
POTASSIUM SERPL-SCNC: 4.6 MMOL/L
PROT SERPL-MCNC: 8.4 G/DL
PROTEIN URINE: ABNORMAL
RED BLOOD CELLS URINE: 13 /HPF
REVIEW: NORMAL
SODIUM SERPL-SCNC: 134 MMOL/L
SPECIFIC GRAVITY URINE: 1.02
TRIGL SERPL-MCNC: 114 MG/DL
UROBILINOGEN URINE: NORMAL
WHITE BLOOD CELLS URINE: 4 /HPF

## 2025-05-01 PROCEDURE — 99215 OFFICE O/P EST HI 40 MIN: CPT

## 2025-05-05 LAB
ABORH: NORMAL
CMV IGG SERPL QL: 8.6 U/ML
CMV IGG SERPL-IMP: POSITIVE
CREAT SPEC-SCNC: 76 MG/DL
CREAT/PROT UR: 10.7 RATIO
EBV DNA SERPL NAA+PROBE-ACNC: NOT DETECTED IU/ML
EBV EA AB SER IA-ACNC: 40.2 U/ML
EBV EA AB TITR SER IF: POSITIVE
EBV EA IGG SER QL IA: >600 U/ML
EBV EA IGG SER-ACNC: POSITIVE
EBV EA IGM SER IA-ACNC: NEGATIVE
EBV PATRN SPEC IB-IMP: NORMAL
EBV VCA IGG SER IA-ACNC: >750 U/ML
EBV VCA IGM SER QL IA: <10 U/ML
EBVPCR LOG: NOT DETECTED LOG10IU/ML
EPSTEIN-BARR VIRUS CAPSID ANTIGEN IGG: POSITIVE
ESTIMATED AVERAGE GLUCOSE: 88 MG/DL
HAV IGM SER QL: NONREACTIVE
HBA1C MFR BLD HPLC: 4.7 %
HBV CORE IGG+IGM SER QL: NONREACTIVE
HBV SURFACE AB SER QL: REACTIVE
HBV SURFACE AB SERPL IA-ACNC: 45.8 MIU/ML
HBV SURFACE AG SER QL: NONREACTIVE
HCV AB SER QL: NONREACTIVE
HCV S/CO RATIO: 0.33 S/CO
HEPATITIS A IGG ANTIBODY: REACTIVE
HIV1+2 AB SPEC QL IA.RAPID: NONREACTIVE
HSV 1+2 IGG SER IA-IMP: NEGATIVE
HSV 1+2 IGG SER IA-IMP: POSITIVE
HSV1 IGG SER QL: 33.4 INDEX
HSV2 IGG SER QL: 0.12 INDEX
M TB IFN-G BLD-IMP: NEGATIVE
MEV IGG FLD QL IA: >300 AU/ML
MEV IGG+IGM SER-IMP: POSITIVE
MUV AB SER-ACNC: POSITIVE
MUV IGG SER QL IA: 15.5 AU/ML
PROT UR-MCNC: 815 MG/DL
QUANTIFERON TB PLUS MITOGEN MINUS NIL: >10 IU/ML
QUANTIFERON TB PLUS NIL: 0.07 IU/ML
QUANTIFERON TB PLUS TB1 MINUS NIL: 0 IU/ML
QUANTIFERON TB PLUS TB2 MINUS NIL: 0 IU/ML
RUBV IGG FLD-ACNC: 4.21 INDEX
RUBV IGG FLD-ACNC: 4.21 INDEX
RUBV IGG SER-IMP: POSITIVE
RUBV IGG SER-IMP: POSITIVE
STRONGYLOIDES AB SER IA-ACNC: NEGATIVE
T GONDII AB SER-IMP: POSITIVE
T GONDII IGG SER QL: 22.4 IU/ML
T PALLIDUM AB SER QL IA: NEGATIVE
VZV AB TITR SER: POSITIVE
VZV AB TITR SER: POSITIVE
VZV IGG SER IF-ACNC: 24.6 S/CO
VZV IGG SER IF-ACNC: 24.6 S/CO

## 2025-05-06 DIAGNOSIS — E83.39 OTHER DISORDERS OF PHOSPHORUS METABOLISM: ICD-10-CM

## 2025-05-06 RX ORDER — CALCIUM ACETATE 667 MG/1
667 CAPSULE ORAL
Qty: 540 | Refills: 3 | Status: ACTIVE | COMMUNITY
Start: 2025-05-06 | End: 1900-01-01

## 2025-05-07 RX ORDER — CALCIUM ACETATE 667 MG
667 TABLET ORAL 3 TIMES DAILY
Qty: 180 | Refills: 11 | Status: ACTIVE | COMMUNITY
Start: 2025-05-07 | End: 1900-01-01

## 2025-05-15 ENCOUNTER — APPOINTMENT (OUTPATIENT)
Dept: VASCULAR SURGERY | Facility: CLINIC | Age: 60
End: 2025-05-15
Payer: MEDICAID

## 2025-05-15 PROCEDURE — 93986 DUP-SCAN HEMO COMPL UNI STD: CPT

## 2025-05-15 PROCEDURE — 99204 OFFICE O/P NEW MOD 45 MIN: CPT

## 2025-05-15 RX ORDER — OLMESARTAN MEDOXOMIL 20 MG/1
20 TABLET, FILM COATED ORAL DAILY
Qty: 90 | Refills: 3 | Status: ACTIVE | COMMUNITY
Start: 2025-05-15 | End: 1900-01-01

## 2025-05-16 NOTE — BH CONSULTATION LIAISON ASSESSMENT NOTE - EMPLOYMENT
Continued Stay Note  [facility]     Patient Name: Mitch Jewell  MRN: [id number]  Nori Hines RN     Retired

## 2025-05-22 ENCOUNTER — APPOINTMENT (OUTPATIENT)
Dept: TRANSPLANT | Facility: CLINIC | Age: 60
End: 2025-05-22

## 2025-05-22 ENCOUNTER — OUTPATIENT (OUTPATIENT)
Dept: OUTPATIENT SERVICES | Facility: HOSPITAL | Age: 60
LOS: 1 days | End: 2025-05-22
Payer: COMMERCIAL

## 2025-05-22 ENCOUNTER — APPOINTMENT (OUTPATIENT)
Dept: CT IMAGING | Facility: CLINIC | Age: 60
End: 2025-05-22
Payer: COMMERCIAL

## 2025-05-22 ENCOUNTER — APPOINTMENT (OUTPATIENT)
Dept: RADIOLOGY | Facility: CLINIC | Age: 60
End: 2025-05-22
Payer: COMMERCIAL

## 2025-05-22 ENCOUNTER — APPOINTMENT (OUTPATIENT)
Dept: CARDIOLOGY | Facility: CLINIC | Age: 60
End: 2025-05-22

## 2025-05-22 DIAGNOSIS — Z90.49 ACQUIRED ABSENCE OF OTHER SPECIFIED PARTS OF DIGESTIVE TRACT: Chronic | ICD-10-CM

## 2025-05-22 DIAGNOSIS — Z98.51 TUBAL LIGATION STATUS: Chronic | ICD-10-CM

## 2025-05-22 DIAGNOSIS — Z01.818 ENCOUNTER FOR OTHER PREPROCEDURAL EXAMINATION: ICD-10-CM

## 2025-05-22 PROCEDURE — 71046 X-RAY EXAM CHEST 2 VIEWS: CPT | Mod: 26

## 2025-05-22 PROCEDURE — 74176 CT ABD & PELVIS W/O CONTRAST: CPT | Mod: 26

## 2025-05-22 PROCEDURE — 71046 X-RAY EXAM CHEST 2 VIEWS: CPT

## 2025-05-22 PROCEDURE — 74176 CT ABD & PELVIS W/O CONTRAST: CPT

## 2025-06-10 ENCOUNTER — APPOINTMENT (OUTPATIENT)
Dept: NEPHROLOGY | Facility: CLINIC | Age: 60
End: 2025-06-10

## 2025-06-12 ENCOUNTER — OUTPATIENT (OUTPATIENT)
Dept: OUTPATIENT SERVICES | Facility: HOSPITAL | Age: 60
LOS: 1 days | End: 2025-06-12
Payer: MEDICAID

## 2025-06-12 VITALS
WEIGHT: 104.94 LBS | HEIGHT: 58.27 IN | RESPIRATION RATE: 18 BRPM | HEART RATE: 56 BPM | DIASTOLIC BLOOD PRESSURE: 88 MMHG | TEMPERATURE: 99 F | SYSTOLIC BLOOD PRESSURE: 152 MMHG | OXYGEN SATURATION: 100 %

## 2025-06-12 DIAGNOSIS — Z98.891 HISTORY OF UTERINE SCAR FROM PREVIOUS SURGERY: Chronic | ICD-10-CM

## 2025-06-12 DIAGNOSIS — Z98.51 TUBAL LIGATION STATUS: Chronic | ICD-10-CM

## 2025-06-12 DIAGNOSIS — Z90.49 ACQUIRED ABSENCE OF OTHER SPECIFIED PARTS OF DIGESTIVE TRACT: Chronic | ICD-10-CM

## 2025-06-12 DIAGNOSIS — I10 ESSENTIAL (PRIMARY) HYPERTENSION: ICD-10-CM

## 2025-06-12 DIAGNOSIS — N18.6 END STAGE RENAL DISEASE: ICD-10-CM

## 2025-06-12 DIAGNOSIS — N18.4 CHRONIC KIDNEY DISEASE, STAGE 4 (SEVERE): ICD-10-CM

## 2025-06-12 LAB
ANION GAP SERPL CALC-SCNC: 17 MMOL/L — SIGNIFICANT CHANGE UP (ref 5–17)
BUN SERPL-MCNC: 55 MG/DL — HIGH (ref 7–23)
CALCIUM SERPL-MCNC: 9 MG/DL — SIGNIFICANT CHANGE UP (ref 8.4–10.5)
CHLORIDE SERPL-SCNC: 89 MMOL/L — LOW (ref 96–108)
CO2 SERPL-SCNC: 24 MMOL/L — SIGNIFICANT CHANGE UP (ref 22–31)
CREAT SERPL-MCNC: 5.83 MG/DL — HIGH (ref 0.5–1.3)
EGFR: 8 ML/MIN/1.73M2 — LOW
EGFR: 8 ML/MIN/1.73M2 — LOW
GLUCOSE SERPL-MCNC: 93 MG/DL — SIGNIFICANT CHANGE UP (ref 70–99)
HCT VFR BLD CALC: 37.3 % — SIGNIFICANT CHANGE UP (ref 34.5–45)
HGB BLD-MCNC: 12.7 G/DL — SIGNIFICANT CHANGE UP (ref 11.5–15.5)
MCHC RBC-ENTMCNC: 30.2 PG — SIGNIFICANT CHANGE UP (ref 27–34)
MCHC RBC-ENTMCNC: 34 G/DL — SIGNIFICANT CHANGE UP (ref 32–36)
MCV RBC AUTO: 88.6 FL — SIGNIFICANT CHANGE UP (ref 80–100)
NRBC BLD AUTO-RTO: 0 /100 WBCS — SIGNIFICANT CHANGE UP (ref 0–0)
PLATELET # BLD AUTO: 105 K/UL — LOW (ref 150–400)
POTASSIUM SERPL-MCNC: 5.2 MMOL/L — SIGNIFICANT CHANGE UP (ref 3.5–5.3)
POTASSIUM SERPL-SCNC: 5.2 MMOL/L — SIGNIFICANT CHANGE UP (ref 3.5–5.3)
RBC # BLD: 4.21 M/UL — SIGNIFICANT CHANGE UP (ref 3.8–5.2)
RBC # FLD: 13.3 % — SIGNIFICANT CHANGE UP (ref 10.3–14.5)
SODIUM SERPL-SCNC: 130 MMOL/L — LOW (ref 135–145)
WBC # BLD: 4.05 K/UL — SIGNIFICANT CHANGE UP (ref 3.8–10.5)
WBC # FLD AUTO: 4.05 K/UL — SIGNIFICANT CHANGE UP (ref 3.8–10.5)

## 2025-06-12 PROCEDURE — 36415 COLL VENOUS BLD VENIPUNCTURE: CPT

## 2025-06-12 PROCEDURE — 85027 COMPLETE CBC AUTOMATED: CPT

## 2025-06-12 PROCEDURE — 80048 BASIC METABOLIC PNL TOTAL CA: CPT

## 2025-06-12 PROCEDURE — G0463: CPT

## 2025-06-12 NOTE — H&P PST ADULT - HISTORY OF PRESENT ILLNESS
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 Pt. presents to [facility] for scheduled Colonoscopy as part of becoming a candidate for the kidney transplant list. Denies recent fever, chills, chest pain, SOB, changes in bowel/urinary habits or recent exposure to COVID-19 infection. Pt. denies clotting or bleeding disorders.   Pt. on Farxiga for renal protection - has not been taking last 2 weeks 2/2 UTI which was treated and resolved. Pt. will re-start after colonoscopy   60 yo female w/ PMH HTN, HLD, CKD 2/2 IgA nephropathy, on HD via [facility] permacath (MWF), anemia (received RBC in 2/2025). She was hospitalized 2/20/25-3/4/2025 at [facility] w/ sob r/t ESRD and HD was initiated. Pt. presents to PST for creation of Left brachiocephalic AV fistula on 6/25/25 w/ Dr. Cortez Llanos.   Denies fever, chills, chest pain, palpitations, sob/sousa, dizziness, syncope.     60 yo female w/ PMH HTN, HLD, CKD 2/2 IgA nephropathy, on HD via [facility] permacath (MWF), anemia (received RBC in 2/2025), CVA (?2020; no residual). She was hospitalized 2/20/25-3/4/2025 at [facility] w/ sob r/t ESRD and HD was initiated. Pt. presents to PST for creation of Left brachiocephalic AV fistula on 6/25/25 w/ Dr. Cortez Llanos.   Denies fever, chills, chest pain, palpitations, sob/sousa, dizziness, syncope.

## 2025-06-12 NOTE — H&P PST ADULT - PROBLEM SELECTOR PLAN 1
Pt. presents to PST for creation of Left brachiocephalic AV fistula on 6/25/25 w/ Dr. Cortez Llanos.   Pre-op instructions given. Questions answered.  Venous blood gas for stat K DOS.  Patient will get HD on Mon/Tues/Fri week of surgery; reports HD center/nephrologist is aware.

## 2025-06-12 NOTE — H&P PST ADULT - ASSESSMENT
DASI score: 6.5  DASI activity: mild/mod house chores, walks 30 minutes / day, climbs 1-2 flights of stairs  Loose teeth or denture: full dentures

## 2025-06-12 NOTE — H&P PST ADULT - NSICDXPASTSURGICALHX_GEN_ALL_CORE_FT
PAST SURGICAL HISTORY:  H/O tubal ligation     S/P cholecystectomy      PAST SURGICAL HISTORY:  H/O tubal ligation     H/O:      S/P cholecystectomy

## 2025-06-12 NOTE — H&P PST ADULT - NSICDXPASTMEDICALHX_GEN_ALL_CORE_FT
PAST MEDICAL HISTORY:  H/O bleeding following renal biopsy     H/O transfusion of whole blood     Hyperlipidemia     Hypertension     Proteinuria     Stage 5 chronic kidney disease on dialysis     Uses Greek as primary spoken language      PAST MEDICAL HISTORY:  CVA (cerebrovascular accident)     H/O bleeding following renal biopsy     H/O transfusion of whole blood     Hyperlipidemia     Hypertension     Proteinuria     Stage 5 chronic kidney disease on dialysis     Uses [place] as primary spoken language

## 2025-06-12 NOTE — H&P PST ADULT - NSICDXPROCEDURE_GEN_ALL_CORE_FT
PROCEDURES:  Creation of left brachiocephalic arteriovenous fistula 12-Jun-2025 17:15:28  Shira Lombardi

## 2025-06-12 NOTE — H&P PST ADULT - LAST ECHOCARDIOGRAM
2/2025 "EF 60%,  Mildly enlarged right ventricular cavity size and normal right ventricular systolic function. Dilated atria.  Mild to moderate tricuspid regurgitation"

## 2025-06-13 PROBLEM — Z12.11 ENCOUNTER FOR SCREENING FOR MALIGNANT NEOPLASM OF COLON: Chronic | Status: INACTIVE | Noted: 2024-04-10 | Resolved: 2025-06-12

## 2025-06-13 PROBLEM — N18.5 CHRONIC KIDNEY DISEASE, STAGE 5: Chronic | Status: INACTIVE | Noted: 2024-04-10 | Resolved: 2025-06-12

## 2025-06-17 ENCOUNTER — APPOINTMENT (OUTPATIENT)
Dept: CARDIOLOGY | Facility: CLINIC | Age: 60
End: 2025-06-17
Payer: COMMERCIAL

## 2025-06-17 VITALS
DIASTOLIC BLOOD PRESSURE: 78 MMHG | WEIGHT: 107 LBS | HEIGHT: 57 IN | SYSTOLIC BLOOD PRESSURE: 118 MMHG | HEART RATE: 59 BPM | BODY MASS INDEX: 23.08 KG/M2

## 2025-06-17 PROBLEM — N18.6 END STAGE RENAL DISEASE: Chronic | Status: ACTIVE | Noted: 2025-06-12

## 2025-06-17 PROBLEM — Z01.810 PREOPERATIVE CARDIOVASCULAR EXAMINATION: Status: ACTIVE | Noted: 2025-06-17

## 2025-06-17 PROBLEM — I63.9 CEREBRAL INFARCTION, UNSPECIFIED: Chronic | Status: ACTIVE | Noted: 2025-06-12

## 2025-06-17 PROCEDURE — 93000 ELECTROCARDIOGRAM COMPLETE: CPT | Mod: NC

## 2025-06-17 PROCEDURE — 99215 OFFICE O/P EST HI 40 MIN: CPT

## 2025-06-17 PROCEDURE — G2211 COMPLEX E/M VISIT ADD ON: CPT

## 2025-06-23 RX ORDER — FOLIC ACID/VIT B COMPLEX AND C 0.8 MG
0.8 TABLET ORAL
Qty: 90 | Refills: 3 | Status: ACTIVE | COMMUNITY
Start: 2025-06-23 | End: 1900-01-01

## 2025-06-25 ENCOUNTER — OUTPATIENT (OUTPATIENT)
Dept: INPATIENT UNIT | Facility: HOSPITAL | Age: 60
LOS: 1 days | End: 2025-06-25
Payer: MEDICAID

## 2025-06-25 ENCOUNTER — TRANSCRIPTION ENCOUNTER (OUTPATIENT)
Age: 60
End: 2025-06-25

## 2025-06-25 ENCOUNTER — APPOINTMENT (OUTPATIENT)
Dept: VASCULAR SURGERY | Facility: HOSPITAL | Age: 60
End: 2025-06-25
Payer: MEDICAID

## 2025-06-25 VITALS
SYSTOLIC BLOOD PRESSURE: 152 MMHG | WEIGHT: 104.94 LBS | TEMPERATURE: 98 F | DIASTOLIC BLOOD PRESSURE: 82 MMHG | OXYGEN SATURATION: 97 % | HEART RATE: 93 BPM | HEIGHT: 58.27 IN | RESPIRATION RATE: 18 BRPM

## 2025-06-25 VITALS
HEART RATE: 71 BPM | RESPIRATION RATE: 18 BRPM | SYSTOLIC BLOOD PRESSURE: 136 MMHG | DIASTOLIC BLOOD PRESSURE: 86 MMHG | OXYGEN SATURATION: 99 % | TEMPERATURE: 97 F

## 2025-06-25 DIAGNOSIS — Z98.891 HISTORY OF UTERINE SCAR FROM PREVIOUS SURGERY: Chronic | ICD-10-CM

## 2025-06-25 DIAGNOSIS — N18.4 CHRONIC KIDNEY DISEASE, STAGE 4 (SEVERE): ICD-10-CM

## 2025-06-25 DIAGNOSIS — Z90.49 ACQUIRED ABSENCE OF OTHER SPECIFIED PARTS OF DIGESTIVE TRACT: Chronic | ICD-10-CM

## 2025-06-25 DIAGNOSIS — Z98.51 TUBAL LIGATION STATUS: Chronic | ICD-10-CM

## 2025-06-25 LAB — POTASSIUM BLDV-SCNC: 4.5 MMOL/L — SIGNIFICANT CHANGE UP (ref 3.5–5.1)

## 2025-06-25 PROCEDURE — 84132 ASSAY OF SERUM POTASSIUM: CPT

## 2025-06-25 PROCEDURE — 36821 AV FUSION DIRECT ANY SITE: CPT

## 2025-06-25 PROCEDURE — C1889: CPT

## 2025-06-25 PROCEDURE — 36820 AV FUSION/FOREARM VEIN: CPT | Mod: LT

## 2025-06-25 DEVICE — ARISTA 3GR: Type: IMPLANTABLE DEVICE | Site: LEFT | Status: FUNCTIONAL

## 2025-06-25 DEVICE — CLIP APPLIER COVIDIEN SURGICLIP III 9" SM: Type: IMPLANTABLE DEVICE | Site: LEFT | Status: FUNCTIONAL

## 2025-06-25 DEVICE — SURGIFOAM 8 X 12.5CM X 10MM (100): Type: IMPLANTABLE DEVICE | Site: LEFT | Status: FUNCTIONAL

## 2025-06-25 DEVICE — CLIP APPLIER COVIDIEN SURGICLIP II 9.75" MEDIUM: Type: IMPLANTABLE DEVICE | Site: LEFT | Status: FUNCTIONAL

## 2025-06-25 RX ORDER — OXYCODONE HYDROCHLORIDE 30 MG/1
1 TABLET ORAL
Qty: 5 | Refills: 0
Start: 2025-06-25

## 2025-06-25 RX ORDER — LIDOCAINE HCL/PF 20 MG/ML
0.2 AMPUL, LUER TIP INJECTION ONCE
Refills: 0 | Status: DISCONTINUED | OUTPATIENT
Start: 2025-06-25 | End: 2025-06-25

## 2025-06-25 RX ORDER — OLMESARTAN MEDOXOMIL 5 MG/1
1 TABLET, FILM COATED ORAL
Refills: 0 | DISCHARGE

## 2025-06-25 RX ORDER — CEFAZOLIN SODIUM IN 0.9 % NACL 3 G/100 ML
2000 INTRAVENOUS SOLUTION, PIGGYBACK (ML) INTRAVENOUS ONCE
Refills: 0 | Status: COMPLETED | OUTPATIENT
Start: 2025-06-25 | End: 2025-06-25

## 2025-06-25 RX ORDER — FENTANYL CITRATE-0.9 % NACL/PF 100MCG/2ML
25 SYRINGE (ML) INTRAVENOUS
Refills: 0 | Status: DISCONTINUED | OUTPATIENT
Start: 2025-06-25 | End: 2025-06-25

## 2025-06-25 RX ORDER — SODIUM CHLORIDE 9 G/1000ML
1000 INJECTION, SOLUTION INTRAVENOUS
Refills: 0 | Status: DISCONTINUED | OUTPATIENT
Start: 2025-06-25 | End: 2025-06-25

## 2025-06-25 RX ADMIN — Medication 25 MICROGRAM(S): at 14:48

## 2025-06-25 NOTE — ASU DISCHARGE PLAN (ADULT/PEDIATRIC) - NS MD DC FALL RISK RISK
For information on Fall & Injury Prevention, visit: [email or web address] OR  [email or web address] OR  https://www.cdc.gov/steadi/patient.html

## 2025-06-25 NOTE — PATIENT PROFILE ADULT - FALL HARM RISK - UNIVERSAL INTERVENTIONS
Bed in lowest position, wheels locked, appropriate side rails in place/Call bell, personal items and telephone in reach/Instruct patient to call for assistance before getting out of bed or chair/Non-slip footwear when patient is out of bed/[place] to call system/Physically safe environment - no spills, clutter or unnecessary equipment/Purposeful Proactive Rounding/Room/bathroom lighting operational, light cord in reach

## 2025-06-25 NOTE — ASU DISCHARGE PLAN (ADULT/PEDIATRIC) - CARE PROVIDER_API CALL
Cortez Llanos  Radiology Vascular & Interventional Radiology  [address], [address]  [place] [address]  Phone: [phone]  Fax: [phone]  Established Patient  Follow Up Time:

## 2025-06-25 NOTE — ASU DISCHARGE PLAN (ADULT/PEDIATRIC) - FINANCIAL ASSISTANCE
[facility] provides services at a reduced cost to those who are determined to be eligible through [facility]’s financial assistance program. Information regarding [facility]’s financial assistance program can be found by going to [email or web address] or by calling [phone].

## 2025-07-08 ENCOUNTER — APPOINTMENT (OUTPATIENT)
Dept: VASCULAR SURGERY | Facility: CLINIC | Age: 60
End: 2025-07-08
Payer: MEDICAID

## 2025-07-08 VITALS — HEIGHT: 57 IN | WEIGHT: 107 LBS | BODY MASS INDEX: 23.08 KG/M2

## 2025-07-08 PROCEDURE — 93990 DOPPLER FLOW TESTING: CPT

## 2025-07-08 PROCEDURE — 99024 POSTOP FOLLOW-UP VISIT: CPT

## 2025-07-09 NOTE — ASU PREOP CHECKLIST - LOOSE TEETH
Botox Injection Note       Indication: patient has chronic recurrent migraine, more than 15 days/ month with duration longer than 4 hours. Tried many preventives , no avail.  Previous injections have helped her significantly.  She now rarely gets a migraine and duration is less than 2 hours.    Procedure:   Botox concentration: 200 units in 4 ml of preservative-free normal saline.   31 sites injections, distribution as follow      Units/site  Sites Sides Subtotal    Procerus 5 1 1 5    5 1 2 10   Frontalis 5 2 2 20   Temporalis 5 4 2 40   Occipitalis 5 3 2 30   Upper cervical paraspinalis 5 2 2 20   Trapezius 5 3 2 30         155 units were reconstituted and injected as above   Patient tolerated procedure well.     _____________________________   Gatito Bradshaw M.D.                     no

## 2025-08-07 ENCOUNTER — RESULT REVIEW (OUTPATIENT)
Age: 60
End: 2025-08-07

## 2025-08-07 ENCOUNTER — APPOINTMENT (OUTPATIENT)
Dept: ENDOVASCULAR SURGERY | Facility: CLINIC | Age: 60
End: 2025-08-07

## 2025-08-07 VITALS
WEIGHT: 105 LBS | SYSTOLIC BLOOD PRESSURE: 145 MMHG | RESPIRATION RATE: 18 BRPM | DIASTOLIC BLOOD PRESSURE: 78 MMHG | TEMPERATURE: 98.1 F | OXYGEN SATURATION: 98 % | HEART RATE: 84 BPM | BODY MASS INDEX: 22.65 KG/M2 | HEIGHT: 57 IN

## 2025-08-07 DIAGNOSIS — N18.6 END STAGE RENAL DISEASE: ICD-10-CM

## 2025-08-07 PROCEDURE — 36902Z: CUSTOM | Mod: 58

## 2025-08-07 PROCEDURE — 36907Z: CUSTOM | Mod: 59

## 2025-08-11 RX ORDER — HYDRALAZINE HYDROCHLORIDE 25 MG/1
25 TABLET ORAL DAILY
Qty: 90 | Refills: 3 | Status: ACTIVE | COMMUNITY
Start: 2025-08-11 | End: 1900-01-01

## 2025-08-12 ENCOUNTER — APPOINTMENT (OUTPATIENT)
Dept: NEPHROLOGY | Facility: CLINIC | Age: 60
End: 2025-08-12

## 2025-09-04 ENCOUNTER — APPOINTMENT (OUTPATIENT)
Dept: VASCULAR SURGERY | Facility: CLINIC | Age: 60
End: 2025-09-04
Payer: MEDICAID

## 2025-09-04 PROCEDURE — 99024 POSTOP FOLLOW-UP VISIT: CPT

## 2025-09-04 PROCEDURE — 93990 DOPPLER FLOW TESTING: CPT

## 2025-09-12 ENCOUNTER — APPOINTMENT (OUTPATIENT)
Dept: TRANSPLANT | Facility: HOSPITAL | Age: 60
End: 2025-09-12

## 2025-09-15 ENCOUNTER — RESULT REVIEW (OUTPATIENT)
Age: 60
End: 2025-09-15

## 2025-09-15 RX ORDER — SENNOSIDES 8.6 MG/1
8.6 TABLET ORAL
Qty: 30 | Refills: 3 | Status: ACTIVE | COMMUNITY
Start: 2025-09-15 | End: 1900-01-01

## 2025-09-15 RX ORDER — MYCOPHENOLATE MOFETIL 500 MG/1
500 TABLET ORAL
Qty: 60 | Refills: 11 | Status: ACTIVE | COMMUNITY
Start: 2025-09-15 | End: 1900-01-01

## 2025-09-15 RX ORDER — CARVEDILOL 3.12 MG/1
3.12 TABLET, FILM COATED ORAL TWICE DAILY
Qty: 60 | Refills: 3 | Status: ACTIVE | COMMUNITY
Start: 2025-09-15 | End: 1900-01-01

## 2025-09-15 RX ORDER — FAMOTIDINE 20 MG/1
20 TABLET, FILM COATED ORAL
Qty: 30 | Refills: 11 | Status: ACTIVE | COMMUNITY
Start: 2025-09-15 | End: 1900-01-01

## 2025-09-15 RX ORDER — VALGANCICLOVIR HYDROCHLORIDE 450 MG/1
450 TABLET ORAL
Qty: 30 | Refills: 5 | Status: ACTIVE | COMMUNITY
Start: 2025-09-15 | End: 1900-01-01

## 2025-09-15 RX ORDER — ASPIRIN 81 MG/1
81 TABLET, DELAYED RELEASE ORAL
Qty: 30 | Refills: 11 | Status: ACTIVE | COMMUNITY
Start: 2025-09-15 | End: 1900-01-01

## 2025-09-15 RX ORDER — SODIUM ZIRCONIUM CYCLOSILICATE 10 G/10G
10 POWDER, FOR SUSPENSION ORAL DAILY
Qty: 1 | Refills: 3 | Status: ACTIVE | COMMUNITY
Start: 2025-09-15 | End: 1900-01-01

## 2025-09-15 RX ORDER — NIFEDIPINE 60 MG/1
60 TABLET, FILM COATED, EXTENDED RELEASE ORAL
Qty: 60 | Refills: 11 | Status: ACTIVE | COMMUNITY
Start: 2025-09-15 | End: 1900-01-01

## 2025-09-15 RX ORDER — SULFAMETHOXAZOLE AND TRIMETHOPRIM 400; 80 MG/1; MG/1
400-80 TABLET ORAL DAILY
Qty: 30 | Refills: 11 | Status: ACTIVE | COMMUNITY
Start: 2025-09-15 | End: 1900-01-01

## 2025-09-15 RX ORDER — PREDNISONE 5 MG/1
5 TABLET ORAL
Qty: 34 | Refills: 0 | Status: ACTIVE | COMMUNITY
Start: 2025-09-15 | End: 1900-01-01

## 2025-09-15 RX ORDER — PREDNISONE 5 MG/1
5 TABLET ORAL
Qty: 30 | Refills: 11 | Status: ACTIVE | COMMUNITY
Start: 2025-09-15 | End: 1900-01-01

## 2025-09-15 RX ORDER — FLUCONAZOLE 100 MG/1
100 TABLET ORAL DAILY
Qty: 60 | Refills: 0 | Status: ACTIVE | COMMUNITY
Start: 2025-09-15 | End: 1900-01-01

## 2025-09-15 RX ORDER — CALCIUM ACETATE 667 MG
667 TABLET ORAL
Qty: 90 | Refills: 0 | Status: ACTIVE | COMMUNITY
Start: 2025-09-15 | End: 1900-01-01

## 2025-09-18 PROBLEM — Z94.0 KIDNEY TRANSPLANT RECIPIENT: Status: ACTIVE | Noted: 2025-09-15

## 2025-09-18 RX ORDER — PRAVASTATIN SODIUM 20 MG/1
20 TABLET ORAL DAILY
Qty: 30 | Refills: 11 | Status: ACTIVE | COMMUNITY
Start: 2025-09-18 | End: 1900-01-01

## 2025-09-25 RX ORDER — TACROLIMUS 4 MG/1
4 TABLET, EXTENDED RELEASE ORAL
Qty: 270 | Refills: 3 | Status: DISCONTINUED | COMMUNITY
Start: 2025-09-15 | End: 2025-09-25

## 2025-09-25 RX ORDER — TACROLIMUS 1 MG/1
1 TABLET, EXTENDED RELEASE ORAL
Qty: 90 | Refills: 0 | Status: ACTIVE | COMMUNITY
Start: 2025-09-15

## (undated) DEVICE — SENSOR O2 FINGER ADULT

## (undated) DEVICE — PREP CHLORAPREP HI-LITE ORANGE 26ML

## (undated) DEVICE — SUT PROLENE 6-0 4-30" BV-1

## (undated) DEVICE — TUBING SUCTION 20FT

## (undated) DEVICE — BIOPSY FORCEP RADIAL JAW 4 STANDARD WITH NEEDLE

## (undated) DEVICE — SUCTION YANKAUER NO CONTROL VENT

## (undated) DEVICE — DRAPE LIGHT HANDLE COVER (BLUE)

## (undated) DEVICE — IRRIGATOR BIO SHIELD

## (undated) DEVICE — VENODYNE/SCD SLEEVE CALF MEDIUM

## (undated) DEVICE — CLAMP BULLDOG MIDI 45 DEGREE (YELLOW) DISP

## (undated) DEVICE — SOL INJ NS 0.9% 500ML 2 PORT

## (undated) DEVICE — GLV 7.5 PROTEXIS (WHITE)

## (undated) DEVICE — SUT POLYSORB 3-0 30" V-20 UNDYED

## (undated) DEVICE — FOLEY HOLDER STATLOCK 2 WAY ADULT

## (undated) DEVICE — SYR LUER LOK 50CC

## (undated) DEVICE — TUBING IV SET GRAVITY 3Y 100" MACRO

## (undated) DEVICE — SUT SOFSILK 4-0 18" TIES

## (undated) DEVICE — CATH IV SAFE BC 22G X 1" (BLUE)

## (undated) DEVICE — CLAMP BULLDOG MIDI 45 DEGREE (GREEN) DISP

## (undated) DEVICE — DRSG TEGADERM 6 X 8"

## (undated) DEVICE — SUT SOFSILK 2-0 18" TIES

## (undated) DEVICE — BLADE SCALPEL SAFETYLOCK #11

## (undated) DEVICE — CLAMP BULLDOG MIDI STRAIGHT (YELLOW) DISP

## (undated) DEVICE — DRSG COBAN 6"

## (undated) DEVICE — VESSEL LOOP MAXI-BLUE 0.120" X 16"

## (undated) DEVICE — PACK IV START WITH CHG

## (undated) DEVICE — DRAPE TOWEL BLUE 17" X 24"

## (undated) DEVICE — DRAPE INSTRUMENT POUCH 6.75" X 11"

## (undated) DEVICE — BLADE SCALPEL SAFETYLOCK #15

## (undated) DEVICE — CLAMP BULLDOG MINI STRAIGHT (GREEN) DISP

## (undated) DEVICE — DRSG STERISTRIPS 0.5 X 4"

## (undated) DEVICE — BRUSH COLONOSCOPY CYTOLOGY

## (undated) DEVICE — FORCEP RADIAL JAW 4 JUMBO 2.8MM 3.2MM 240CM ORANGE DISP

## (undated) DEVICE — POSITIONER FOAM EGG CRATE ULNAR 2PCS (PINK)

## (undated) DEVICE — ELCTR GROUNDING PAD ADULT COVIDIEN

## (undated) DEVICE — VESSEL LOOP MINI-BLUE 0.075" X 16"

## (undated) DEVICE — CLAMP BX HOT RAD JAW 3

## (undated) DEVICE — SNARE CAPTIVATOR COLD RND STIFF 10X2.4X2.8MM 240CM

## (undated) DEVICE — CATH IV SAFE BC 20G X 1.16" (PINK)

## (undated) DEVICE — SUT SILK 3-0 18" TIES

## (undated) DEVICE — DRSG COBAN 4"

## (undated) DEVICE — MEDICATION LABELS W MARKER

## (undated) DEVICE — SOL IRR POUR H2O 250ML

## (undated) DEVICE — PACK AV FISTULA

## (undated) DEVICE — SPECIMEN CONTAINER 100ML

## (undated) DEVICE — STAPLER SKIN VISI-STAT 35 WIDE

## (undated) DEVICE — SOL IRR POUR NS 0.9% 500ML

## (undated) DEVICE — TUBING SUCTION CONN 6FT STERILE

## (undated) DEVICE — GOWN XL

## (undated) DEVICE — WARMING BLANKET LOWER ADULT

## (undated) DEVICE — SUT BIOSYN 4-0 18" P-12

## (undated) DEVICE — SOL INJ NS 0.9% 500ML 1-PORT

## (undated) DEVICE — DRSG OPSITE 13.75 X 4"

## (undated) DEVICE — POLY TRAP ETRAP